# Patient Record
Sex: MALE | Race: WHITE | NOT HISPANIC OR LATINO | Employment: OTHER | URBAN - METROPOLITAN AREA
[De-identification: names, ages, dates, MRNs, and addresses within clinical notes are randomized per-mention and may not be internally consistent; named-entity substitution may affect disease eponyms.]

---

## 2017-04-21 ENCOUNTER — TELEPHONE (OUTPATIENT)
Dept: PHARMACY | Facility: OTHER | Age: 72
End: 2017-04-21

## 2017-04-21 ENCOUNTER — OFFICE VISIT (OUTPATIENT)
Dept: CARDIOLOGY | Facility: CLINIC | Age: 72
End: 2017-04-21
Payer: COMMERCIAL

## 2017-04-21 VITALS
DIASTOLIC BLOOD PRESSURE: 78 MMHG | SYSTOLIC BLOOD PRESSURE: 132 MMHG | WEIGHT: 210 LBS | HEIGHT: 71 IN | BODY MASS INDEX: 29.4 KG/M2 | HEART RATE: 60 BPM

## 2017-04-21 DIAGNOSIS — I25.10 CORONARY ARTERY DISEASE INVOLVING NATIVE CORONARY ARTERY OF NATIVE HEART WITHOUT ANGINA PECTORIS: ICD-10-CM

## 2017-04-21 DIAGNOSIS — E78.00 HYPERCHOLESTEREMIA: Primary | ICD-10-CM

## 2017-04-21 PROCEDURE — 99214 OFFICE O/P EST MOD 30 MIN: CPT | Performed by: INTERNAL MEDICINE

## 2017-04-21 NOTE — LETTER
4/21/2017    Josef Doll MD  [facility]   [address]  [facility] [address]    RE: Michael Seals       Dear Colleague,    I had the pleasure of seeing Michael Seals in the [facility].    Micahel Seals, a 71-year-old man with coronary artery disease, dyslipidemia and hypertension was seen today at your request for a followup.      Mr. Seals underwent implantation of a 3.5 x 28 mm length everolimus stent in the proximal right coronary in 2012 for management of new onset angina and a positive stress echocardiogram.  No other significant narrowings were seen in the left coronary vessels.      In 08/2015 the patient experienced atypical left scapular pain.  Diagnostic angiography showed a normal ejection fraction without regional wall motion abnormalities.  The stented right coronary was widely patent with no restenosis and no new lesion.  The left main was normal.  The LAD had a moderate 60% narrowing, improved since the patient's last angiogram.  The circumflex had a 40%-50% mid vessel narrowing.  Medical therapy was advised.      Since I saw him one year ago, the patient remains entirely free of chest, arm, neck, jaw or back discomfort.  After very aggressive trials of statin drugs including atorvastatin, pitavastatin, pravastatin, simvastatin and low-dose rosuvastatin, the patient has been intolerant of statin drugs.  Any attempts of even low-dose statin drugs cause profound muscle aches which have resulted in stopping the drug.      He has no other medical complaints at this time.       PHYSICAL EXAMINATION:   GENERAL:  Exam today demonstrates a very pleasant, cooperative and intelligent 71-year-old man.   VITAL SIGNS:  His blood pressure is 130/78, his heart rate is 60.  His height is 1.8 meters, his weight is 90 kg, BMI is 29.8.   LUNGS:  Clear to percussion and auscultation.   CARDIOVASCULAR:  Shows a normal S1 with a normal S2, no S3.  There is no  murmur, rub or click.     Outpatient Encounter Prescriptions as of 4/21/2017   Medication Sig Dispense Refill     evolocumab (REPATHA) 140 MG/ML prefilled autoinjector Inject 1 mL (140 mg) Subcutaneous every 14 days 2 mL 11     STATIN NOT PRESCRIBED, INTENTIONAL, Please choose reason not prescribed, below       atenolol (TENORMIN) 50 MG tablet Take 0.5 tablets (25 mg) by mouth daily 90 tablet 3     RABEprazole (ACIPHEX) 20 MG tablet Take 1 tablet (20 mg) by mouth daily 90 tablet 3     omeprazole-sodium bicarbonate (ZEGERID)  MG per capsule Take 1 capsule (40 mg) by mouth daily as needed 30 capsule 3     nitroglycerin (NITROSTAT) 0.4 MG SL tablet Place 1 tablet (0.4 mg) under the tongue every 5 minutes as needed for chest pain 90 tablet 3     Multiple Vitamins-Minerals (MULTIVITAMIN PO) Take by mouth daily       aspirin (SB LOW DOSE ASA EC) 81 MG EC tablet Take 81 mg by mouth daily.       [DISCONTINUED] ezetimibe (ZETIA) 10 MG tablet Take 1 tablet (10 mg) by mouth daily 90 tablet 3     [DISCONTINUED] pitavastatin (LIVALO) 4 MG TABS tablet Take 4 mg by mouth daily 90 tablet 3     [DISCONTINUED] rosuvastatin (CRESTOR) 5 MG tablet Once weekly 60 tablet 0     [DISCONTINUED] Omega-3 Fatty Acids (OMEGA-3 FISH OIL PO) Take by mouth daily       ACE NOT PRESCRIBED, INTENTIONAL, continuous prn Reported on 4/21/2017       No facility-administered encounter medications on file as of 4/21/2017.       ASSESSMENT:  Mr. Seals is on good medical therapy and is free of cardiovascular symptoms with optimal blood pressure but an elevated LDL cholesterol level.  His last LDL cholesterol was 187 off of treatment.      I believe at this point he is a candidate for a PCSK9 inhibitor.  After discussion with the patient today he agreed to consider Repatha.      RECOMMENDATIONS:   1.  Continue present medical therapy including his aspirin and beta blocker.   2.  Regular exercise program with a Mediterranean-style diet.   3.  Referral  for PCSK9 inhibitor.      We greatly appreciate the opportunity to see Mr. Michael Seals.  I will plan a followup visit in one year and our team will continue to follow his LDL cholesterol levels while on Repatha.                 We greatly appreciate your patient, Mr. Michael Seals.     Sincerely,    Del Betancourt MD     [facility]

## 2017-04-21 NOTE — MR AVS SNAPSHOT
"              After Visit Summary   2017    Michael Seals    MRN: [id number]           Patient Information     Date Of Birth          1945        Visit Information        Provider Department      2017 9:15 AM Del Betancourt MD [facility] [place]        Today's Diagnoses     Hypercholesteremia    -  1    Coronary artery disease involving native coronary artery of native heart without angina pectoris           Follow-ups after your visit        Who to contact     If you have questions or need follow up information about today's clinic visit or your schedule please contact [facility] directly at [phone].  Normal or non-critical lab and imaging results will be communicated to you by [employer], letter or phone within 4 business days after the clinic has received the results. If you do not hear from us within 7 days, please contact the clinic through [employer] or phone. If you have a critical or abnormal lab result, we will notify you by phone as soon as possible.  Submit refill requests through [employer] or call your pharmacy and they will forward the refill request to us. Please allow 3 business days for your refill to be completed.          Additional Information About Your Visit        MyChart Information     [employer] lets you send messages to your doctor, view your test results, renew your prescriptions, schedule appointments and more. To sign up, go to [email or web address] . Click on \"Log in\" on the left side of the screen, which will take you to the Welcome page. Then click on \"Sign up Now\" on the right side of the page.     You will be asked to enter the access code listed below, as well as some personal information. Please follow the directions to create your username and password.     Your access code is: [email or web address]  Expires: 2017  9:46 AM     Your access code will  in 90 days. If you need help or a new " "code, please call your [facility] clinic or [phone].        Care EveryWhere ID     This is your Care EveryWhere ID. This could be used by other organizations to access your [facility] medical records  [id number]        Your Vitals Were     Pulse Height BMI (Body Mass Index)             60 1.791 m (5' 10.5\") 29.71 kg/m2          Blood Pressure from Last 3 Encounters:   04/21/17 132/78   07/29/16 126/86   11/25/15 108/72    Weight from Last 3 Encounters:   04/21/17 95.3 kg (210 lb)   07/29/16 96.6 kg (213 lb)   11/25/15 96.2 kg (212 lb)              Today, you had the following     No orders found for display         Today's Medication Changes          These changes are accurate as of: 4/21/17  9:46 AM.  If you have any questions, ask your nurse or doctor.               Start taking these medicines.        Dose/Directions    evolocumab 140 MG/ML prefilled autoinjector   Commonly known as:  REPATHA   Used for:  Hypercholesteremia   Started by:  Del Betancourt MD        Dose:  140 mg   Inject 1 mL (140 mg) Subcutaneous every 14 days   Quantity:  2 mL   Refills:  11            Where to get your medicines      These medications were sent to [facility] - [place], [place] - [address]   [address] , [facility] [address]    Hours:  Mon-Fri 8:30am-5:00pm Toll Free [phone] Phone:  [phone]     evolocumab 140 MG/ML prefilled autoinjector                Primary Care Provider Office Phone # Fax #    Josef Doll [phone]       [facility] [address]  [facility] [address]        Thank you!     Thank you for choosing [facility] [place]  for your care. Our goal is always to provide you with excellent care. Hearing back from our patients is one way we can continue to improve our services. Please take a few minutes to complete the written survey that you may receive in the mail after your " visit with us. Thank you!             Your Updated Medication List - Protect others around you: Learn how to safely use, store and throw away your medicines at www.disposemymeds.org.          This list is accurate as of: 4/21/17  9:46 AM.  Always use your most recent med list.                   Brand Name Dispense Instructions for use    ACE NOT PRESCRIBED (INTENTIONAL)      continuous prn Reported on 4/21/2017       atenolol 50 MG tablet    TENORMIN    90 tablet    Take 0.5 tablets (25 mg) by mouth daily       evolocumab 140 MG/ML prefilled autoinjector    REPATHA    2 mL    Inject 1 mL (140 mg) Subcutaneous every 14 days       MULTIVITAMIN PO      Take by mouth daily       nitroglycerin 0.4 MG sublingual tablet    NITROSTAT    90 tablet    Place 1 tablet (0.4 mg) under the tongue every 5 minutes as needed for chest pain       omeprazole-sodium bicarbonate  MG per capsule    ZEGERID    30 capsule    Take 1 capsule (40 mg) by mouth daily as needed       RABEprazole 20 MG EC tablet    ACIPHEX    90 tablet    Take 1 tablet (20 mg) by mouth daily       SB LOW DOSE ASA EC 81 MG EC tablet   Generic drug:  aspirin      Take 81 mg by mouth daily.       STATIN NOT PRESCRIBED (INTENTIONAL)      Please choose reason not prescribed, below

## 2017-04-21 NOTE — TELEPHONE ENCOUNTER
PA Initiation    Medication: Repatha  Insurance Company: [employer] - Phone [phone] Fax [phone]  Pharmacy Filling the Rx: [facility] - [place], [place] - [facility] [address]  Filling Pharmacy Phone: [phone]  Filling Pharmacy Fax: [phone]  Start Date: 4/21/2017

## 2017-04-21 NOTE — PROGRESS NOTES
HPI and Plan:   See dictation    No orders of the defined types were placed in this encounter.      Orders Placed This Encounter   Medications     evolocumab (REPATHA) 140 MG/ML prefilled autoinjector     Sig: Inject 1 mL (140 mg) Subcutaneous every 14 days     Dispense:  2 mL     Refill:  11       Medications Discontinued During This Encounter   Medication Reason     pitavastatin (LIVALO) 4 MG TABS tablet Stopped by Patient     ezetimibe (ZETIA) 10 MG tablet Stopped by Patient     Omega-3 Fatty Acids (OMEGA-3 FISH OIL PO) Stopped by Patient     rosuvastatin (CRESTOR) 5 MG tablet Stopped by Patient         Encounter Diagnoses   Name Primary?     Hypercholesteremia Yes     Coronary artery disease involving native coronary artery of native heart without angina pectoris        CURRENT MEDICATIONS:  Current Outpatient Prescriptions   Medication Sig Dispense Refill     evolocumab (REPATHA) 140 MG/ML prefilled autoinjector Inject 1 mL (140 mg) Subcutaneous every 14 days 2 mL 11     atenolol (TENORMIN) 50 MG tablet Take 0.5 tablets (25 mg) by mouth daily 90 tablet 3     RABEprazole (ACIPHEX) 20 MG tablet Take 1 tablet (20 mg) by mouth daily 90 tablet 3     omeprazole-sodium bicarbonate (ZEGERID)  MG per capsule Take 1 capsule (40 mg) by mouth daily as needed 30 capsule 3     nitroglycerin (NITROSTAT) 0.4 MG SL tablet Place 1 tablet (0.4 mg) under the tongue every 5 minutes as needed for chest pain 90 tablet 3     Multiple Vitamins-Minerals (MULTIVITAMIN PO) Take by mouth daily       aspirin (SB LOW DOSE ASA EC) 81 MG EC tablet Take 81 mg by mouth daily.       ACE NOT PRESCRIBED, INTENTIONAL, continuous prn Reported on 4/21/2017         ALLERGIES     Allergies   Allergen Reactions     Atorvastatin      Crestor [Rosuvastatin]      Muscle aches     Livalo [Pitavastatin]      Muscle aches     Pravastatin      Simvastatin        PAST MEDICAL HISTORY:  Past Medical History:   Diagnosis Date     CAD (coronary artery  disease)     3/2012 LILLIAN RCA, 2015 Heart cath - previously placed RCA stent patent, 70% mid LAD stenosis, 50% prox CFX, EF 55-60%     CKD (chronic kidney disease) stage 2, GFR 60-89 ml/min     baseline crt 1.20 - 1.60     Esophageal dysmotility      GERD (gastroesophageal reflux disease)      H pylori ulcer      Hip fracture (H) 2013    right side - closed fracture - no surgery     HTN (hypertension)      Hyperlipidaemia LDL goal <100      Moderate major depression (H)      S/P appendectomy      S/P hernia repair     left     S/P PTCA (percutaneous transluminal coronary angioplasty) 2012    LILLIAN RCA       PAST SURGICAL HISTORY:  Past Surgical History:   Procedure Laterality Date     APPENDECTOMY       CORONARY ANGIOGRAPHY ADULT ORDER  2015    previously placed RCA stent patent, 70% mid LAD stenosis, 50% prox CFX, EF 55-60%     HEART CATH STENT COR W/WO PTCA  3/2012    LILLIAN to RCA      HERNIA REPAIR         FAMILY HISTORY:  Family History   Problem Relation Age of Onset     CEREBROVASCULAR DISEASE Mother      CEREBROVASCULAR DISEASE Father      C.A.D. Father      Myocardial Infarction Father      Parkinsonism Brother      Heart Failure Brother      Other - See Comments Brother       in [place] war       SOCIAL HISTORY:  Social History     Social History     Marital status:      Spouse name: N/A     Number of children: N/A     Years of education: N/A     Social History Main Topics     Smoking status: Former Smoker     Packs/day: 1.00     Years: 8.00     Types: Cigarettes     Smokeless tobacco: Never Used      Comment: quit 1970     Alcohol use 6.0 oz/week     10 Standard drinks or equivalent per week      Comment: 1-2 glasses of wine per evening with dinner     Drug use: No     Sexual activity: Yes     Partners: Female     Other Topics Concern     Caffeine Concern Yes     3-4 cups coffee per day     Sleep Concern Yes     off and on     Weight Concern Yes     Special Diet No     Exercise Yes      "walking 5 days week, 2.5 miles, biking occasionally     Social History Narrative       Review of Systems:  Skin:  Negative       Eyes:  Positive for glasses;glaucoma    ENT:  Positive for hearing loss wears hearing aids  Respiratory:  Negative       Cardiovascular:    Positive for;lightheadedness when getting up too quickly in AM  Gastroenterology: Positive for reflux once in a while - food and stress related - per pt  Genitourinary:  Negative      Musculoskeletal:  Positive for arthritis left hand and elbow  Neurologic:  Negative      Psychiatric:  Negative      Heme/Lymph/Imm:  Positive for allergies Statins  Endocrine:  Negative        Physical Exam:  Vitals: /78 (BP Location: Right arm, Patient Position: Chair, Cuff Size: Adult Large)  Pulse 60  Ht 1.791 m (5' 10.5\")  Wt 95.3 kg (210 lb)  BMI 29.71 kg/m2    Constitutional:  cooperative, alert and oriented, well developed, well nourished, in no acute distress        Skin:  warm and dry to the touch, no apparent skin lesions or masses noted        Head:  normocephalic, no masses or lesions        Eyes:  pupils equal and round, conjunctivae and lids unremarkable, sclera white, no xanthalasma, EOMS intact, no nystagmus        ENT:  no pallor or cyanosis, dentition good        Neck:  carotid pulses are full and equal bilaterally, JVP normal, no carotid bruit, no thyromegaly        Chest:  normal breath sounds, clear to auscultation, normal A-P diameter, normal symmetry, normal respiratory excursion, no use of accessory muscles          Cardiac: regular rhythm, normal S1/S2, no S3 or S4, apical impulse not displaced, no murmurs, gallops or rubs                  Abdomen:  abdomen soft, non-tender, BS normoactive, no mass, no HSM, no bruits        Vascular: pulses full and equal, no bruits auscultated                                        Extremities and Back:  no deformities, clubbing, cyanosis, erythema observed              Neurological:  affect " appropriate, oriented to time, person and place              CC  No referring provider defined for this encounter.

## 2017-04-22 NOTE — PROGRESS NOTES
HISTORY OF PRESENT ILLNESS:  Michael Seals, a 71-year-old man with coronary artery disease, dyslipidemia and hypertension was seen today at your request for a followup.      Mr. Seals underwent implantation of a 3.5 x 28 mm length everolimus stent in the proximal right coronary in 2012 for management of new onset angina and a positive stress echocardiogram.  No other significant narrowings were seen in the left coronary vessels.      In 08/2015 the patient experienced atypical left scapular pain.  Diagnostic angiography showed a normal ejection fraction without regional wall motion abnormalities.  The stented right coronary was widely patent with no restenosis and no new lesion.  The left main was normal.  The LAD had a moderate 60% narrowing, improved since the patient's last angiogram.  The circumflex had a 40%-50% mid vessel narrowing.  Medical therapy was advised.      Since I saw him one year ago, the patient remains entirely free of chest, arm, neck, jaw or back discomfort.  After very aggressive trials of statin drugs including atorvastatin, pitavastatin, pravastatin, simvastatin and low-dose rosuvastatin, the patient has been intolerant of statin drugs.  Any attempts of even low-dose statin drugs cause profound muscle aches which have resulted in stopping the drug.      He has no other medical complaints at this time.       PHYSICAL EXAMINATION:   GENERAL:  Exam today demonstrates a very pleasant, cooperative and intelligent 71-year-old man.   VITAL SIGNS:  His blood pressure is 130/78, his heart rate is 60.  His height is 1.8 meters, his weight is 90 kg, BMI is 29.8.   LUNGS:  Clear to percussion and auscultation.   CARDIOVASCULAR:  Shows a normal S1 with a normal S2, no S3.  There is no murmur, rub or click.      ASSESSMENT:  Mr. Seals is on good medical therapy and is free of cardiovascular symptoms with optimal blood pressure but an elevated LDL cholesterol level.  His last LDL cholesterol was 187 off of  treatment.      I believe at this point he is a candidate for a PCSK9 inhibitor.  After discussion with the patient today he agreed to consider Repatha.      RECOMMENDATIONS:   1.  Continue present medical therapy including his aspirin and beta blocker.   2.  Regular exercise program with a Mediterranean-style diet.   3.  Referral for PCSK9 inhibitor.      We greatly appreciate the opportunity to see Mr. Niurka Seals.  I will plan a followup visit in one year and our team will continue to follow his LDL cholesterol levels while on Repatha.       We greatly appreciate your patient, Mr. Niurka Seals.      cc:   Josef Doll MD    [facility]   [address]   [place]  [address]         NARINDER SEALS MD             D: 2017 09:53   T: 2017 20:50   MT: KARIN      Name:     NIURKA SEALS   MRN:      [id number]        Account:      [id number]   :      1945           Service Date: 2017      Document: [id number]

## 2017-04-26 NOTE — TELEPHONE ENCOUNTER
PRIOR AUTHORIZATION DENIED    Medication: Repatha    Denial Date: 4/26/2017    Denial Rational:     Appeal Information:  [facility] SPECIALTY APPEALS DEPARTMENT:  [address] [place] [address]  FAX# [phone]

## 2017-07-27 ENCOUNTER — TELEPHONE (OUTPATIENT)
Dept: FAMILY MEDICINE | Facility: CLINIC | Age: 72
End: 2017-07-27

## 2017-08-04 DIAGNOSIS — K21.00 GASTROESOPHAGEAL REFLUX DISEASE WITH ESOPHAGITIS: ICD-10-CM

## 2017-08-04 NOTE — TELEPHONE ENCOUNTER
TC: please call patient and schedule. Over due for physical with labs. Then send to Dr Doll to sign.  Mail order pharmacy, 90 days pended.    Pending Prescriptions:                       Disp   Refills    RABEprazole (ACIPHEX) 20 MG EC tablet     90 tab*0            Sig: Take 1 tablet (20 mg) by mouth daily - Physical           with labs needed for additional refills          Last Written Prescription Date: 7-  Last Fill Quantity: 90,  # refills: 3   Last Office Visit with [facility] or [facility] prescribing provider: 7- RT Rimma (R)

## 2017-08-07 RX ORDER — RABEPRAZOLE SODIUM 20 MG/1
20 TABLET, DELAYED RELEASE ORAL DAILY
Qty: 90 TABLET | Refills: 0 | Status: SHIPPED | OUTPATIENT
Start: 2017-08-07 | End: 2017-08-18

## 2017-08-18 ENCOUNTER — OFFICE VISIT (OUTPATIENT)
Dept: FAMILY MEDICINE | Facility: CLINIC | Age: 72
End: 2017-08-18
Payer: COMMERCIAL

## 2017-08-18 VITALS
DIASTOLIC BLOOD PRESSURE: 80 MMHG | HEIGHT: 71 IN | OXYGEN SATURATION: 97 % | WEIGHT: 206 LBS | BODY MASS INDEX: 28.84 KG/M2 | HEART RATE: 49 BPM | SYSTOLIC BLOOD PRESSURE: 134 MMHG

## 2017-08-18 DIAGNOSIS — K21.00 GASTROESOPHAGEAL REFLUX DISEASE WITH ESOPHAGITIS: ICD-10-CM

## 2017-08-18 DIAGNOSIS — E78.00 HYPERCHOLESTEREMIA: ICD-10-CM

## 2017-08-18 DIAGNOSIS — Z00.00 ROUTINE GENERAL MEDICAL EXAMINATION AT A HEALTH CARE FACILITY: Primary | ICD-10-CM

## 2017-08-18 DIAGNOSIS — R94.39 ABNORMAL STRESS TEST: ICD-10-CM

## 2017-08-18 DIAGNOSIS — Z98.61 S/P PTCA (PERCUTANEOUS TRANSLUMINAL CORONARY ANGIOPLASTY): ICD-10-CM

## 2017-08-18 DIAGNOSIS — K21.9 GASTROESOPHAGEAL REFLUX DISEASE WITHOUT ESOPHAGITIS: ICD-10-CM

## 2017-08-18 DIAGNOSIS — M65.30 TRIGGER FINGER, ACQUIRED: ICD-10-CM

## 2017-08-18 DIAGNOSIS — I25.10 ASCVD (ARTERIOSCLEROTIC CARDIOVASCULAR DISEASE): ICD-10-CM

## 2017-08-18 DIAGNOSIS — E78.5 HYPERLIPIDEMIA WITH TARGET LDL LESS THAN 70: ICD-10-CM

## 2017-08-18 DIAGNOSIS — I10 ESSENTIAL HYPERTENSION: ICD-10-CM

## 2017-08-18 DIAGNOSIS — N18.2 CKD (CHRONIC KIDNEY DISEASE) STAGE 2, GFR 60-89 ML/MIN: ICD-10-CM

## 2017-08-18 LAB
ALBUMIN SERPL-MCNC: 3.5 G/DL (ref 3.4–5)
ALP SERPL-CCNC: 84 U/L (ref 40–150)
ALT SERPL W P-5'-P-CCNC: 36 U/L (ref 0–70)
ANION GAP SERPL CALCULATED.3IONS-SCNC: 5 MMOL/L (ref 3–14)
AST SERPL W P-5'-P-CCNC: 23 U/L (ref 0–45)
BILIRUB SERPL-MCNC: 0.4 MG/DL (ref 0.2–1.3)
BUN SERPL-MCNC: 20 MG/DL (ref 7–30)
CALCIUM SERPL-MCNC: 9.1 MG/DL (ref 8.5–10.1)
CHLORIDE SERPL-SCNC: 106 MMOL/L (ref 94–109)
CHOLEST SERPL-MCNC: 341 MG/DL
CO2 SERPL-SCNC: 28 MMOL/L (ref 20–32)
CREAT SERPL-MCNC: 1.35 MG/DL (ref 0.66–1.25)
ERYTHROCYTE [DISTWIDTH] IN BLOOD BY AUTOMATED COUNT: 12.5 % (ref 10–15)
GFR SERPL CREATININE-BSD FRML MDRD: 52 ML/MIN/1.7M2
GLUCOSE SERPL-MCNC: 109 MG/DL (ref 70–99)
HBA1C MFR BLD: 5.2 % (ref 4.3–6)
HCT VFR BLD AUTO: 42.8 % (ref 40–53)
HDLC SERPL-MCNC: 53 MG/DL
HGB BLD-MCNC: 14.7 G/DL (ref 13.3–17.7)
LDLC SERPL CALC-MCNC: 232 MG/DL
MCH RBC QN AUTO: 32.9 PG (ref 26.5–33)
MCHC RBC AUTO-ENTMCNC: 34.3 G/DL (ref 31.5–36.5)
MCV RBC AUTO: 96 FL (ref 78–100)
NONHDLC SERPL-MCNC: 288 MG/DL
PLATELET # BLD AUTO: 223 10E9/L (ref 150–450)
POTASSIUM SERPL-SCNC: 4.8 MMOL/L (ref 3.4–5.3)
PROT SERPL-MCNC: 7.5 G/DL (ref 6.8–8.8)
PSA SERPL-ACNC: 1.29 UG/L (ref 0–4)
RBC # BLD AUTO: 4.47 10E12/L (ref 4.4–5.9)
SODIUM SERPL-SCNC: 139 MMOL/L (ref 133–144)
TRIGL SERPL-MCNC: 281 MG/DL
WBC # BLD AUTO: 5.8 10E9/L (ref 4–11)

## 2017-08-18 PROCEDURE — G0103 PSA SCREENING: HCPCS | Performed by: INTERNAL MEDICINE

## 2017-08-18 PROCEDURE — 36415 COLL VENOUS BLD VENIPUNCTURE: CPT | Performed by: INTERNAL MEDICINE

## 2017-08-18 PROCEDURE — 85027 COMPLETE CBC AUTOMATED: CPT | Performed by: INTERNAL MEDICINE

## 2017-08-18 PROCEDURE — 80061 LIPID PANEL: CPT | Performed by: INTERNAL MEDICINE

## 2017-08-18 PROCEDURE — 83036 HEMOGLOBIN GLYCOSYLATED A1C: CPT | Performed by: INTERNAL MEDICINE

## 2017-08-18 PROCEDURE — 80053 COMPREHEN METABOLIC PANEL: CPT | Performed by: INTERNAL MEDICINE

## 2017-08-18 PROCEDURE — G0439 PPPS, SUBSEQ VISIT: HCPCS | Performed by: INTERNAL MEDICINE

## 2017-08-18 RX ORDER — ATENOLOL 50 MG/1
25 TABLET ORAL DAILY
Qty: 90 TABLET | Refills: 3 | Status: SHIPPED | OUTPATIENT
Start: 2017-08-18 | End: 2018-02-28

## 2017-08-18 RX ORDER — RABEPRAZOLE SODIUM 20 MG/1
20 TABLET, DELAYED RELEASE ORAL DAILY
Qty: 90 TABLET | Refills: 3 | Status: SHIPPED | OUTPATIENT
Start: 2017-08-18 | End: 2018-02-07

## 2017-08-18 NOTE — MR AVS SNAPSHOT
After Visit Summary   8/18/2017    Michael Seals    MRN: [id number]           Patient Information     Date Of Birth          1945        Visit Information        Provider Department      8/18/2017 9:30 AM Josef Doll MD [facility]        Today's Diagnoses     Routine general medical examination at a health care facility    -  1    S/P PTCA (percutaneous transluminal coronary angioplasty)        CKD (chronic kidney disease) stage 2, GFR 60-89 ml/min        Hyperlipidemia with target LDL less than 70        Essential hypertension        Abnormal stress test        Gastroesophageal reflux disease without esophagitis        Hypercholesteremia        Gastroesophageal reflux disease with esophagitis        ASCVD (arteriosclerotic cardiovascular disease)          Care Instructions      Preventive Health Recommendations:       Male Ages 65 and over    Yearly exam:             See your health care provider every year in order to  o   Review health changes.   o   Discuss preventive care.    o   Review your medicines if your doctor has prescribed any.    Talk with your health care provider about whether you should have a test to screen for prostate cancer (PSA).    Every 3 years, have a diabetes test (fasting glucose). If you are at risk for diabetes, you should have this test more often.    Every 5 years, have a cholesterol test. Have this test more often if you are at risk for high cholesterol or heart disease.     Every 10 years, have a colonoscopy. Or, have a yearly FIT test (stool test). These exams will check for colon cancer.    Talk to with your health care provider about screening for Abdominal Aortic Aneurysm if you have a family history of AAA or have a history of smoking.  Shots:     Get a flu shot each year.     Get a tetanus shot every 10 years.     Talk to your doctor about your pneumonia vaccines. There are now two you should receive - Pneumovax (PPSV 23) and Prevnar  (PCV 13).    Talk to your doctor about a shingles vaccine.     Talk to your doctor about the hepatitis B vaccine.  Nutrition:     Eat at least 5 servings of fruits and vegetables each day.     Eat whole-grain bread, whole-wheat pasta and brown rice instead of white grains and rice.     Talk to your doctor about Calcium and Vitamin D.   Lifestyle    Exercise for at least 150 minutes a week (30 minutes a day, 5 days a week). This will help you control your weight and prevent disease.     Limit alcohol to one drink per day.     No smoking.     Wear sunscreen to prevent skin cancer.     See your dentist every six months for an exam and cleaning.     See your eye doctor every 1 to 2 years to screen for conditions such as glaucoma, macular degeneration and cataracts.    (Z00.00) Routine general medical examination at a health care facility  (primary encounter diagnosis)  Comment: For routine exam, we will draw labs as ordered, cholesterol, diabetes mellitus check, liver function, renal function, PSA and request FIT tset - A negative FIT test indicates no evidence of colon cancer, but it should be repeated every year to have comparable sensitivity to that of a colonoscopy.   We will also update vaccination history  Plan: Fecal colorectal cancer screen (FIT),         Hemoglobin A1c, CBC with platelets, Lipid panel        reflex to direct LDL, Comprehensive metabolic         panel, Prostate spec antigen screen            (Z98.61) S/P PTCA (percutaneous transluminal coronary angioplasty)  Comment: Recommend starting Repatha as you have not been able to tolerate any statins.  Also, continue aspirin, ad blood pressure management   Plan:     (N18.2) CKD (chronic kidney disease) stage 2, GFR 60-89 ml/min  Comment: check renal function today  Plan:     (E78.5) Hyperlipidemia with target LDL less than 70  Comment: As above - Repatha ordered  Plan:     (I10) Essential hypertension  Comment: blood pressure is well controlled   Plan:  "    (K21.9) Gastroesophageal reflux disease without esophagitis  Comment: Aciphex ordered today  Plan:     (E78.00) Hypercholesteremia  Comment: as above   Plan: evolocumab (REPATHA) 140 MG/ML prefilled         autoinjector                      Follow-ups after your visit        Future tests that were ordered for you today     Open Future Orders        Priority Expected Expires Ordered    Fecal colorectal cancer screen (FIT) Routine 2017 11/10/2017 2017            Who to contact     If you have questions or need follow up information about today's clinic visit or your schedule please contact [facility] directly at [phone].  Normal or non-critical lab and imaging results will be communicated to you by [employer], letter or phone within 4 business days after the clinic has received the results. If you do not hear from us within 7 days, please contact the clinic through [employer] or phone. If you have a critical or abnormal lab result, we will notify you by phone as soon as possible.  Submit refill requests through [employer] or call your pharmacy and they will forward the refill request to us. Please allow 3 business days for your refill to be completed.          Additional Information About Your Visit        [employer] Information     [employer] lets you send messages to your doctor, view your test results, renew your prescriptions, schedule appointments and more. To sign up, go to [email or web address] . Click on \"Log in\" on the left side of the screen, which will take you to the Welcome page. Then click on \"Sign up Now\" on the right side of the page.     You will be asked to enter the access code listed below, as well as some personal information. Please follow the directions to create your username and password.     Your access code is: [email or web address]  Expires: 2017  9:56 AM     Your access code will  in 90 days. If you need help or a new code, please call your [facility] or " "[phone].        Care EveryWhere ID     This is your Care EveryWhere ID. This could be used by other organizations to access your [place] medical records  [id number]        Your Vitals Were     Pulse Height Pulse Oximetry BMI (Body Mass Index)          53 5' 10.5\" (1.791 m) 97% 29.14 kg/m2         Blood Pressure from Last 3 Encounters:   08/18/17 141/89   04/21/17 132/78   07/29/16 126/86    Weight from Last 3 Encounters:   08/18/17 206 lb (93.4 kg)   04/21/17 210 lb (95.3 kg)   07/29/16 213 lb (96.6 kg)              We Performed the Following     CBC with platelets     Comprehensive metabolic panel     Hemoglobin A1c     Lipid panel reflex to direct LDL     Prostate spec antigen screen          Where to get your medicines      These medications were sent to [facility] - [place], [place] - [address] to [address], [facility] [address]     Phone:  [phone]     atenolol 50 MG tablet    evolocumab 140 MG/ML prefilled autoinjector    RABEprazole 20 MG EC tablet          Primary Care Provider Office Phone # Fax #    Josef Doll [phone] [address] [address]        Equal Access to Services     ABUNDIO BRANDON AH: Hadii jarrod ku hadasho Sokemal, waaxda luqadaha, qaybta kaalmada arden luque . So [facility] [phone].    ATENCIÓN: Si habla español, tiene a banda disposición servicios gratuitos de asistencia lingüística. Darlin al [phone].    We comply with applicable federal civil rights laws and [place] laws. We do not discriminate on the basis of race, color, national origin, age, disability sex, sexual orientation or gender identity.            Thank you!     Thank you for choosing [facility]  for your care. Our goal is always to provide you with excellent care. Hearing back from our patients is one way we can continue to improve our " services. Please take a few minutes to complete the written survey that you may receive in the mail after your visit with us. Thank you!             Your Updated Medication List - Protect others around you: Learn how to safely use, store and throw away your medicines at www.disposemymeds.org.          This list is accurate as of: 8/18/17  9:56 AM.  Always use your most recent med list.                   Brand Name Dispense Instructions for use Diagnosis    ACE NOT PRESCRIBED (INTENTIONAL)      continuous prn Reported on 4/21/2017        atenolol 50 MG tablet    TENORMIN    90 tablet    Take 0.5 tablets (25 mg) by mouth daily    ASCVD (arteriosclerotic cardiovascular disease)       evolocumab 140 MG/ML prefilled autoinjector    REPATHA    2 mL    Inject 1 mL (140 mg) Subcutaneous every 14 days    Hypercholesteremia       MULTIVITAMIN PO      Take by mouth daily        nitroGLYcerin 0.4 MG sublingual tablet    NITROSTAT    90 tablet    Place 1 tablet (0.4 mg) under the tongue every 5 minutes as needed for chest pain    ASCVD (arteriosclerotic cardiovascular disease)       omeprazole-sodium bicarbonate  MG per capsule    ZEGERID    30 capsule    Take 1 capsule (40 mg) by mouth daily as needed    Esophageal dysmotility       RABEprazole 20 MG EC tablet    ACIPHEX    90 tablet    Take 1 tablet (20 mg) by mouth daily - Physical with labs needed for additional refills    Gastroesophageal reflux disease with esophagitis       SB LOW DOSE ASA EC 81 MG EC tablet   Generic drug:  aspirin      Take 81 mg by mouth daily.        STATIN NOT PRESCRIBED (INTENTIONAL)      Please choose reason not prescribed, below    Hypercholesteremia, Coronary artery disease involving native coronary artery of native heart without angina pectoris

## 2017-08-18 NOTE — PATIENT INSTRUCTIONS
Preventive Health Recommendations:       Male Ages 65 and over    Yearly exam:             See your health care provider every year in order to  o   Review health changes.   o   Discuss preventive care.    o   Review your medicines if your doctor has prescribed any.    Talk with your health care provider about whether you should have a test to screen for prostate cancer (PSA).    Every 3 years, have a diabetes test (fasting glucose). If you are at risk for diabetes, you should have this test more often.    Every 5 years, have a cholesterol test. Have this test more often if you are at risk for high cholesterol or heart disease.     Every 10 years, have a colonoscopy. Or, have a yearly FIT test (stool test). These exams will check for colon cancer.    Talk to with your health care provider about screening for Abdominal Aortic Aneurysm if you have a family history of AAA or have a history of smoking.  Shots:     Get a flu shot each year.     Get a tetanus shot every 10 years.     Talk to your doctor about your pneumonia vaccines. There are now two you should receive - Pneumovax (PPSV 23) and Prevnar (PCV 13).    Talk to your doctor about a shingles vaccine.     Talk to your doctor about the hepatitis B vaccine.  Nutrition:     Eat at least 5 servings of fruits and vegetables each day.     Eat whole-grain bread, whole-wheat pasta and brown rice instead of white grains and rice.     Talk to your doctor about Calcium and Vitamin D.   Lifestyle    Exercise for at least 150 minutes a week (30 minutes a day, 5 days a week). This will help you control your weight and prevent disease.     Limit alcohol to one drink per day.     No smoking.     Wear sunscreen to prevent skin cancer.     See your dentist every six months for an exam and cleaning.     See your eye doctor every 1 to 2 years to screen for conditions such as glaucoma, macular degeneration and cataracts.    (Z00.00) Routine general medical examination at a health  care facility  (primary encounter diagnosis)  Comment: For routine exam, we will draw labs as ordered, cholesterol, diabetes mellitus check, liver function, renal function, PSA and request FIT tset - A negative FIT test indicates no evidence of colon cancer, but it should be repeated every year to have comparable sensitivity to that of a colonoscopy.   We will also update vaccination history  Plan: Fecal colorectal cancer screen (FIT),         Hemoglobin A1c, CBC with platelets, Lipid panel        reflex to direct LDL, Comprehensive metabolic         panel, Prostate spec antigen screen            (Z98.61) S/P PTCA (percutaneous transluminal coronary angioplasty)  Comment: Recommend starting Repatha as you have not been able to tolerate any statins.  Also, continue aspirin, ad blood pressure management   Plan:     (N18.2) CKD (chronic kidney disease) stage 2, GFR 60-89 ml/min  Comment: check renal function today  Plan:     (E78.5) Hyperlipidemia with target LDL less than 70  Comment: As above - Repatha ordered  Plan:     (I10) Essential hypertension  Comment: blood pressure is well controlled   Plan:     (K21.9) Gastroesophageal reflux disease without esophagitis  Comment: Aciphex ordered today  Plan:     (E78.00) Hypercholesteremia  Comment: as above   Plan: evolocumab (REPATHA) 140 MG/ML prefilled         autoinjector

## 2017-08-18 NOTE — NURSING NOTE
"Chief Complaint   Patient presents with     Wellness Visit       Initial /89  Pulse 53  Ht 5' 10.5\" (1.791 m)  Wt 206 lb (93.4 kg)  SpO2 97%  BMI 29.14 kg/m2 Estimated body mass index is 29.14 kg/(m^2) as calculated from the following:    Height as of this encounter: 5' 10.5\" (1.791 m).    Weight as of this encounter: 206 lb (93.4 kg).  Medication Reconciliation: complete     Margaret Santana      "

## 2017-08-18 NOTE — LETTER
[address]  [address]  [place]  [address]  Tel: [phone]    August 21, 2017    Michael Seals  [address]  [place] [address]        Dear Mr. Seals,    I had the opportunity to review your recent labs and a summary of your labs reads as follows:     Your complete blood counts show no sign of anemia, normal white blood cell count and platelets.   Your comprehensive metabolic panel showed stable and impaired renal function, normal liver function, and stable but slightly elevated fasting blood glucose indicating no evidence of diabetes mellitus.   Your hemoglobin A1c also shows stable average blood glucose   Your fasting lipid panel show   - normal HDL (good) cholesterol -as your goal is greater than 40   - low LDL (bad) cholesterol as your goal is less than 100 - as we discussed, we will try to obtain new medication for management of your cholesterol and prevention of heart disease and stroke - Repatha   - elevated triglyceride levels   Your PSA level is also stable indicating no evidence of prostate cancer     As always, it was great to see you again     Sincerely,    Josef Doll MD/NB      Results for orders placed or performed in visit on 08/18/17   Hemoglobin A1c   Result Value Ref Range    Hemoglobin A1C 5.2 4.3 - 6.0 %   CBC with platelets   Result Value Ref Range    WBC 5.8 4.0 - 11.0 10e9/L    RBC Count 4.47 4.4 - 5.9 10e12/L    Hemoglobin 14.7 13.3 - 17.7 g/dL    Hematocrit 42.8 40.0 - 53.0 %    MCV 96 78 - 100 fl    MCH 32.9 26.5 - 33.0 pg    MCHC 34.3 31.5 - 36.5 g/dL    RDW 12.5 10.0 - 15.0 %    Platelet Count 223 150 - 450 10e9/L   Lipid panel reflex to direct LDL   Result Value Ref Range    Cholesterol 341 (H) <200 mg/dL    Triglycerides 281 (H) <150 mg/dL    HDL Cholesterol 53 >39 mg/dL    LDL Cholesterol Calculated 232 (H) <100 mg/dL    Non HDL Cholesterol 288 (H) <130 mg/dL   Comprehensive metabolic panel   Result Value Ref Range    Sodium 139 133 - 144  mmol/L    Potassium 4.8 3.4 - 5.3 mmol/L    Chloride 106 94 - 109 mmol/L    Carbon Dioxide 28 20 - 32 mmol/L    Anion Gap 5 3 - 14 mmol/L    Glucose 109 (H) 70 - 99 mg/dL    Urea Nitrogen 20 7 - 30 mg/dL    Creatinine 1.35 (H) 0.66 - 1.25 mg/dL    GFR Estimate 52 (L) >60 mL/min/1.7m2    GFR Estimate If Black 63 >60 mL/min/1.7m2    Calcium 9.1 8.5 - 10.1 mg/dL    Bilirubin Total 0.4 0.2 - 1.3 mg/dL    Albumin 3.5 3.4 - 5.0 g/dL    Protein Total 7.5 6.8 - 8.8 g/dL    Alkaline Phosphatase 84 40 - 150 U/L    ALT 36 0 - 70 U/L    AST 23 0 - 45 U/L   Prostate spec antigen screen   Result Value Ref Range    PSA 1.29 0 - 4 ug/L

## 2017-08-18 NOTE — PROGRESS NOTES
SUBJECTIVE:   Michael Seals is a 72 year old male who presents for Preventive Visit.      Are you in the first 12 months of your Medicare Part B coverage?  No    Healthy Habits:    Do you get at least three servings of calcium containing foods daily (dairy, green leafy vegetables, etc.)? yes    Amount of exercise or daily activities, outside of work: 5 day(s) per week, 1 hour     Problems taking medications regularly No    Medication side effects: No    Have you had an eye exam in the past two years? yes    Do you see a dentist twice per year? yes    Do you have sleep apnea, excessive snoring or daytime drowsiness?no    COGNITIVE SCREEN  1) Repeat 3 items (Banana, Sunrise, Chair)    2) Clock draw: NORMAL  3) 3 item recall:  Recalls 3 objects  Results: 3 items recalled: COGNITIVE IMPAIRMENT LESS LIKELY    Mini-CogTM Copyright S Reese. Licensed by the author for use in [facility]; reprinted with permission (mily@[email or web address]). All rights reserved.                Just the left hand- it is hard to make a fit some days, there is pain right where the fingers meet the hand and the fingers lock up.     Reviewed and updated as needed this visit by clinical staffTobacco  Allergies  Soc Hx        Reviewed and updated as needed this visit by Provider        Social History   Substance Use Topics     Smoking status: Former Smoker     Packs/day: 1.00     Years: 8.00     Types: Cigarettes     Smokeless tobacco: Never Used      Comment: quit 1970     Alcohol use 6.0 oz/week     10 Standard drinks or equivalent per week      Comment: 1-2 glasses of wine per evening with dinner       The patient does not drink >3 drinks per day nor >7 drinks per week.    Today's PHQ-2 Score:   PHQ-2 ( 1999 Pfizer) 7/29/2016 3/3/2015   Q1: Little interest or pleasure in doing things 0 0   Q2: Feeling down, depressed or hopeless 0 0   PHQ-2 Score 0 0         Do you feel safe in your environment - Yes    Do you have a Health Care Directive?:  "No: Advance care planning was reviewed with patient; patient declined at this time.      Current providers sharing in care for this patient include: Patient Care Team:  Josef Doll MD as PCP - General (Internal Medicine)      Hearing impairment: No    Ability to successfully perform activities of daily living: Yes, no assistance needed     Fall risk:    Home safety:  none identified      The following health maintenance items are reviewed in Epic and correct as of today:  Health Maintenance   Topic Date Due     MICROALBUMIN Q1 YEAR  08/09/1946     ADVANCE DIRECTIVE PLANNING Q5 YRS  08/09/1998     DEPRESSION ACTION PLAN Q1 YR  01/24/2015     PHQ-9 Q6 MONTHS  01/29/2017     BMP Q1 YR  07/29/2017     LIPID MONITORING Q1 YEAR  07/29/2017     FALL RISK ASSESSMENT  07/29/2017     FIT Q1 YR  07/29/2017     INFLUENZA VACCINE (SYSTEM ASSIGNED)  09/01/2017     TETANUS IMMUNIZATION (SYSTEM ASSIGNED)  07/01/2020     PNEUMOCOCCAL  Completed     AORTIC ANEURYSM SCREENING (SYSTEM ASSIGNED)  Completed        S/P PTCA (percutaneous transluminal coronary angioplasty)   No recent chest pain, has been active going to the gym often  CKD (chronic kidney disease) stage 2, GFR 60-89 ml/min   Renal function has been stable.  Hyperlipidemia with target LDL less than 70   Continues to avoid statin due to side effects.  He was recommended to try repatha.   Essential hypertension   Blood pressure is a bit elevated today  Gastroesophageal reflux disease without esophagitis    Symptoms of gastroesophageal reflux have been very well controlled with adjusting head of the bed and not eating too late in the evening.    ROS:  Constitutional, HEENT, cardiovascular, pulmonary, GI, , musculoskeletal, neuro, skin, endocrine and psych systems are negative, except as otherwise noted.      OBJECTIVE:   /89  Pulse 53  Ht 5' 10.5\" (1.791 m)  Wt 206 lb (93.4 kg)  SpO2 97%  BMI 29.14 kg/m2 Estimated body mass index is 29.14 kg/(m^2) as " "calculated from the following:    Height as of this encounter: 5' 10.5\" (1.791 m).    Weight as of this encounter: 206 lb (93.4 kg).  EXAM:   GENERAL: healthy, alert and no distress  EYES: Eyes grossly normal to inspection, PERRL and conjunctivae and sclerae normal  HENT: ear canals and TM's normal, nose and mouth without ulcers or lesions  NECK: no adenopathy, no asymmetry, masses, or scars and thyroid normal to palpation  RESP: lungs clear to auscultation - no rales, rhonchi or wheezes  CV: regular rate and rhythm, normal S1 S2, no S3 or S4, no murmur, click or rub, no peripheral edema and peripheral pulses strong  ABDOMEN: soft, nontender, no hepatosplenomegaly, no masses and bowel sounds normal  RECTAL: normal sphincter tone, no rectal masses, prostate normal size, smooth, nontender without nodules or masses  MS: trigger finger right hand 5th digit  SKIN: no suspicious lesions or rashes  NEURO: Normal strength and tone, mentation intact and speech normal  PSYCH: mentation appears normal, affect normal/bright    ASSESSMENT / PLAN:   (Z00.00) Routine general medical examination at a health care facility  (primary encounter diagnosis)  Comment: For routine exam, we will draw labs as ordered, cholesterol, diabetes mellitus check, liver function, renal function, PSA and request FIT tset - A negative FIT test indicates no evidence of colon cancer, but it should be repeated every year to have comparable sensitivity to that of a colonoscopy.   We will also update vaccination history  Plan: Fecal colorectal cancer screen (FIT),         Hemoglobin A1c, CBC with platelets, Lipid panel        reflex to direct LDL, Comprehensive metabolic         panel, Prostate spec antigen screen            (Z98.61) S/P PTCA (percutaneous transluminal coronary angioplasty)  Comment: Recommend starting Repatha as you have not been able to tolerate any statins.  Also, continue aspirin, ad blood pressure management   Plan:     (N18.2) CKD " "(chronic kidney disease) stage 2, GFR 60-89 ml/min  Comment: check renal function today  Plan:     (E78.5) Hyperlipidemia with target LDL less than 70  Comment: As above - Repatha ordered  Plan:     (I10) Essential hypertension  Comment: blood pressure is well controlled   Plan:     (K21.9) Gastroesophageal reflux disease without esophagitis  Comment: Aciphex ordered today  Plan:     (E78.00) Hypercholesteremia  Comment: as above   Plan: evolocumab (REPATHA) 140 MG/ML prefilled         autoinjector                    End of Life Planning:  Patient currently has an advanced directive: No.  I have verified the patient's ablity to prepare an advanced directive/make health care decisions.  Literature was provided to assist patient in preparing an advanced directive.    COUNSELING:  Reviewed preventive health counseling, as reflected in patient instructions    Estimated body mass index is 29.71 kg/(m^2) as calculated from the following:    Height as of 4/21/17: 5' 10.5\" (1.791 m).    Weight as of 4/21/17: 210 lb (95.3 kg).     reports that he has quit smoking. His smoking use included Cigarettes. He has a 8.00 pack-year smoking history. He has never used smokeless tobacco.        Appropriate preventive services were discussed with this patient, including applicable screening as appropriate for cardiovascular disease, diabetes, osteopenia/osteoporosis, and glaucoma.  As appropriate for age/gender, discussed screening for colorectal cancer, prostate cancer, breast cancer, and cervical cancer. Checklist reviewing preventive services available has been given to the patient.    Reviewed patients plan of care and provided an AVS. The Basic Care Plan (routine screening as documented in Health Maintenance) for Michael meets the Care Plan requirement. This Care Plan has been established and reviewed with the Patient.    Counseling Resources:  ATP IV Guidelines  Pooled Cohorts Equation Calculator  Breast Cancer Risk Calculator  FRAX " Risk Assessment  ICSI Preventive Guidelines  Dietary Guidelines for Americans, 2010  USDA's MyPlate  ASA Prophylaxis  Lung CA Screening    Josef Doll MD, MD  [facility]

## 2017-08-23 PROCEDURE — 82274 ASSAY TEST FOR BLOOD FECAL: CPT | Performed by: INTERNAL MEDICINE

## 2017-08-25 DIAGNOSIS — Z00.00 ROUTINE GENERAL MEDICAL EXAMINATION AT A HEALTH CARE FACILITY: ICD-10-CM

## 2017-08-25 LAB — HEMOCCULT STL QL IA: NEGATIVE

## 2017-11-15 ENCOUNTER — TELEPHONE (OUTPATIENT)
Dept: FAMILY MEDICINE | Facility: CLINIC | Age: 72
End: 2017-11-15

## 2017-11-15 NOTE — TELEPHONE ENCOUNTER
Reason for call:  Patient reporting a symptom    Symptom or request: Cold and running fever. Can he take repatha?    Duration (how long have symptoms been present): Couple of days    Have you been treated for this before? No    Additional comments: Pt would like to be contacted to discuss    Phone Number patient can be reached at:  Home number on file [phone] (home)    Best Time:  Anytime    Can we leave a detailed message on this number:  YES    Call taken on 11/15/2017 at 8:46 AM by Yessica Clancy

## 2017-11-15 NOTE — TELEPHONE ENCOUNTER
To MTM:  Please see below question regarding Repatha.  Please advise.  Thank you.  Nhung Villalba RN

## 2017-11-16 NOTE — TELEPHONE ENCOUNTER
Should be fine to take Repatha with cold/fever.    Myra Plummer, PharmD, [facility]  Medication Therapy Management Provider  Pager: [phone]

## 2017-12-15 ENCOUNTER — TELEPHONE (OUTPATIENT)
Dept: FAMILY MEDICINE | Facility: CLINIC | Age: 72
End: 2017-12-15

## 2017-12-15 DIAGNOSIS — E78.5 HYPERLIPIDEMIA WITH TARGET LDL LESS THAN 70: Primary | ICD-10-CM

## 2017-12-15 NOTE — TELEPHONE ENCOUNTER
Reason for Call:  Other Medication question    Detailed comments: evolocumab (REPATHA) 140 MG/ML prefilled autoinjector  The patient wants to know when he will get tested for the effectiveness of this medication  He said he keep getting calls from the [employer] people wanting to know if jhas been tested yet     Phone Number Patient can be reached at: Home number on file [phone] (home)    Best Time: anytime    Can we leave a detailed message on this number? YES    Call taken on 12/15/2017 at 12:50 PM by Kailyn Pearson

## 2017-12-19 DIAGNOSIS — E78.5 HYPERLIPIDEMIA WITH TARGET LDL LESS THAN 70: ICD-10-CM

## 2017-12-19 LAB
CHOLEST SERPL-MCNC: 168 MG/DL
HDLC SERPL-MCNC: 70 MG/DL
LDLC SERPL CALC-MCNC: 70 MG/DL
NONHDLC SERPL-MCNC: 98 MG/DL
TRIGL SERPL-MCNC: 139 MG/DL

## 2017-12-19 PROCEDURE — 36415 COLL VENOUS BLD VENIPUNCTURE: CPT | Performed by: INTERNAL MEDICINE

## 2017-12-19 PROCEDURE — 80061 LIPID PANEL: CPT | Performed by: INTERNAL MEDICINE

## 2018-01-11 ENCOUNTER — DOCUMENTATION ONLY (OUTPATIENT)
Dept: CARDIOLOGY | Facility: CLINIC | Age: 73
End: 2018-01-11

## 2018-01-11 DIAGNOSIS — E78.2 MIXED HYPERLIPIDEMIA: ICD-10-CM

## 2018-01-11 DIAGNOSIS — I10 HTN (HYPERTENSION): ICD-10-CM

## 2018-01-11 DIAGNOSIS — I25.10 CAD (CORONARY ARTERY DISEASE): Primary | ICD-10-CM

## 2018-01-11 NOTE — PROGRESS NOTES
Patient was approved for Repatha in September. Jean Paul RN provided injection training. Lipid panel done by PMD 12/19/17. LDL 70. Will inform Dr. Betancourt and Team.

## 2018-02-07 ENCOUNTER — TELEPHONE (OUTPATIENT)
Dept: FAMILY MEDICINE | Facility: CLINIC | Age: 73
End: 2018-02-07

## 2018-02-07 DIAGNOSIS — K21.00 GASTROESOPHAGEAL REFLUX DISEASE WITH ESOPHAGITIS: ICD-10-CM

## 2018-02-07 RX ORDER — RABEPRAZOLE SODIUM 20 MG/1
20 TABLET, DELAYED RELEASE ORAL DAILY
Qty: 90 TABLET | Refills: 2 | Status: SHIPPED | OUTPATIENT
Start: 2018-02-07 | End: 2018-02-28

## 2018-02-07 NOTE — TELEPHONE ENCOUNTER
Reason for Call:  Medication or medication refill:     Do you use a [facility]?  Name of the pharmacy and phone number for the current request:   [facility] [place] - [address] AT PORTAL TO REGISTERED [facility] SITES    Name of the medication requested: RABEprazole (ACIPHEX) 20 MG EC tablet     Other request: Megan from [employer] calling, states they never received this rx on 8/18/17    Can we leave a detailed message on this number? Not Applicable    Phone number patient can be reached at: Other phone number:  [facility] #_ [phone], reference # [id number]    Best Time: any    Call taken on 2/7/2018 at 8:11 AM by Abby Hinojosa

## 2018-02-28 DIAGNOSIS — I25.10 ASCVD (ARTERIOSCLEROTIC CARDIOVASCULAR DISEASE): ICD-10-CM

## 2018-02-28 DIAGNOSIS — K21.00 GASTROESOPHAGEAL REFLUX DISEASE WITH ESOPHAGITIS: ICD-10-CM

## 2018-02-28 DIAGNOSIS — E78.00 HYPERCHOLESTEREMIA: ICD-10-CM

## 2018-02-28 RX ORDER — RABEPRAZOLE SODIUM 20 MG/1
20 TABLET, DELAYED RELEASE ORAL DAILY
Qty: 90 TABLET | Refills: 1 | Status: SHIPPED | OUTPATIENT
Start: 2018-02-28 | End: 2018-08-27

## 2018-02-28 RX ORDER — ATENOLOL 50 MG/1
25 TABLET ORAL DAILY
Qty: 45 TABLET | Refills: 1 | Status: SHIPPED | OUTPATIENT
Start: 2018-02-28 | End: 2018-08-27

## 2018-02-28 NOTE — TELEPHONE ENCOUNTER
"Requested Prescriptions   Pending Prescriptions Disp Refills     RABEprazole (ACIPHEX) 20 MG EC tablet 90 tablet 2     Sig: Take 1 tablet (20 mg) by mouth daily    PPI Protocol Passed    2/28/2018  9:49 AM       Passed - Not on Clopidogrel (unless Pantoprazole ordered)       Passed - No diagnosis of osteoporosis on record       Passed - Recent or future visit with authorizing provider's specialty    Patient had office visit in the last year or has a visit in the next 30 days with authorizing provider.  See \"Patient Info\" tab in inbasket, or \"Choose Columns\" in Meds & Orders section of the refill encounter.            Passed - Patient is age 18 or older        atenolol (TENORMIN) 50 MG tablet 90 tablet 3     Sig: Take 0.5 tablets (25 mg) by mouth daily    Beta-Blockers Protocol Passed    2/28/2018  9:49 AM       Passed - Blood pressure under 140/90 in past 12 months    BP Readings from Last 3 Encounters:   08/18/17 134/80   04/21/17 132/78   07/29/16 126/86                Passed - Patient is age 6 or older       Passed - Recent or future visit with authorizing provider's specialty    Patient had office visit in the last year or has a visit in the next 30 days with authorizing provider.  See \"Patient Info\" tab in inbasket, or \"Choose Columns\" in Meds & Orders section of the refill encounter.               "

## 2018-02-28 NOTE — TELEPHONE ENCOUNTER
Reason for Call:  Medication or medication refill:    Do you use a [facility]?  Name of the pharmacy and phone number for the current request:     [id number] - [place], [place] - [address]        Name of the medication requested: RABEprazole (ACIPHEX) 20 MG EC tablet and    atenolol (TENORMIN) 50 MG tablet    Other request:     Can we leave a detailed message on this number? YES    Phone number patient can be reached at: Home number on file [phone] (home)    Best Time: any    Call taken on 2/28/2018 at 8:58 AM by Goldie Amos

## 2018-08-02 ENCOUNTER — TELEPHONE (OUTPATIENT)
Dept: CARDIOLOGY | Facility: CLINIC | Age: 73
End: 2018-08-02

## 2018-08-02 NOTE — PROGRESS NOTES
RN called pt to discuss the re-authorization for Repatha. In reviewing the chart-Repatha refills were done by his PMD.Last OV with Dr. Betancourt on 4/21/17. RN advised pt he needs to make his annual apt w/Dr. Betancourt. Pt states he is going on Medicare at the end of the month. Pt was unable to talk due to being at work. RN provided contact information to discuss options for [facility] once on Medicare. Pt stated he will call back.

## 2018-08-21 NOTE — PATIENT INSTRUCTIONS
Preventive Health Recommendations:       Male Ages 65 and over    Yearly exam:             See your health care provider every year in order to  o   Review health changes.   o   Discuss preventive care.    o   Review your medicines if your doctor has prescribed any.    Talk with your health care provider about whether you should have a test to screen for prostate cancer (PSA).    Every 3 years, have a diabetes test (fasting glucose). If you are at risk for diabetes, you should have this test more often.    Every 5 years, have a cholesterol test. Have this test more often if you are at risk for high cholesterol or heart disease.     Every 10 years, have a colonoscopy. Or, have a yearly FIT test (stool test). These exams will check for colon cancer.    Talk to with your health care provider about screening for Abdominal Aortic Aneurysm if you have a family history of AAA or have a history of smoking.  Shots:     Get a flu shot each year.     Get a tetanus shot every 10 years.     Talk to your doctor about your pneumonia vaccines. There are now two you should receive - Pneumovax (PPSV 23) and Prevnar (PCV 13).    Talk to your pharmacist about a shingles vaccine.     Talk to your doctor about the hepatitis B vaccine.  Nutrition:     Eat at least 5 servings of fruits and vegetables each day.     Eat whole-grain bread, whole-wheat pasta and brown rice instead of white grains and rice.     Get adequate Calcium and Vitamin D.   Lifestyle    Exercise for at least 150 minutes a week (30 minutes a day, 5 days a week). This will help you control your weight and prevent disease.     Limit alcohol to one drink per day.     No smoking.     Wear sunscreen to prevent skin cancer.     See your dentist every six months for an exam and cleaning.     See your eye doctor every 1 to 2 years to screen for conditions such as glaucoma, macular degeneration and cataracts.    (Z00.00) Routine general medical examination at a [facility]  facility  (primary encounter diagnosis)  Comment: For routine exam, we will draw labs as ordered, cholesterol, diabetes mellitus check, liver function, renal function, PSA and request FIT test.  We will also update vaccination history.  Shingrix vaccine is now available.  I would call your insurance to see if a shingles vaccine is covered and get this at your pharmacy   Plan: CBC with platelets, Lipid panel reflex to         direct LDL Fasting, Comprehensive metabolic         panel, Prostate spec antigen screen            (Z12.11) Screen for colon cancer  Comment: A negative FIT test indicates no evidence of colon cancer, but it should be repeated every year to have comparable sensitivity to that of a colonoscopy.    Plan: Fecal colorectal cancer screen (FIT)            (K22.4) Esophageal dysmotility  Comment: Doing great - OK to use Zegerid as needed   Plan: omeprazole-sodium bicarbonate (ZEGERID)         MG per capsule            (K21.0) Gastroesophageal reflux disease with esophagitis  Comment: Continue daily aciphex  Plan: RABEprazole (ACIPHEX) 20 MG EC tablet            (I25.10) ASCVD (arteriosclerotic cardiovascular disease)  Comment: Continue on aspirin, repatha and beta blocker   Plan: Albumin Random Urine Quantitative with Creat         Ratio, atenolol (TENORMIN) 50 MG tablet            (E78.00) Hypercholesteremia  Comment: as above   Plan: evolocumab (REPATHA) 140 MG/ML prefilled         autoinjector

## 2018-08-21 NOTE — PROGRESS NOTES
SUBJECTIVE:   Michael Seals is a 73 year old male who presents for Preventive Visit.      Are you in the first 12 months of your Medicare Part B coverage?  No    Healthy Habits:    Do you get at least three servings of calcium containing foods daily (dairy, green leafy vegetables, etc.)? yes    Amount of exercise or daily activities, outside of work: 4 day(s) per week    Problems taking medications regularly No    Medication side effects: No    Have you had an eye exam in the past two years? yes    Do you see a dentist twice per year? yes    Do you have sleep apnea, excessive snoring or daytime drowsiness?no      Ability to successfully perform activities of daily living: Yes, no assistance needed    Home safety:  none identified     Hearing impairment: No    Fall risk:  Fallen 2 or more times in the past year?: No  Any fall with injury in the past year?: No        COGNITIVE SCREEN  1) Repeat 3 items (Leader, Season, Table)    2) Clock draw: NORMAL  3) 3 item recall: Recalls 3 objects  Results: 3 items recalled: COGNITIVE IMPAIRMENT LESS LIKELY    Mini-CogTM Copyright CONRAD Leigh. Licensed by the author for use in [facility]; reprinted with permission (soob@[facility]). All rights reserved.        Reviewed and updated as needed this visit by clinical staff         Reviewed and updated as needed this visit by Provider        Social History   Substance Use Topics     Smoking status: Former Smoker     Packs/day: 1.00     Years: 8.00     Types: Cigarettes     Smokeless tobacco: Never Used      Comment: quit 1970     Alcohol use 6.0 oz/week     10 Standard drinks or equivalent per week      Comment: 1-2 glasses of wine per evening with dinner       If you drink alcohol do you typically have >3 drinks per day or >7 drinks per week? No                        Today's PHQ-2 Score:   PHQ-2 ( 1999 Pf[employer]) 8/18/2017 7/29/2016   Q1: Little interest or pleasure in doing things 0 0   Q2: Feeling down, depressed or  "hopeless 0 0   PHQ-2 Score 0 0       Do you feel safe in your environment - Yes    Do you have a Health Care Directive?: No: Advance care planning reviewed with patient; information given to patient to review.    Current providers sharing in care for this patient include:   Patient Care Team:  Josef Doll MD as PCP - General (Internal Medicine)    The following health maintenance items are reviewed in Epic and correct as of today:  Health Maintenance   Topic Date Due     MICROALBUMIN Q1 YEAR  08/09/1946     ADVANCE DIRECTIVE PLANNING Q5 YRS  08/09/1998     DEPRESSION ACTION PLAN Q1 YR  01/24/2015     PHQ-9 Q6 MONTHS  01/29/2017     BMP Q1 YR  08/18/2018     FALL RISK ASSESSMENT  08/18/2018     FIT Q1 YR  08/23/2018     INFLUENZA VACCINE (1) 09/01/2018     LIPID MONITORING Q1 YEAR  12/19/2018     TETANUS IMMUNIZATION (SYSTEM ASSIGNED)  07/01/2020     PNEUMOCOCCAL  Completed     AORTIC ANEURYSM SCREENING (SYSTEM ASSIGNED)  Completed     Labs reviewed in EPIC    ROS:  Constitutional, HEENT, cardiovascular - notes heart rate is governed when taking atenolol as opposed to when he is not taking, pulmonary, GI, , musculoskeletal (knee pain), neuro, skin, endocrine and psych systems are negative, except as otherwise noted.    OBJECTIVE:   /81 (BP Location: Left arm, Patient Position: Chair, Cuff Size: Adult Large)  Pulse 51  Temp 97.4  F (36.3  C) (Tympanic)  Ht 5' 10.5\" (1.791 m)  Wt 203 lb 14.4 oz (92.5 kg)  SpO2 96%  BMI 28.84 kg/m2 Estimated body mass index is 28.84 kg/(m^2) as calculated from the following:    Height as of this encounter: 5' 10.5\" (1.791 m).    Weight as of this encounter: 203 lb 14.4 oz (92.5 kg).  EXAM:   GENERAL: healthy, alert and no distress  EYES: Eyes grossly normal to inspection, PERRL and conjunctivae and sclerae normal  HENT: ear canals and TM's normal, nose and mouth without ulcers or lesions  NECK: no adenopathy, no asymmetry, masses, or scars and thyroid normal " to palpation  RESP: lungs clear to auscultation - no rales, rhonchi or wheezes  CV: regular rate and rhythm, normal S1 S2, no S3 or S4, no murmur, click or rub, no peripheral edema and peripheral pulses strong  ABDOMEN: soft, nontender, no hepatosplenomegaly, no masses and bowel sounds normal  MS: no gross musculoskeletal defects noted, no edema  SKIN: no suspicious lesions or rashes  NEURO: Normal strength and tone, mentation intact and speech normal  PSYCH: mentation appears normal, affect normal/bright    Diagnostic Test Results:  none     ASSESSMENT / PLAN:   (Z00.00) Routine general medical examination at a health care facility  (primary encounter diagnosis)  Comment: For routine exam, we will draw labs as ordered, cholesterol, diabetes mellitus check, liver function, renal function, PSA and request FIT test.  We will also update vaccination history.  Shingrix vaccine is now available.  I would call your insurance to see if a shingles vaccine is covered and get this at your pharmacy   Plan: CBC with platelets, Lipid panel reflex to         direct LDL Fasting, Comprehensive metabolic         panel, Prostate spec antigen screen            (Z12.11) Screen for colon cancer  Comment: A negative FIT test indicates no evidence of colon cancer, but it should be repeated every year to have comparable sensitivity to that of a colonoscopy.    Plan: Fecal colorectal cancer screen (FIT)            (K22.4) Esophageal dysmotility  Comment: Doing great - OK to use Zegerid as needed   Plan: omeprazole-sodium bicarbonate (ZEGERID)         MG per capsule            (K21.0) Gastroesophageal reflux disease with esophagitis  Comment: Continue daily aciphex  Plan: RABEprazole (ACIPHEX) 20 MG EC tablet            (I25.10) ASCVD (arteriosclerotic cardiovascular disease)  Comment: Continue on aspirin, repatha and beta blocker   Plan: Albumin Random Urine Quantitative with Creat         Ratio, atenolol (TENORMIN) 50 MG tablet       "      (E78.00) Hypercholesteremia  Comment: as above   Plan: evolocumab (REPATHA) 140 MG/ML prefilled         autoinjector        End of Life Planning:  Patient currently has an advanced directive: Yes.  Practitioner is supportive of decision.    COUNSELING:  Reviewed preventive health counseling, as reflected in patient instructions    BP Readings from Last 1 Encounters:   08/18/17 134/80     Estimated body mass index is 29.14 kg/(m^2) as calculated from the following:    Height as of 8/18/17: 5' 10.5\" (1.791 m).    Weight as of 8/18/17: 206 lb (93.4 kg).           reports that he has quit smoking. His smoking use included Cigarettes. He has a 8.00 pack-year smoking history. He has never used smokeless tobacco.      Appropriate preventive services were discussed with this patient, including applicable screening as appropriate for cardiovascular disease, diabetes, osteopenia/osteoporosis, and glaucoma.  As appropriate for age/gender, discussed screening for colorectal cancer, prostate cancer, breast cancer, and cervical cancer. Checklist reviewing preventive services available has been given to the patient.    Reviewed patients plan of care and provided an AVS. The  for Michael meets the Care Plan requirement. This Care Plan has been established and reviewed with the Patient.    Counseling Resources:  ATP IV Guidelines  Pooled Cohorts Equation Calculator  Breast Cancer Risk Calculator  FRAX Risk Assessment  ICSI Preventive Guidelines  Dietary Guidelines for Americans, 2010  USDA's MyPlate  ASA Prophylaxis  Lung CA Screening    Josfe Doll MD, MD  [facility]  "

## 2018-08-27 ENCOUNTER — OFFICE VISIT (OUTPATIENT)
Dept: FAMILY MEDICINE | Facility: CLINIC | Age: 73
End: 2018-08-27
Payer: COMMERCIAL

## 2018-08-27 VITALS
HEART RATE: 51 BPM | DIASTOLIC BLOOD PRESSURE: 81 MMHG | BODY MASS INDEX: 28.55 KG/M2 | OXYGEN SATURATION: 96 % | WEIGHT: 203.9 LBS | TEMPERATURE: 97.4 F | HEIGHT: 71 IN | SYSTOLIC BLOOD PRESSURE: 138 MMHG

## 2018-08-27 DIAGNOSIS — K21.00 GASTROESOPHAGEAL REFLUX DISEASE WITH ESOPHAGITIS: ICD-10-CM

## 2018-08-27 DIAGNOSIS — Z12.11 SCREEN FOR COLON CANCER: ICD-10-CM

## 2018-08-27 DIAGNOSIS — Z00.00 ROUTINE GENERAL MEDICAL EXAMINATION AT A HEALTH CARE FACILITY: Primary | ICD-10-CM

## 2018-08-27 DIAGNOSIS — E78.00 HYPERCHOLESTEREMIA: ICD-10-CM

## 2018-08-27 DIAGNOSIS — I25.10 ASCVD (ARTERIOSCLEROTIC CARDIOVASCULAR DISEASE): ICD-10-CM

## 2018-08-27 DIAGNOSIS — K22.4 ESOPHAGEAL DYSMOTILITY: ICD-10-CM

## 2018-08-27 LAB
ALBUMIN SERPL-MCNC: 3.7 G/DL (ref 3.4–5)
ALP SERPL-CCNC: 96 U/L (ref 40–150)
ALT SERPL W P-5'-P-CCNC: 33 U/L (ref 0–70)
ANION GAP SERPL CALCULATED.3IONS-SCNC: 6 MMOL/L (ref 3–14)
AST SERPL W P-5'-P-CCNC: 26 U/L (ref 0–45)
BILIRUB SERPL-MCNC: 0.4 MG/DL (ref 0.2–1.3)
BUN SERPL-MCNC: 19 MG/DL (ref 7–30)
CALCIUM SERPL-MCNC: 9 MG/DL (ref 8.5–10.1)
CHLORIDE SERPL-SCNC: 107 MMOL/L (ref 94–109)
CHOLEST SERPL-MCNC: 184 MG/DL
CO2 SERPL-SCNC: 26 MMOL/L (ref 20–32)
CREAT SERPL-MCNC: 1.16 MG/DL (ref 0.66–1.25)
CREAT UR-MCNC: 275 MG/DL
ERYTHROCYTE [DISTWIDTH] IN BLOOD BY AUTOMATED COUNT: 12.5 % (ref 10–15)
GFR SERPL CREATININE-BSD FRML MDRD: 61 ML/MIN/1.7M2
GLUCOSE SERPL-MCNC: 114 MG/DL (ref 70–99)
HCT VFR BLD AUTO: 42.7 % (ref 40–53)
HDLC SERPL-MCNC: 57 MG/DL
HGB BLD-MCNC: 14.6 G/DL (ref 13.3–17.7)
LDLC SERPL CALC-MCNC: 87 MG/DL
MCH RBC QN AUTO: 33 PG (ref 26.5–33)
MCHC RBC AUTO-ENTMCNC: 34.2 G/DL (ref 31.5–36.5)
MCV RBC AUTO: 97 FL (ref 78–100)
MICROALBUMIN UR-MCNC: 1540 MG/L
MICROALBUMIN/CREAT UR: 560 MG/G CR (ref 0–17)
NONHDLC SERPL-MCNC: 127 MG/DL
PLATELET # BLD AUTO: 216 10E9/L (ref 150–450)
POTASSIUM SERPL-SCNC: 4.7 MMOL/L (ref 3.4–5.3)
PROT SERPL-MCNC: 7.6 G/DL (ref 6.8–8.8)
PSA SERPL-ACNC: 1.5 UG/L (ref 0–4)
RBC # BLD AUTO: 4.42 10E12/L (ref 4.4–5.9)
SODIUM SERPL-SCNC: 139 MMOL/L (ref 133–144)
TRIGL SERPL-MCNC: 198 MG/DL
WBC # BLD AUTO: 5.3 10E9/L (ref 4–11)

## 2018-08-27 PROCEDURE — 99397 PER PM REEVAL EST PAT 65+ YR: CPT | Performed by: INTERNAL MEDICINE

## 2018-08-27 PROCEDURE — 82043 UR ALBUMIN QUANTITATIVE: CPT | Performed by: INTERNAL MEDICINE

## 2018-08-27 PROCEDURE — G0103 PSA SCREENING: HCPCS | Performed by: INTERNAL MEDICINE

## 2018-08-27 PROCEDURE — 80053 COMPREHEN METABOLIC PANEL: CPT | Performed by: INTERNAL MEDICINE

## 2018-08-27 PROCEDURE — 85027 COMPLETE CBC AUTOMATED: CPT | Performed by: INTERNAL MEDICINE

## 2018-08-27 PROCEDURE — 80061 LIPID PANEL: CPT | Performed by: INTERNAL MEDICINE

## 2018-08-27 PROCEDURE — 36415 COLL VENOUS BLD VENIPUNCTURE: CPT | Performed by: INTERNAL MEDICINE

## 2018-08-27 RX ORDER — ATENOLOL 50 MG/1
25 TABLET ORAL DAILY
Qty: 45 TABLET | Refills: 3 | Status: SHIPPED | OUTPATIENT
Start: 2018-08-27 | End: 2019-08-15

## 2018-08-27 RX ORDER — OMEPRAZOLE AND SODIUM BICARBONATE 40; 1100 MG/1; MG/1
1 CAPSULE ORAL DAILY PRN
Qty: 30 CAPSULE | Refills: 3 | Status: SHIPPED | OUTPATIENT
Start: 2018-08-27 | End: 2019-09-11

## 2018-08-27 RX ORDER — RABEPRAZOLE SODIUM 20 MG/1
20 TABLET, DELAYED RELEASE ORAL DAILY
Qty: 90 TABLET | Refills: 3 | Status: SHIPPED | OUTPATIENT
Start: 2018-08-27 | End: 2019-09-11

## 2018-08-27 NOTE — MR AVS SNAPSHOT
After Visit Summary   8/27/2018    Michael Seals    MRN: [id number]           Patient Information     Date Of Birth          1945        Visit Information        Provider Department      8/27/2018 8:30 AM Josef Doll MD [facility]        Today's Diagnoses     Routine general medical examination at a health care facility    -  1    Screen for colon cancer        Esophageal dysmotility        Gastroesophageal reflux disease with esophagitis        ASCVD (arteriosclerotic cardiovascular disease)        Hypercholesteremia          Care Instructions      Preventive Health Recommendations:       Male Ages 65 and over    Yearly exam:             See your health care provider every year in order to  o   Review health changes.   o   Discuss preventive care.    o   Review your medicines if your doctor has prescribed any.    Talk with your health care provider about whether you should have a test to screen for prostate cancer (PSA).    Every 3 years, have a diabetes test (fasting glucose). If you are at risk for diabetes, you should have this test more often.    Every 5 years, have a cholesterol test. Have this test more often if you are at risk for high cholesterol or heart disease.     Every 10 years, have a colonoscopy. Or, have a yearly FIT test (stool test). These exams will check for colon cancer.    Talk to with your health care provider about screening for Abdominal Aortic Aneurysm if you have a family history of AAA or have a history of smoking.  Shots:     Get a flu shot each year.     Get a tetanus shot every 10 years.     Talk to your doctor about your pneumonia vaccines. There are now two you should receive - Pneumovax (PPSV 23) and Prevnar (PCV 13).    Talk to your pharmacist about a shingles vaccine.     Talk to your doctor about the hepatitis B vaccine.  Nutrition:     Eat at least 5 servings of fruits and vegetables each day.     Eat whole-grain bread, whole-wheat  pasta and brown rice instead of white grains and rice.     Get adequate Calcium and Vitamin D.   Lifestyle    Exercise for at least 150 minutes a week (30 minutes a day, 5 days a week). This will help you control your weight and prevent disease.     Limit alcohol to one drink per day.     No smoking.     Wear sunscreen to prevent skin cancer.     See your dentist every six months for an exam and cleaning.     See your eye doctor every 1 to 2 years to screen for conditions such as glaucoma, macular degeneration and cataracts.    (Z00.00) Routine general medical examination at a health care facility  (primary encounter diagnosis)  Comment: For routine exam, we will draw labs as ordered, cholesterol, diabetes mellitus check, liver function, renal function, PSA and request FIT test.  We will also update vaccination history.  Shingrix vaccine is now available.  I would call your insurance to see if a shingles vaccine is covered and get this at your pharmacy   Plan: CBC with platelets, Lipid panel reflex to         direct LDL Fasting, Comprehensive metabolic         panel, Prostate spec antigen screen            (Z12.11) Screen for colon cancer  Comment: A negative FIT test indicates no evidence of colon cancer, but it should be repeated every year to have comparable sensitivity to that of a colonoscopy.    Plan: Fecal colorectal cancer screen (FIT)            (K22.4) Esophageal dysmotility  Comment: Doing great - OK to use Zegerid as needed   Plan: omeprazole-sodium bicarbonate (ZEGERID)         MG per capsule            (K21.0) Gastroesophageal reflux disease with esophagitis  Comment: Continue daily aciphex  Plan: RABEprazole (ACIPHEX) 20 MG EC tablet            (I25.10) ASCVD (arteriosclerotic cardiovascular disease)  Comment: Continue on aspirin, repatha and beta blocker   Plan: Albumin Random Urine Quantitative with Creat         Ratio, atenolol (TENORMIN) 50 MG tablet            (E78.00)  "Hypercholesteremia  Comment: as above   Plan: evolocumab (REPATHA) 140 MG/ML prefilled         autoinjector          Follow-ups after your visit        Future tests that were ordered for you today     Open Future Orders        Priority Expected Expires Ordered    Fecal colorectal cancer screen (FIT) Routine 9/17/2018 11/19/2018 8/27/2018            Who to contact     If you have questions or need follow up information about today's clinic visit or your schedule please contact [facility] directly at [phone].  Normal or non-critical lab and imaging results will be communicated to you by MyChart, letter or phone within 4 business days after the clinic has received the results. If you do not hear from us within 7 days, please contact the clinic through MyChart or phone. If you have a critical or abnormal lab result, we will notify you by phone as soon as possible.  Submit refill requests through [employer] or call your pharmacy and they will forward the refill request to us. Please allow 3 business days for your refill to be completed.          Additional Information About Your Visit        Care EveryWhere ID     This is your Care EveryWhere ID. This could be used by other organizations to access your [facility] medical records  [id number]        Your Vitals Were     Pulse Temperature Height Pulse Oximetry BMI (Body Mass Index)       51 97.4  F (36.3  C) (Tympanic) 5' 10.5\" (1.791 m) 96% 28.84 kg/m2        Blood Pressure from Last 3 Encounters:   08/27/18 138/81   08/18/17 134/80   04/21/17 132/78    Weight from Last 3 Encounters:   08/27/18 203 lb 14.4 oz (92.5 kg)   08/18/17 206 lb (93.4 kg)   04/21/17 210 lb (95.3 kg)              We Performed the Following     Albumin Random Urine Quantitative with Creat Ratio     CBC with platelets     Comprehensive metabolic panel     Lipid panel reflex to direct LDL Fasting     Prostate spec antigen screen          Where to get your medicines      These medications " were sent to [facility] - [place], [place] - [address] [facility]  [address], [facility] [address]     Phone:  [phone]     evolocumab 140 MG/ML prefilled autoinjector         These medications were sent to [id number] - [place], [place] - [address]  [address] [place] [address]     Phone:  [phone]     atenolol 50 MG tablet    omeprazole-sodium bicarbonate  MG per capsule    RABEprazole 20 MG EC tablet          Primary Care Provider Office Phone # Fax #    Josef Doll [phone] [address] [address]        Equal Access to Services     [facility]: Hadii jarrod camarena hadasho Sokemal, waaxda luqadaha, qaybta kaalmada adeegyada, arden vides . So [facility] [phone].    ATENCIÓN: Si habla español, tiene a banda disposición servicios gratuitos de asistencia lingüística. [facility] [phone].    We comply with applicable federal civil rights laws and [place] laws. We do not discriminate on the basis of race, color, national origin, age, disability, sex, sexual orientation, or gender identity.            Thank you!     Thank you for choosing [facility]  for your care. Our goal is always to provide you with excellent care. Hearing back from our patients is one way we can continue to improve our services. Please take a few minutes to complete the written survey that you may receive in the mail after your visit with us. Thank you!             Your Updated Medication List - Protect others around you: Learn how to safely use, store and throw away your medicines at www.disposemymeds.org.          This list is accurate as of 8/27/18  8:54 AM.  Always use your most recent med list.                   Brand Name Dispense Instructions for use Diagnosis    ACE NOT PRESCRIBED (INTENTIONAL)      continuous prn Reported on 4/21/2017         atenolol 50 MG tablet    TENORMIN    45 tablet    Take 0.5 tablets (25 mg) by mouth daily    ASCVD (arteriosclerotic cardiovascular disease)       evolocumab 140 MG/ML prefilled autoinjector    REPATHA    6 mL    Inject 1 mL (140 mg) Subcutaneous every 14 days    Hypercholesteremia       MULTIVITAMIN PO      Take by mouth daily        nitroGLYcerin 0.4 MG sublingual tablet    NITROSTAT    90 tablet    Place 1 tablet (0.4 mg) under the tongue every 5 minutes as needed for chest pain    ASCVD (arteriosclerotic cardiovascular disease)       omeprazole-sodium bicarbonate  MG per capsule    ZEGERID    30 capsule    Take 1 capsule (40 mg) by mouth daily as needed    Esophageal dysmotility       RABEprazole 20 MG EC tablet    ACIPHEX    90 tablet    Take 1 tablet (20 mg) by mouth daily    Gastroesophageal reflux disease with esophagitis       SB LOW DOSE ASA EC 81 MG EC tablet   Generic drug:  aspirin      Take 81 mg by mouth daily.        STATIN NOT PRESCRIBED (INTENTIONAL)      Please choose reason not prescribed, below    Hypercholesteremia, Coronary artery disease involving native coronary artery of native heart without angina pectoris

## 2018-08-27 NOTE — LETTER
[address]  [address]  [place]  [address]  Tel: [phone]    August 28, 2018    Michael Seals  [address]  [place] [address]        Dear Mr. Seals,    I had the opportunity to review your recent labs and a summary of your labs reads as follows:    Your complete blood counts show no sign of anemia, normal white blood cell count and platelets.  Your comprehensive metabolic panel showed normal renal function, normal liver function, and stable elevated fasting blood glucose indicating no evidence of diabetes mellitus.  Your fasting lipid panel show  - normal HDL (good) cholesterol -as your goal is greater than 40  - stable LDL (bad) cholesterol as your goal is less than 70  - stable triglyceride levels  Your PSA level is also stable indicating no evidence of prostate cancer    Your urine protein was quite elevated and it would make sense to consider adding a medication such as lisinopril to help with renal protection    If you have any further questions or problems, please contact our office.      Sincerely,    Josef Doll MD/ Stacie Land CMA  Results for orders placed or performed in visit on 08/27/18   Albumin Random Urine Quantitative with Creat Ratio   Result Value Ref Range    Creatinine Urine 275 mg/dL    Albumin Urine mg/L 1540 mg/L    Albumin Urine mg/g Cr 560.00 (H) 0 - 17 mg/g Cr   CBC with platelets   Result Value Ref Range    WBC 5.3 4.0 - 11.0 10e9/L    RBC Count 4.42 4.4 - 5.9 10e12/L    Hemoglobin 14.6 13.3 - 17.7 g/dL    Hematocrit 42.7 40.0 - 53.0 %    MCV 97 78 - 100 fl    MCH 33.0 26.5 - 33.0 pg    MCHC 34.2 31.5 - 36.5 g/dL    RDW 12.5 10.0 - 15.0 %    Platelet Count 216 150 - 450 10e9/L   Lipid panel reflex to direct LDL Fasting   Result Value Ref Range    Cholesterol 184 <200 mg/dL    Triglycerides 198 (H) <150 mg/dL    HDL Cholesterol 57 >39 mg/dL    LDL Cholesterol Calculated 87 <100 mg/dL    Non HDL Cholesterol 127 <130 mg/dL   Comprehensive  metabolic panel   Result Value Ref Range    Sodium 139 133 - 144 mmol/L    Potassium 4.7 3.4 - 5.3 mmol/L    Chloride 107 94 - 109 mmol/L    Carbon Dioxide 26 20 - 32 mmol/L    Anion Gap 6 3 - 14 mmol/L    Glucose 114 (H) 70 - 99 mg/dL    Urea Nitrogen 19 7 - 30 mg/dL    Creatinine 1.16 0.66 - 1.25 mg/dL    GFR Estimate 61 >60 mL/min/1.7m2    GFR Estimate If Black 74 >60 mL/min/1.7m2    Calcium 9.0 8.5 - 10.1 mg/dL    Bilirubin Total 0.4 0.2 - 1.3 mg/dL    Albumin 3.7 3.4 - 5.0 g/dL    Protein Total 7.6 6.8 - 8.8 g/dL    Alkaline Phosphatase 96 40 - 150 U/L    ALT 33 0 - 70 U/L    AST 26 0 - 45 U/L   Prostate spec antigen screen   Result Value Ref Range    PSA 1.50 0 - 4 ug/L               Enclosure: Lab Results

## 2018-08-28 ENCOUNTER — HOSPITAL ENCOUNTER (OUTPATIENT)
Facility: CLINIC | Age: 73
Setting detail: SPECIMEN
Discharge: HOME | End: 2018-08-28
Admitting: INTERNAL MEDICINE
Payer: COMMERCIAL

## 2018-08-28 ENCOUNTER — TELEPHONE (OUTPATIENT)
Dept: FAMILY MEDICINE | Facility: CLINIC | Age: 73
End: 2018-08-28

## 2018-08-28 DIAGNOSIS — I10 ESSENTIAL HYPERTENSION: Primary | ICD-10-CM

## 2018-08-28 PROCEDURE — 82274 ASSAY TEST FOR BLOOD FECAL: CPT | Performed by: INTERNAL MEDICINE

## 2018-08-28 RX ORDER — LISINOPRIL 2.5 MG/1
2.5 TABLET ORAL DAILY
Qty: 30 TABLET | Refills: 3 | Status: SHIPPED | OUTPATIENT
Start: 2018-08-28 | End: 2019-05-21

## 2018-08-28 NOTE — TELEPHONE ENCOUNTER
Can we call Michael Seals and let him know that     I had the opportunity to review your recent labs and a summary of your labs reads as follows:    Your complete blood counts show no sign of anemia, normal white blood cell count and platelets.  Your comprehensive metabolic panel showed normal renal function, normal liver function, and stable elevated fasting blood glucose indicating no evidence of diabetes mellitus.  Your fasting lipid panel show  - normal HDL (good) cholesterol -as your goal is greater than 40  - stable LDL (bad) cholesterol as your goal is less than 70  - stable triglyceride levels  Your PSA level is also stable indicating no evidence of prostate cancer    Your urine protein was quite elevated and it would make sense to consider adding a medication such as lisinopril to help with renal protection

## 2018-08-28 NOTE — TELEPHONE ENCOUNTER
He is already on a low dose of atenolol.  I would recommend that we continue current dose of atenolol and we can add in a very low dose of lisinopril 2.5 mg daily.    Josef Doll MD

## 2018-08-28 NOTE — TELEPHONE ENCOUNTER
Spoke with patient:     Relayed message below. He was driving and would like to call back to discuss recommendation to start a:   medication such as lisinopril to help with renal protection          Will await return call

## 2018-08-28 NOTE — TELEPHONE ENCOUNTER
Spoke with patient:   BP has been running in mid-120s/70s   He is ok with starting a med such as Lisinopril to help with kidney function but is he worried about BP going too low, and asking if he should cut back on/stop Atenolol?  [facility] in [place] is patient's preferred pharmacy - pended     Please advise     Kendra ABDULLAHI RN

## 2018-08-31 DIAGNOSIS — Z12.11 SCREEN FOR COLON CANCER: ICD-10-CM

## 2018-08-31 LAB — HEMOCCULT STL QL IA: NEGATIVE

## 2018-12-06 ENCOUNTER — OFFICE VISIT (OUTPATIENT)
Dept: CARDIOLOGY | Facility: CLINIC | Age: 73
End: 2018-12-06
Attending: INTERNAL MEDICINE
Payer: COMMERCIAL

## 2018-12-06 VITALS
HEART RATE: 48 BPM | SYSTOLIC BLOOD PRESSURE: 136 MMHG | WEIGHT: 208 LBS | HEIGHT: 71 IN | BODY MASS INDEX: 29.12 KG/M2 | DIASTOLIC BLOOD PRESSURE: 80 MMHG

## 2018-12-06 DIAGNOSIS — I10 ESSENTIAL HYPERTENSION: ICD-10-CM

## 2018-12-06 DIAGNOSIS — E78.2 MIXED HYPERLIPIDEMIA: ICD-10-CM

## 2018-12-06 DIAGNOSIS — I25.10 CORONARY ARTERY DISEASE INVOLVING NATIVE CORONARY ARTERY OF NATIVE HEART WITHOUT ANGINA PECTORIS: ICD-10-CM

## 2018-12-06 DIAGNOSIS — E78.5 HYPERLIPIDEMIA WITH TARGET LDL LESS THAN 70: Primary | ICD-10-CM

## 2018-12-06 PROCEDURE — 99214 OFFICE O/P EST MOD 30 MIN: CPT | Performed by: INTERNAL MEDICINE

## 2018-12-06 NOTE — LETTER
12/6/2018    Josef Doll MD, MD  [address] [facility] [address]  [facility] [address]    RE: Michael Seals       Dear Colleague,    I had the pleasure of seeing Michael Seals in the [facility].    HISTORY OF PRESENT ILLNESS:  No angina. Exercises regularly at club without problems.     Orders this Visit:  No orders of the defined types were placed in this encounter.    No orders of the defined types were placed in this encounter.    There are no discontinued medications.    Encounter Diagnoses   Name Primary?     Coronary artery disease involving native coronary artery of native heart without angina pectoris      Mixed hyperlipidemia      Hyperlipidemia with target LDL less than 70 Yes     Essential hypertension        CURRENT MEDICATIONS:  Current Outpatient Prescriptions   Medication Sig Dispense Refill     aspirin (SB LOW DOSE ASA EC) 81 MG EC tablet Take 81 mg by mouth daily.       atenolol (TENORMIN) 50 MG tablet Take 0.5 tablets (25 mg) by mouth daily 45 tablet 3     evolocumab (REPATHA) 140 MG/ML prefilled autoinjector Inject 1 mL (140 mg) Subcutaneous every 14 days 6 mL 3     lisinopril (PRINIVIL/ZESTRIL) 2.5 MG tablet Take 1 tablet (2.5 mg) by mouth daily 30 tablet 3     Multiple Vitamins-Minerals (MULTIVITAMIN PO) Take by mouth daily       nitroglycerin (NITROSTAT) 0.4 MG SL tablet Place 1 tablet (0.4 mg) under the tongue every 5 minutes as needed for chest pain 90 tablet 3     omeprazole-sodium bicarbonate (ZEGERID)  MG per capsule Take 1 capsule (40 mg) by mouth daily as needed 30 capsule 3     RABEprazole (ACIPHEX) 20 MG EC tablet Take 1 tablet (20 mg) by mouth daily 90 tablet 3     ACE NOT PRESCRIBED, INTENTIONAL, continuous prn Reported on 4/21/2017       STATIN NOT PRESCRIBED, INTENTIONAL, Please choose reason not prescribed, below         ALLERGIES     Allergies   Allergen Reactions     Atorvastatin      Crestor [Rosuvastatin]      Muscle aches     Livalo  "[Pitavastatin]      Muscle aches     Pravastatin      Simvastatin        PAST MEDICAL, SURGICAL, FAMILY, SOCIAL HISTORY:  History was reviewed and updated as needed, see medical record.    Review of Systems:  A 12-point review of systems was completed, see medical record for detailed review of systems information.    Physical Exam:  Vitals: /80  Pulse (!) 48  Ht 1.791 m (5' 10.5\")  Wt 94.3 kg (208 lb)  BMI 29.42 kg/m2    Constitutional:  cooperative, alert and oriented, well developed, well nourished, in no acute distress        Skin:  warm and dry to the touch, no apparent skin lesions or masses noted        Head:  normocephalic, no masses or lesions        Eyes:  pupils equal and round, conjunctivae and lids unremarkable, sclera white, no xanthalasma, EOMS intact, no nystagmus        ENT:  no pallor or cyanosis, dentition good        Neck:  carotid pulses are full and equal bilaterally, JVP normal, no carotid bruit        Chest:  normal breath sounds, clear to auscultation, normal A-P diameter, normal symmetry, normal respiratory excursion, no use of accessory muscles        Cardiac: regular rhythm, normal S1/S2, no S3 or S4, apical impulse not displaced, no murmurs, gallops or rubs                  Abdomen:  abdomen soft, non-tender, BS normoactive, no mass, no HSM, no bruits        Vascular:                                        Extremities and Back:  no deformities, clubbing, cyanosis, erythema observed        Neurological:  no gross motor deficits        ASSESSMENT:  Stable.        RECOMMENDATIONS: continue present meds  Follow up blood pressure      Recent Lab Results:  LIPID RESULTS:  Lab Results   Component Value Date    CHOL 184 08/27/2018    HDL 57 08/27/2018    LDL 87 08/27/2018    TRIG 198 (H) 08/27/2018    CHOLHDLRATIO 6.9 (H) 03/03/2015       LIVER ENZYME RESULTS:  Lab Results   Component Value Date    AST 26 08/27/2018    ALT 33 08/27/2018       CBC RESULTS:  Lab Results   Component Value " Date    WBC 5.3 08/27/2018    RBC 4.42 08/27/2018    HGB 14.6 08/27/2018    HCT 42.7 08/27/2018    MCV 97 08/27/2018    MCH 33.0 08/27/2018    MCHC 34.2 08/27/2018    RDW 12.5 08/27/2018     08/27/2018       BMP RESULTS:  Lab Results   Component Value Date     08/27/2018    POTASSIUM 4.7 08/27/2018    CHLORIDE 107 08/27/2018    CO2 26 08/27/2018    ANIONGAP 6 08/27/2018     (H) 08/27/2018    BUN 19 08/27/2018    CR 1.16 08/27/2018    GFRESTIMATED 61 08/27/2018    GFRESTBLACK 74 08/27/2018    JIMMY 9.0 08/27/2018        A1C RESULTS:  Lab Results   Component Value Date    A1C 5.2 08/18/2017       INR RESULTS:  Lab Results   Component Value Date    INR 1.00 03/06/2012       We greatly appreciate the opportunity to be involved in the care of your patient, Michael Seals.    Sincerely,  Del Betancourt MD      CC  Del Betancourt MD  [address]  [place] [address]                                                                       Thank you for allowing me to participate in the care of your patient.      Sincerely,     Del Betancourt MD     [facility]    cc:   Del Betancourt MD  [address]  [place] [address]

## 2018-12-06 NOTE — PROGRESS NOTES
HISTORY OF PRESENT ILLNESS:  No angina. Exercises regularly at club without problems.     Orders this Visit:  No orders of the defined types were placed in this encounter.    No orders of the defined types were placed in this encounter.    There are no discontinued medications.    Encounter Diagnoses   Name Primary?     Coronary artery disease involving native coronary artery of native heart without angina pectoris      Mixed hyperlipidemia      Hyperlipidemia with target LDL less than 70 Yes     Essential hypertension        CURRENT MEDICATIONS:  Current Outpatient Prescriptions   Medication Sig Dispense Refill     aspirin (SB LOW DOSE ASA EC) 81 MG EC tablet Take 81 mg by mouth daily.       atenolol (TENORMIN) 50 MG tablet Take 0.5 tablets (25 mg) by mouth daily 45 tablet 3     evolocumab (REPATHA) 140 MG/ML prefilled autoinjector Inject 1 mL (140 mg) Subcutaneous every 14 days 6 mL 3     lisinopril (PRINIVIL/ZESTRIL) 2.5 MG tablet Take 1 tablet (2.5 mg) by mouth daily 30 tablet 3     Multiple Vitamins-Minerals (MULTIVITAMIN PO) Take by mouth daily       nitroglycerin (NITROSTAT) 0.4 MG SL tablet Place 1 tablet (0.4 mg) under the tongue every 5 minutes as needed for chest pain 90 tablet 3     omeprazole-sodium bicarbonate (ZEGERID)  MG per capsule Take 1 capsule (40 mg) by mouth daily as needed 30 capsule 3     RABEprazole (ACIPHEX) 20 MG EC tablet Take 1 tablet (20 mg) by mouth daily 90 tablet 3     ACE NOT PRESCRIBED, INTENTIONAL, continuous prn Reported on 4/21/2017       STATIN NOT PRESCRIBED, INTENTIONAL, Please choose reason not prescribed, below         ALLERGIES     Allergies   Allergen Reactions     Atorvastatin      Crestor [Rosuvastatin]      Muscle aches     Livalo [Pitavastatin]      Muscle aches     Pravastatin      Simvastatin        PAST MEDICAL, SURGICAL, FAMILY, SOCIAL HISTORY:  History was reviewed and updated as needed, see medical record.    Review of Systems:  A 12-point review of  "systems was completed, see medical record for detailed review of systems information.    Physical Exam:  Vitals: /80  Pulse (!) 48  Ht 1.791 m (5' 10.5\")  Wt 94.3 kg (208 lb)  BMI 29.42 kg/m2    Constitutional:  cooperative, alert and oriented, well developed, well nourished, in no acute distress        Skin:  warm and dry to the touch, no apparent skin lesions or masses noted        Head:  normocephalic, no masses or lesions        Eyes:  pupils equal and round, conjunctivae and lids unremarkable, sclera white, no xanthalasma, EOMS intact, no nystagmus        ENT:  no pallor or cyanosis, dentition good        Neck:  carotid pulses are full and equal bilaterally, JVP normal, no carotid bruit        Chest:  normal breath sounds, clear to auscultation, normal A-P diameter, normal symmetry, normal respiratory excursion, no use of accessory muscles        Cardiac: regular rhythm, normal S1/S2, no S3 or S4, apical impulse not displaced, no murmurs, gallops or rubs                  Abdomen:  abdomen soft, non-tender, BS normoactive, no mass, no HSM, no bruits        Vascular:                                        Extremities and Back:  no deformities, clubbing, cyanosis, erythema observed        Neurological:  no gross motor deficits        ASSESSMENT:  Stable.        RECOMMENDATIONS: continue present meds  Follow up blood pressure      Recent Lab Results:  LIPID RESULTS:  Lab Results   Component Value Date    CHOL 184 08/27/2018    HDL 57 08/27/2018    LDL 87 08/27/2018    TRIG 198 (H) 08/27/2018    CHOLHDLRATIO 6.9 (H) 03/03/2015       LIVER ENZYME RESULTS:  Lab Results   Component Value Date    AST 26 08/27/2018    ALT 33 08/27/2018       CBC RESULTS:  Lab Results   Component Value Date    WBC 5.3 08/27/2018    RBC 4.42 08/27/2018    HGB 14.6 08/27/2018    HCT 42.7 08/27/2018    MCV 97 08/27/2018    MCH 33.0 08/27/2018    MCHC 34.2 08/27/2018    RDW 12.5 08/27/2018     08/27/2018       BMP " RESULTS:  Lab Results   Component Value Date     08/27/2018    POTASSIUM 4.7 08/27/2018    CHLORIDE 107 08/27/2018    CO2 26 08/27/2018    ANIONGAP 6 08/27/2018     (H) 08/27/2018    BUN 19 08/27/2018    CR 1.16 08/27/2018    GFRESTIMATED 61 08/27/2018    GFRESTBLACK 74 08/27/2018    JIMMY 9.0 08/27/2018        A1C RESULTS:  Lab Results   Component Value Date    A1C 5.2 08/18/2017       INR RESULTS:  Lab Results   Component Value Date    INR 1.00 03/06/2012       We greatly appreciate the opportunity to be involved in the care of your patient, Michael Seals.    Sincerely,  Del Betancourt MD        Del Betancourt MD  [address]  [place] [address]

## 2018-12-06 NOTE — PROGRESS NOTES
Service Date: 12/06/2018      HISTORY OF PRESENT ILLNESS:  Michael Seals, a 73-year-old man with coronary artery disease, hypertension and hypercholesterolemia, was seen today at your request for followup.      Since I last saw him, the patient has remained free of chest, arm, neck, jaw or back discomfort with exertion.  He is tolerating PCSK9 inhibitors  with excellent lowering of  LDL cholesterol levels.  He exercises at [employer] on a regular basis without limit  and tries to follow a Mediterranean-style diet.     PAST MEDICAL HISTORY:   1.  Hypercholesterolemia - intolerant of all statin drugs.  Currently on PCSK9 inhibitor with excellent response.  Most recent LDL cholesterol of 87.   2.  Hypertension.   3.  Coronary artery disease:   a.  Status post stent implantation in proximal right coronary 2012 for new onset angina.   b.  Repeat angiography 2015 showing continued patency at stent site with moderate narrowing in the LAD and circumflex.      PHYSICAL EXAMINATION:   GENERAL:  Exam today demonstrates a very pleasant and cooperative 73-year-old man.   VITAL SIGNS:  His blood pressure is 136/80, his heart rate was 48 and regular.  His height is 1.8 meters, his weight is 94.3 kg.  His BMI is 29.5.   LUNGS:  Clear to percussion and auscultation.   CARDIOVASCULAR:  Shows a normal S1 with a normal S2.  There is no S3.  There is no murmur, rub or click.      LABORATORY STUDIES:  The patient's most recent LDL cholesterol in 008/2018 was 87.  His most recent HDL is 57.      ASSESSMENT:  Mr. Seals is asymptomatic on guideline-directed medical therapy with optimal LDL cholesterol levels and acceptable systolic blood pressure.  I have asked the patient to contact us with any recurrent angina.      RECOMMENDATIONS:   1.  Mediterranean style weight loss diet.   2.  Regular exercise.   3.  Continue present medical therapy.   4.  Followup visit with me in about 1 year.  The patient will continue to get his LDL  cholesterol levels checked with his primary care doctor on a regular basis.      We greatly appreciate the opportunity to see your patient, Mr. Niurka Seals.      cc:   [place] [address]         NARINDER SEALS MD             D: 2018   T: 2018   MT: BECK      Name:     NIURKA SEALS   MRN:      [id number]        Account:      [id number]   :      1945           Service Date: 2018      Document: [id number]

## 2018-12-06 NOTE — MR AVS SNAPSHOT
"              After Visit Summary   12/6/2018    Michael Seals    MRN: [id number]           Patient Information     Date Of Birth          1945        Visit Information        Provider Department      12/6/2018 12:45 PM Del Betancourt MD [facility]        Today's Diagnoses     Hyperlipidemia with target LDL less than 70    -  1    Coronary artery disease involving native coronary artery of native heart without angina pectoris        Mixed hyperlipidemia        Essential hypertension           Follow-ups after your visit        Additional Services     Follow-Up with Cardiologist                 Future tests that were ordered for you today     Open Future Orders        Priority Expected Expires Ordered    Follow-Up with Cardiologist Routine 12/6/2019 12/7/2019 12/6/2018            Who to contact     If you have questions or need follow up information about today's clinic visit or your schedule please contact [facility] directly at [phone].  Normal or non-critical lab and imaging results will be communicated to you by MyChart, letter or phone within 4 business days after the clinic has received the results. If you do not hear from us within 7 days, please contact the clinic through MyChart or phone. If you have a critical or abnormal lab result, we will notify you by phone as soon as possible.  Submit refill requests through [employer] or call your pharmacy and they will forward the refill request to us. Please allow 3 business days for your refill to be completed.          Additional Information About Your Visit        Care EveryWhere ID     This is your Care EveryWhere ID. This could be used by other organizations to access your [place] medical records  [id number]        Your Vitals Were     Pulse Height BMI (Body Mass Index)             48 1.791 m (5' 10.5\") 29.42 kg/m2          Blood Pressure from Last 3 Encounters: "   12/06/18 136/80   08/27/18 138/81   08/18/17 134/80    Weight from Last 3 Encounters:   12/06/18 94.3 kg (208 lb)   08/27/18 92.5 kg (203 lb 14.4 oz)   08/18/17 93.4 kg (206 lb)              We Performed the Following     Follow-Up with Cardiologist        Primary Care Provider Office Phone # Fax #    Josef Doll [phone] [address] [facility] [address]  [facility] [address]        Equal Access to Services     [facility]: Hadii aad ku hadasho Soomaali, waaxda luqadaha, qaybta kaalmada adeegyada, arden vides . So [facility] [phone].    ATENCIÓN: Si habla español, tiene a banda disposición servicios gratuitos de asistencia lingüística. [facility] [phone].    We comply with applicable federal civil rights laws and [place] laws. We do not discriminate on the basis of race, color, national origin, age, disability, sex, sexual orientation, or gender identity.            Thank you!     Thank you for choosing [facility]  for your care. Our goal is always to provide you with excellent care. Hearing back from our patients is one way we can continue to improve our services. Please take a few minutes to complete the written survey that you may receive in the mail after your visit with us. Thank you!             Your Updated Medication List - Protect others around you: Learn how to safely use, store and throw away your medicines at www.disposemymeds.org.          This list is accurate as of 12/6/18  1:03 PM.  Always use your most recent med list.                   Brand Name Dispense Instructions for use Diagnosis    ACE NOT PRESCRIBED (INTENTIONAL)      continuous prn Reported on 4/21/2017        atenolol 50 MG tablet    TENORMIN    45 tablet    Take 0.5 tablets (25 mg) by mouth daily    ASCVD (arteriosclerotic cardiovascular disease)       evolocumab 140 MG/ML prefilled autoinjector    REPATHA    6 mL    Inject 1 mL (140 mg)  Subcutaneous every 14 days    Hypercholesteremia       lisinopril 2.5 MG tablet    PRINIVIL/Zestril    30 tablet    Take 1 tablet (2.5 mg) by mouth daily    Essential hypertension       MULTIVITAMIN PO      Take by mouth daily        nitroGLYcerin 0.4 MG sublingual tablet    NITROSTAT    90 tablet    Place 1 tablet (0.4 mg) under the tongue every 5 minutes as needed for chest pain    ASCVD (arteriosclerotic cardiovascular disease)       omeprazole-sodium bicarbonate  MG capsule    ZEGERID    30 capsule    Take 1 capsule (40 mg) by mouth daily as needed    Esophageal dysmotility       RABEprazole 20 MG EC tablet    ACIPHEX    90 tablet    Take 1 tablet (20 mg) by mouth daily    Gastroesophageal reflux disease with esophagitis       SB LOW DOSE ASA EC 81 MG EC tablet   Generic drug:  aspirin      Take 81 mg by mouth daily.        STATIN NOT PRESCRIBED    INTENTIONAL     Please choose reason not prescribed, below    Hypercholesteremia, Coronary artery disease involving native coronary artery of native heart without angina pectoris

## 2018-12-06 NOTE — LETTER
12/6/2018      Josef Doll MD, MD  [address] [facility] [address]  [facility] [address]      RE: Michael Seals       Dear Colleague,    I had the pleasure of seeing Michael Seals in the [facility].    Service Date: 12/06/2018      HISTORY OF PRESENT ILLNESS:  Michael Seals, a 73-year-old man with coronary artery disease, hypertension and hypercholesterolemia, was seen today at your request for followup.      Since I last saw him, the patient has remained free of chest, arm, neck, jaw or back discomfort with exertion.  He is tolerating PCSK9 inhibitors  with excellent lowering of  LDL cholesterol levels.  He exercises at [employer] on a regular basis without limit  and tries to follow a Mediterranean-style diet.     PAST MEDICAL HISTORY:   1.  Hypercholesterolemia - intolerant of all statin drugs.  Currently on PCSK9 inhibitor with excellent response.  Most recent LDL cholesterol of 87.   2.  Hypertension.   3.  Coronary artery disease:   a.  Status post stent implantation in proximal right coronary 2012 for new onset angina.   b.  Repeat angiography 2015 showing continued patency at stent site with moderate narrowing in the LAD and circumflex.      PHYSICAL EXAMINATION:   GENERAL:  Exam today demonstrates a very pleasant and cooperative 73-year-old man.   VITAL SIGNS:  His blood pressure is 136/80, his heart rate was 48 and regular.  His height is 1.8 meters, his weight is 94.3 kg.  His BMI is 29.5.   LUNGS:  Clear to percussion and auscultation.   CARDIOVASCULAR:  Shows a normal S1 with a normal S2.  There is no S3.  There is no murmur, rub or click.      LABORATORY STUDIES:  The patient's most recent LDL cholesterol in 008/2018 was 87.  His most recent HDL is 57.      ASSESSMENT:  Mr. Seals is asymptomatic on guideline-directed medical therapy with optimal LDL cholesterol levels and acceptable systolic blood pressure.  I have asked the patient to contact us with any  recurrent angina.      RECOMMENDATIONS:   1.  Mediterranean style weight loss diet.   2.  Regular exercise.   3.  Continue present medical therapy.   4.  Followup visit with me in about 1 year.  The patient will continue to get his LDL cholesterol levels checked with his primary care doctor on a regular basis.      We greatly appreciate the opportunity to see your patient, Mr. Niurka Seals.      cc:   [place] [address]         NARINDER SEALS MD             D: 2018   T: 2018   MT: BECK      Name:     NIURKA SEALS   MRN:      [id number]        Account:      [id number]   :      1945           Service Date: 2018      Document: [id number]           Outpatient Encounter Medications as of 2018   Medication Sig Dispense Refill     aspirin (SB LOW DOSE ASA EC) 81 MG EC tablet Take 81 mg by mouth daily.       atenolol (TENORMIN) 50 MG tablet Take 0.5 tablets (25 mg) by mouth daily 45 tablet 3     evolocumab (REPATHA) 140 MG/ML prefilled autoinjector Inject 1 mL (140 mg) Subcutaneous every 14 days 6 mL 3     lisinopril (PRINIVIL/ZESTRIL) 2.5 MG tablet Take 1 tablet (2.5 mg) by mouth daily 30 tablet 3     Multiple Vitamins-Minerals (MULTIVITAMIN PO) Take by mouth daily       nitroglycerin (NITROSTAT) 0.4 MG SL tablet Place 1 tablet (0.4 mg) under the tongue every 5 minutes as needed for chest pain 90 tablet 3     omeprazole-sodium bicarbonate (ZEGERID)  MG per capsule Take 1 capsule (40 mg) by mouth daily as needed 30 capsule 3     RABEprazole (ACIPHEX) 20 MG EC tablet Take 1 tablet (20 mg) by mouth daily 90 tablet 3     ACE NOT PRESCRIBED, INTENTIONAL, continuous prn Reported on 2017       STATIN NOT PRESCRIBED, INTENTIONAL, Please choose reason not prescribed, below       No facility-administered encounter medications on file as of 2018.        Again, thank you for allowing me to  participate in the care of your patient.      Sincerely,    Del Betancourt MD     [facility]

## 2019-02-14 ENCOUNTER — TELEPHONE (OUTPATIENT)
Dept: FAMILY MEDICINE | Facility: CLINIC | Age: 74
End: 2019-02-14

## 2019-02-14 DIAGNOSIS — E78.00 HYPERCHOLESTEREMIA: ICD-10-CM

## 2019-02-14 NOTE — TELEPHONE ENCOUNTER
TO PCP  Spoke with patient   States now he is completely on Medicare  Can no longer get a discount on Evolocumab cholesterol injection med as they only do discounts for commercial insurances     Patient cannot afford the $500 for this med     Also cannot take statins due to problems with muscle aches     Ph[facility]acy: [facility]    Pt is asking if PCP has any recommendations for an alternative lipid-lowering med he can take?     Please advise   Kendra ABDULLAHI RN

## 2019-02-14 NOTE — TELEPHONE ENCOUNTER
Reason for Call:  Other     Detailed comments: pt is no longer able to get the evolocumab (REPATHA) injections.  He would like to know if Dr Mcrae has an alternative       Phone Number Patient can be reached at: Home number on file [phone] (home)    Best Time: any    Can we leave a detailed message on this number? YES    Call taken on 2/14/2019 at 10:28 AM by Rahel Siegel

## 2019-02-15 ENCOUNTER — TELEPHONE (OUTPATIENT)
Dept: OTHER | Facility: CLINIC | Age: 74
End: 2019-02-15

## 2019-02-15 NOTE — TELEPHONE ENCOUNTER
Detailed VM (okay per below) left for patient notifying him of message/referral placed and also left the number for vascular medicine for patient on his VM.     Asked that he call back if any questions    Kendra ABDULLAHI RN

## 2019-02-15 NOTE — TELEPHONE ENCOUNTER
Pt referred to [facility] by Josef Doll MD for Hypercholesteremia.    Pt needs to be scheduled for consult with vascular medicine.  Will route to scheduling to coordinate an appointment at next available.     MO ParsonsN, RN

## 2019-02-15 NOTE — TELEPHONE ENCOUNTER
I placed order for vascular medicine  - they should be following up with him to schedule    Josef Doll MD

## 2019-02-26 ENCOUNTER — OFFICE VISIT (OUTPATIENT)
Dept: OTHER | Facility: CLINIC | Age: 74
End: 2019-02-26
Attending: INTERNAL MEDICINE
Payer: COMMERCIAL

## 2019-02-26 VITALS
HEIGHT: 70 IN | SYSTOLIC BLOOD PRESSURE: 132 MMHG | WEIGHT: 210 LBS | OXYGEN SATURATION: 98 % | HEART RATE: 57 BPM | RESPIRATION RATE: 16 BRPM | DIASTOLIC BLOOD PRESSURE: 78 MMHG | BODY MASS INDEX: 30.06 KG/M2

## 2019-02-26 DIAGNOSIS — E55.9 VITAMIN D DEFICIENCY: ICD-10-CM

## 2019-02-26 DIAGNOSIS — I25.10 CORONARY ARTERY DISEASE INVOLVING NATIVE CORONARY ARTERY OF NATIVE HEART WITHOUT ANGINA PECTORIS: ICD-10-CM

## 2019-02-26 DIAGNOSIS — Z78.9 STATIN INTOLERANCE: ICD-10-CM

## 2019-02-26 DIAGNOSIS — E78.49 FAMILIAL HYPERLIPIDEMIA: Primary | ICD-10-CM

## 2019-02-26 DIAGNOSIS — I10 BENIGN ESSENTIAL HYPERTENSION: ICD-10-CM

## 2019-02-26 DIAGNOSIS — Z82.49 FAMILY HISTORY OF PREMATURE CAD: ICD-10-CM

## 2019-02-26 PROCEDURE — 99205 OFFICE O/P NEW HI 60 MIN: CPT | Mod: ZP | Performed by: INTERNAL MEDICINE

## 2019-02-26 PROCEDURE — G0463 HOSPITAL OUTPT CLINIC VISIT: HCPCS

## 2019-02-26 ASSESSMENT — MIFFLIN-ST. JEOR: SCORE: 1693.8

## 2019-02-26 NOTE — PROGRESS NOTES
[facility], [place] VASCULAR [facility] INITIAL  HYPERLIPIDEMIA CONSULT ( NEW PATIENT VISIT)      PRIMARY HEALTH CARE PROVIDER:  Josef Doll MD      REFERRING HEALTH CARE PROVIDER; Josef Doll MD      REASON FOR CONSULT:   Management of  Familial heterozygous Hyperlipidemia with statin intolerence ( tried 5 statins and zetia in the past), family Hx of father early CAD age 52 and both  Parents Hx of stroke.      HPI: Michael Seals is a 73 year old very pleasant male with past medical history of coronary artery disease underwent drug-eluting stent to RCA in 2012, he also has a 70% mid LAD stenosis and circumflex 50% and recent EF 55-60%, hypertension, strong family history of early coronary disease father age 52 MI and also both parents with history of stroke, GERD, heterozygous familial hyperlipidemia with off treatment LDL was greater than 225 on many occasions and total cholesterol was greater than 300 on few occasions, intolerant to multiple statins he tried atorvastatin, simvastatin, pravastatin, rosuvastatin, Livalo and also Zetia.  All of them caused myalgias and he describes pain mostly in the right calf and left shoulder blade area.  He was prescribed and took Repatha for a while which helped significantly reducing LDL but unfortunately his insurance through his wife was  and now he is getting medications through Medicare and costing approximately $500-$600 per month.  He stopped Repatha few months ago.  Currently not on any lipid-lowering medications.  He is very active does 6 days a week regular exercise 30-40 minutes in the health club.  No chest pain, shortness of breath or palpitations    Reviewed cardiac studies, laboratory data in the epic      PAST MEDICAL HISTORY  Past Medical History:   Diagnosis Date     CAD (coronary artery disease)     3/2012 LILLIAN RCA, 2015 Heart cath - previously placed RCA stent patent, 70% mid LAD stenosis, 50% prox CFX, EF 55-60%      CKD (chronic kidney disease) stage 2, GFR 60-89 ml/min     baseline crt 1.20 - 1.60     Esophageal dysmotility      GERD (gastroesophageal reflux disease)      H pylori ulcer      Hip fracture (H) 01/2013    right side - closed fracture - no surgery     HTN (hypertension)      Hyperlipidaemia LDL goal <100      Moderate major depression (H)      S/P appendectomy      S/P hernia repair     left     S/P PTCA (percutaneous transluminal coronary angioplasty) 03/2012    LILLIAN RCA       CURRENT MEDICATIONS    Current Outpatient Medications on File Prior to Visit:  ACE NOT PRESCRIBED, INTENTIONAL, continuous prn Reported on 4/21/2017   aspirin (SB LOW DOSE ASA EC) 81 MG EC tablet Take 81 mg by mouth daily.   atenolol (TENORMIN) 50 MG tablet Take 0.5 tablets (25 mg) by mouth daily   lisinopril (PRINIVIL/ZESTRIL) 2.5 MG tablet Take 1 tablet (2.5 mg) by mouth daily   Multiple Vitamins-Minerals (MULTIVITAMIN PO) Take by mouth daily   nitroglycerin (NITROSTAT) 0.4 MG SL tablet Place 1 tablet (0.4 mg) under the tongue every 5 minutes as needed for chest pain   omeprazole-sodium bicarbonate (ZEGERID)  MG per capsule Take 1 capsule (40 mg) by mouth daily as needed   RABEprazole (ACIPHEX) 20 MG EC tablet Take 1 tablet (20 mg) by mouth daily   STATIN NOT PRESCRIBED, INTENTIONAL, Please choose reason not prescribed, below   evolocumab (REPATHA) 140 MG/ML prefilled autoinjector Inject 1 mL (140 mg) Subcutaneous every 14 days (Patient not taking: Reported on 2/26/2019)     No current facility-administered medications on file prior to visit.     PREVIOUSLY TRIED LIPID LOWERING  MEDICATIONS  Statin - Yes:   Simvastatin  Atorvastatin  Rosuvastatin  Pitavastatin  Pravastatin    INTOLERANCE TO LIPID MEDICATIONS  5 statins listed above  Yes: Zetia - Yes    PAST SURGICAL HISTORY:  Past Surgical History:   Procedure Laterality Date     APPENDECTOMY       CORONARY ANGIOGRAPHY ADULT ORDER  8/2015    previously placed RCA stent patent, 70%  mid LAD stenosis, 50% prox CFX, EF 55-60%     HEART CATH STENT COR W/WO PTCA  3/2012    LILLIAN to RCA      HERNIA REPAIR         ALLERGIES     Allergies   Allergen Reactions     Atorvastatin      Crestor [Rosuvastatin]      Muscle aches     Livalo [Pitavastatin]      Muscle aches     Pravastatin      Simvastatin      Zetia [Ezetimibe]      Myalgias         FAMILY HISTORY  Family History   Problem Relation Age of Onset     Cerebrovascular Disease Mother      Cerebrovascular Disease Father      C.A.D. Father      Myocardial Infarction Father      Parkinsonism Brother      Heart Failure Brother      Other - See Comments Brother          in [place]       CARDIOVASCULAR RISK FACTORS  1. Diabetes: No  2. Smoking: quit smoking some time ago.  3. HTN: fair control  4.Family history of Familial Hyperlipidemia Yes or Familial Hypertriglyceridemia Yes  5.Metabolic syndrome Yes  6. [place] Risk Calculator: NA secondary prevention  7. 2013 GUIDELINES AHA RISK: >7.5 ( NA known CAD)  8. Age: 73 year old  9. Postmenopausal N/A  10.Prediabetes No  11. BMI: Body mass index is 30.13 kg/m .  12.SEX: male  13. CKD: No    SOCIAL HISTORY  Social History     Socioeconomic History     Marital status:      Spouse name: Not on file     Number of children: Not on file     Years of education: Not on file     Highest education level: Not on file   Occupational History     Not on file   Social Needs     Financial resource strain: Not on file     Food insecurity:     Worry: Not on file     Inability: Not on file     Transportation needs:     Medical: Not on file     Non-medical: Not on file   Tobacco Use     Smoking status: Former Smoker     Packs/day: 1.00     Years: 8.00     Pack years: 8.00     Types: Cigarettes     Last attempt to quit: 1970     Years since quittin.1     Smokeless tobacco: Never Used   Substance and Sexual Activity     Alcohol use: Yes     Alcohol/week: 6.0 oz     Types: 10 Standard drinks or equivalent per  week     Comment: 1-2 glasses of wine per evening with dinner     Drug use: No     Sexual activity: Yes     Partners: Female   Lifestyle     Physical activity:     Days per week: Not on file     Minutes per session: Not on file     Stress: Not on file   Relationships     Social connections:     Talks on phone: Not on file     Gets together: Not on file     Attends Protestant service: Not on file     Active member of club or organization: Not on file     Attends meetings of clubs or organizations: Not on file     Relationship status: Not on file     Intimate partner violence:     Fear of current or ex partner: Not on file     Emotionally abused: Not on file     Physically abused: Not on file     Forced sexual activity: Not on file   Other Topics Concern      Service Not Asked     Blood Transfusions Not Asked     Caffeine Concern Yes     Comment: 3-4 cups coffee per day     Occupational Exposure Not Asked     Hobby Hazards Not Asked     Sleep Concern Yes     Comment: off and on     Stress Concern Not Asked     Weight Concern Yes     Special Diet No     Back Care Not Asked     Exercise Yes     Comment: walking 5 days week, 2.5 miles, biking occasionally     Bike Helmet Not Asked     Seat Belt Not Asked     Self-Exams Not Asked     Parent/sibling w/ CABG, MI or angioplasty before 65F 55M? Not Asked   Social History Narrative     Not on file       ROS:   General: No change in weight, sleep or appetite.  Normal energy.  No fever or chills  Eyes: Negative for vision changes or eye problems  ENT: No problems with ears, nose or throat.  No difficulty swallowing.  Resp: No coughing, wheezing or shortness of breath  CV: No chest pains or palpitations  GI: No nausea, vomiting,  heartburn, abdominal pain, diarrhea, constipation or change in bowel habits  : No urinary frequency or dysuria, bladder or kidney problems  Musculoskeletal: No significant muscle or joint pains  Neurologic: No headaches, numbness, tingling,  "weakness, problems with balance or coordination  Psychiatric: No problems with anxiety, depression or mental health  Heme/immune/allergy: No history of bleeding or clotting problems or anemia.  No allergies or immune system problems  Endocrine: No history of thyroid disease, diabetes or other endocrine disorders  Skin: No rashes,worrisome lesions or skin problems  Vascular:  No claudication, lifestyle limiting or otherwise; no ischemic rest pain; no non-healing ulcers. No weakness, No loss of sensation      BASELINE MYALGIAS    None now off of all lipid meds, (previous statin related myalgias are severe right calf pain and left shoulder blade pain)    EXAM:  /78 (BP Location: Right arm, Patient Position: Chair, Cuff Size: Adult Regular)   Pulse 57   Resp 16   Ht 5' 10\" (1.778 m)   Wt 210 lb (95.3 kg)   SpO2 98%   BMI 30.13 kg/m    In general, the patient is a pleasant male in no apparent distress.    HEENT: NC/AT.  PERRLA.  EOMI.  Sclerae white, not injected.  Nares clear.  Pharynx without erythema or exudate.  Dentition intact.    Neck: No adenopathy.  No thyromegaly. Carotids +2/2 bilaterally without bruits.  No jugular venous distension.   Heart: RRR. Normal S1, S2 splits physiologically. No murmur, rub, click, or gallop. The PMI is in the 5th ICS in the midclavicular line. There is no heave.    Lungs: CTA.  No ronchi, wheezes, rales.  No dullness to percussion.   Abdomen: Soft, nontender, nondistended. No organomegaly. No AAA.  No bruits.   Extremities: No clubbing, cyanosis, or edema.  No wounds.     No Xanthelasma, but  arcus senilis noted. No Tendon Xanthomas, No Eruptive Xanthomas, No palmar crease abnormalities        Labs:  LIPID RESULTS:  Lab Results   Component Value Date    CHOL 184 08/27/2018    CHOL 168 12/19/2017    CHOL 341 (H) 08/18/2017    HDL 57 08/27/2018    HDL 70 12/19/2017    HDL 53 08/18/2017    LDL 87 08/27/2018    LDL 70 12/19/2017     (H) 08/18/2017    TRIG 198 (H) " 08/27/2018    TRIG 139 12/19/2017    TRIG 281 (H) 08/18/2017    CHOLHDLRATIO 6.9 (H) 03/03/2015    CHOLHDLRATIO 4.6 07/01/2013       ADVANCED LIPID TESTS: No    LIVER ENZYME RESULTS:  Lab Results   Component Value Date    AST 26 08/27/2018    ALT 33 08/27/2018       CBC RESULTS:  Lab Results   Component Value Date    WBC 5.3 08/27/2018    RBC 4.42 08/27/2018    HGB 14.6 08/27/2018    HCT 42.7 08/27/2018    MCV 97 08/27/2018    MCH 33.0 08/27/2018    MCHC 34.2 08/27/2018    RDW 12.5 08/27/2018     08/27/2018       BMP RESULTS:  Lab Results   Component Value Date     08/27/2018    POTASSIUM 4.7 08/27/2018    CHLORIDE 107 08/27/2018    CO2 26 08/27/2018    ANIONGAP 6 08/27/2018     (H) 08/27/2018    BUN 19 08/27/2018    CR 1.16 08/27/2018    GFRESTIMATED 61 08/27/2018    GFRESTBLACK 74 08/27/2018    JIMMY 9.0 08/27/2018        A1C RESULTS:  Lab Results   Component Value Date    A1C 5.2 08/18/2017           CARDIAC STUDIES: Yes    ECHO nuc study and cath etc    Reviewed all recent coronary studies, echo etc.      Assessment and Plan:     1. Familial hyperlipidemia ( Heterozygous FH with baseline untreated LDL > 225 on many occasions , TC was 300 range)    This is a very pleasant male with strong family history of early CAD, intolerance to multiple statins and Zetia, known history of coronary artery disease 2-3 vessel disease status post stent placement in RCA, hypertension recently tried PC SK 9 inhibitor Repatha with successful and significant improvement in LDL.  He tolerated medication without any problems.  Unfortunately insurance did not cover and he stopped the medication.  Last lipid profile was in August 2018 as delineated above    I will repeat fasting lipids, lipoprotein A, APB, vitamin D, CPK levels, thyroid function tests etc. this week  Initiate PC SK 9 inhibitor and possibly fluvastatin along with vitamin D and also coenzyme Q 10 supplementation  He will not be at goal with low potent  statin (baseline total cholesterol more than 300 and LDL more than 225 in the past)   He will  Need PCSK 9 inhibitor to acheive goal.  Educated on diet, written instructions and sheet given    Return to clinic in 2 weeks    - Lipid panel reflex to direct LDL Fasting; Future  - Lipoprotein A; Future    2. Statin intolerance    - CK total; Future    3. Coronary artery disease involving native coronary artery of native heart without angina pectoris, 2 v disease hx of RCA stent and modertae LAD lesion)      Asymptomatic managed by cardiologist Dr. Betancourt continue the same.  He is on appropriate medications      - Lipoprotein A; Future  - CRP cardiac risk; Future  - Comprehensive metabolic panel; Future    4. Benign essential hypertension  Well-controlled with lisinopril, beta-blocker continue the same    - TSH with free T4 reflex; Future  - Comprehensive metabolic panel; Future    5. Family history of premature CAD, father age 52 MI   As delineated above, aggressively control risk factors.  Check APO  B level and lipoprotein a level  - Lipoprotein A; Future    6. Vitamin D deficiency  He will benefit with supplementation decide dosing after lab test results  - Vitamin D Deficiency; Future    This note was dictated by utilizing dragon software    Thank you for the consultation  Keep you posted    Copy of this dictation to primary care physician Dr. Banda, primary cardiologist Dr. Betancourt    Total patient care time spent today more than 60 minutes face-to-face, more than 50% of the time spent counseling of the above-mentioned multiple issues. He is new to this clinic reviewed imaging studies, laboratory data in the epic and updated chart.    Micaela Greenwood MD, BARBARA, [facility]  Vascular Medicine   Clinical Lipidologist

## 2019-02-26 NOTE — PATIENT INSTRUCTIONS
Go for fasting labs this week    See me in 10-14 days will discuss options of treatment.    Eat nuts ( walnuts, almonds ) , soy products, pomegranate, benecal or take control take 1-2 tsp a day  ( grocery butter section) , oat meal,

## 2019-02-26 NOTE — PROGRESS NOTES
"Michael Seals is a 73 year old male who presents for:  Chief Complaint   Patient presents with     RECHECK     New patient-reffered to [facility] for hypercholesteremina        Vitals:    Vitals:    02/26/19 1343 02/26/19 1345   BP: 123/77 132/78   BP Location: Left arm Right arm   Patient Position: Chair Chair   Cuff Size: Adult Regular Adult Regular   Pulse: 57    Resp: 16    SpO2: 98%    Weight: 210 lb (95.3 kg)    Height: 5' 10\" (1.778 m)        BMI:  Estimated body mass index is 30.13 kg/m  as calculated from the following:    Height as of this encounter: 5' 10\" (1.778 m).    Weight as of this encounter: 210 lb (95.3 kg).    Pain Score:  Data Unavailable        Tianna Kendall    "

## 2019-03-01 DIAGNOSIS — Z78.9 STATIN INTOLERANCE: ICD-10-CM

## 2019-03-01 DIAGNOSIS — Z82.49 FAMILY HISTORY OF PREMATURE CAD: ICD-10-CM

## 2019-03-01 DIAGNOSIS — E55.9 VITAMIN D DEFICIENCY: ICD-10-CM

## 2019-03-01 DIAGNOSIS — E78.49 FAMILIAL HYPERLIPIDEMIA: ICD-10-CM

## 2019-03-01 DIAGNOSIS — I10 BENIGN ESSENTIAL HYPERTENSION: ICD-10-CM

## 2019-03-01 DIAGNOSIS — I25.10 CORONARY ARTERY DISEASE INVOLVING NATIVE CORONARY ARTERY OF NATIVE HEART WITHOUT ANGINA PECTORIS: ICD-10-CM

## 2019-03-01 LAB
ALBUMIN SERPL-MCNC: 3.7 G/DL (ref 3.4–5)
ALP SERPL-CCNC: 90 U/L (ref 40–150)
ALT SERPL W P-5'-P-CCNC: 36 U/L (ref 0–70)
ANION GAP SERPL CALCULATED.3IONS-SCNC: 8 MMOL/L (ref 3–14)
AST SERPL W P-5'-P-CCNC: 24 U/L (ref 0–45)
BILIRUB SERPL-MCNC: 0.5 MG/DL (ref 0.2–1.3)
BUN SERPL-MCNC: 23 MG/DL (ref 7–30)
CALCIUM SERPL-MCNC: 9.3 MG/DL (ref 8.5–10.1)
CHLORIDE SERPL-SCNC: 106 MMOL/L (ref 94–109)
CHOLEST SERPL-MCNC: 358 MG/DL
CK SERPL-CCNC: 79 U/L (ref 30–300)
CO2 SERPL-SCNC: 24 MMOL/L (ref 20–32)
CREAT SERPL-MCNC: 1.37 MG/DL (ref 0.66–1.25)
DEPRECATED CALCIDIOL+CALCIFEROL SERPL-MC: 26 UG/L (ref 20–75)
GFR SERPL CREATININE-BSD FRML MDRD: 50 ML/MIN/{1.73_M2}
GLUCOSE SERPL-MCNC: 114 MG/DL (ref 70–99)
HDLC SERPL-MCNC: 52 MG/DL
LDLC SERPL CALC-MCNC: 255 MG/DL
NONHDLC SERPL-MCNC: 306 MG/DL
POTASSIUM SERPL-SCNC: 4.6 MMOL/L (ref 3.4–5.3)
PROT SERPL-MCNC: 7.7 G/DL (ref 6.8–8.8)
SODIUM SERPL-SCNC: 138 MMOL/L (ref 133–144)
T4 FREE SERPL-MCNC: 0.8 NG/DL (ref 0.76–1.46)
TRIGL SERPL-MCNC: 257 MG/DL
TSH SERPL DL<=0.005 MIU/L-ACNC: 9.11 MU/L (ref 0.4–4)

## 2019-03-01 PROCEDURE — 82550 ASSAY OF CK (CPK): CPT | Performed by: INTERNAL MEDICINE

## 2019-03-01 PROCEDURE — 36415 COLL VENOUS BLD VENIPUNCTURE: CPT | Performed by: INTERNAL MEDICINE

## 2019-03-01 PROCEDURE — 86141 C-REACTIVE PROTEIN HS: CPT | Performed by: INTERNAL MEDICINE

## 2019-03-01 PROCEDURE — 99000 SPECIMEN HANDLING OFFICE-LAB: CPT | Performed by: INTERNAL MEDICINE

## 2019-03-01 PROCEDURE — 80061 LIPID PANEL: CPT | Performed by: INTERNAL MEDICINE

## 2019-03-01 PROCEDURE — 80053 COMPREHEN METABOLIC PANEL: CPT | Performed by: INTERNAL MEDICINE

## 2019-03-01 PROCEDURE — 83695 ASSAY OF LIPOPROTEIN(A): CPT | Performed by: INTERNAL MEDICINE

## 2019-03-01 PROCEDURE — 84443 ASSAY THYROID STIM HORMONE: CPT | Performed by: INTERNAL MEDICINE

## 2019-03-01 PROCEDURE — 82172 ASSAY OF APOLIPOPROTEIN: CPT | Mod: 90 | Performed by: INTERNAL MEDICINE

## 2019-03-01 PROCEDURE — 84439 ASSAY OF FREE THYROXINE: CPT | Performed by: INTERNAL MEDICINE

## 2019-03-01 PROCEDURE — 82306 VITAMIN D 25 HYDROXY: CPT | Performed by: INTERNAL MEDICINE

## 2019-03-03 LAB — APO B100 SERPL-MCNC: 194 MG/DL (ref 55–140)

## 2019-03-04 ENCOUNTER — TELEPHONE (OUTPATIENT)
Dept: OTHER | Facility: CLINIC | Age: 74
End: 2019-03-04

## 2019-03-04 DIAGNOSIS — R79.89 ABNORMAL SERUM THYROID STIMULATING HORMONE (TSH) LEVEL: Primary | ICD-10-CM

## 2019-03-04 LAB — LPA SERPL-MCNC: 26 MG/DL (ref 0–30)

## 2019-03-04 RX ORDER — LEVOTHYROXINE SODIUM 50 UG/1
50 TABLET ORAL DAILY
Qty: 30 TABLET | Refills: 3 | Status: SHIPPED | OUTPATIENT
Start: 2019-03-04 | End: 2019-07-15

## 2019-03-04 NOTE — TELEPHONE ENCOUNTER
From Dr Greenwood results note:  Will discuss all ther test results at his upcoming visit but start Levothyroxine 50 mcg daily in am on early stomach and do not mix with other meds. # 30 with 3 refills.     Patient called with the above and states understanding.  Pharm verified with patient, ordered as above  Neto Rees RN, BSN

## 2019-03-05 LAB — CRP SERPL HS-MCNC: 0.8 MG/L

## 2019-03-14 ENCOUNTER — OFFICE VISIT (OUTPATIENT)
Dept: OTHER | Facility: CLINIC | Age: 74
End: 2019-03-14
Attending: INTERNAL MEDICINE
Payer: COMMERCIAL

## 2019-03-14 VITALS
SYSTOLIC BLOOD PRESSURE: 147 MMHG | DIASTOLIC BLOOD PRESSURE: 84 MMHG | RESPIRATION RATE: 18 BRPM | OXYGEN SATURATION: 97 % | HEIGHT: 71 IN | BODY MASS INDEX: 29.4 KG/M2 | HEART RATE: 53 BPM | WEIGHT: 210 LBS

## 2019-03-14 DIAGNOSIS — E03.9 HYPOTHYROIDISM, UNSPECIFIED TYPE: ICD-10-CM

## 2019-03-14 DIAGNOSIS — I25.10 CORONARY ARTERY DISEASE INVOLVING NATIVE CORONARY ARTERY OF NATIVE HEART WITHOUT ANGINA PECTORIS: ICD-10-CM

## 2019-03-14 DIAGNOSIS — Z78.9 STATIN INTOLERANCE: ICD-10-CM

## 2019-03-14 DIAGNOSIS — Z82.49 FAMILY HISTORY OF PREMATURE CAD: ICD-10-CM

## 2019-03-14 DIAGNOSIS — E78.49 FAMILIAL HYPERLIPIDEMIA: Primary | ICD-10-CM

## 2019-03-14 DIAGNOSIS — I10 BENIGN ESSENTIAL HYPERTENSION: ICD-10-CM

## 2019-03-14 DIAGNOSIS — E55.9 VITAMIN D DEFICIENCY: ICD-10-CM

## 2019-03-14 PROCEDURE — G0463 HOSPITAL OUTPT CLINIC VISIT: HCPCS

## 2019-03-14 PROCEDURE — 99215 OFFICE O/P EST HI 40 MIN: CPT | Mod: ZP | Performed by: INTERNAL MEDICINE

## 2019-03-14 RX ORDER — PITAVASTATIN CALCIUM 2.09 MG/1
2 TABLET, FILM COATED ORAL DAILY
Qty: 30 TABLET | Refills: 3 | Status: SHIPPED | OUTPATIENT
Start: 2019-03-14 | End: 2019-05-21

## 2019-03-14 ASSESSMENT — MIFFLIN-ST. JEOR: SCORE: 1709.68

## 2019-03-14 NOTE — PATIENT INSTRUCTIONS
Michael to follow up with Primary Care provider regarding elevated blood pressure.    1. Increase lisinopril 5 mg daily     2. Take OTC coenzyme Q 10 , 200 mg daily with food    3. Vitamin D 3 , 2000 units a day OTC    4.start Livalo 2 mg daily starting next week    5. Take Repatha every 2 weeks ( same as before)     See me in 6 weeks, Fasting lipids before visit.

## 2019-03-14 NOTE — PROGRESS NOTES
[facility] FOLLOW UP VISIT    PRIMARY HEALTH CARE PROVIDER:  Josef Doll MD        REASON FOR VISIT : He was recently seen in the clinic for management of  Familial heterozygous Hyperlipidemia with statin intolerence ( tried 5 statins and zetia in the past), family Hx of father early CAD age 52 and both  Parents Hx of stroke.  He responded well with pcsk 9 inhibitors but his insurance is not covered and currently unable to afford.  He underwent extensive lab work review of the test results and initiation of lipid-lowering medications.      HPI: Michael Seals is a 73 year old very pleasant male with past medical history of coronary artery disease underwent drug-eluting stent to RCA in 2012, he also has a 70% mid LAD stenosis and circumflex 50% and recent EF 55-60%, hypertension, strong family history of early coronary disease father age 52 MI and also both parents with history of stroke, GERD, heterozygous familial hyperlipidemia with off treatment LDL was greater than 225 on many occasions and total cholesterol was greater than 300 on few occasions, intolerant to multiple statins he tried atorvastatin, simvastatin, pravastatin, rosuvastatin, Livalo and also Zetia.  All of them caused myalgias and he describes pain mostly in the right calf and left shoulder blade area.  He was prescribed and took Repatha for a while which helped significantly reducing LDL but unfortunately his insurance through his wife was  and now he is getting medications through Medicare and costing approximately $500-$600 per month.  He stopped Repatha few months ago.  Currently not on any lipid-lowering medications.  He is very active does 6 days a week regular exercise 30-40 minutes in the health club.  No chest pain, shortness of breath or palpitations    Reviewed cardiac studies, laboratory data in the epic    On 2019  TSH was elevated at 9.1  Total cholesterol was 358,  triglycerides 257, , non-, HDL 52 (these labs were done off of lipid-lowering medications)  Lipoprotein a was normal  Vitamin D low normal  CPK was normal  CRP 0.8 normal  Apolipoprotein B 194 which is very high    Last week I have initiated levothyroxine 50 mcg daily tolerating without any problems, and given therapeutic lifestyle modifications and he has been following.          PAST MEDICAL HISTORY  Past Medical History:   Diagnosis Date     CAD (coronary artery disease)     3/2012 LILLIAN RCA, 8/2015 Heart cath - previously placed RCA stent patent, 70% mid LAD stenosis, 50% prox CFX, EF 55-60%     CKD (chronic kidney disease) stage 2, GFR 60-89 ml/min     baseline crt 1.20 - 1.60     Esophageal dysmotility      GERD (gastroesophageal reflux disease)      H pylori ulcer      Hip fracture (H) 01/2013    right side - closed fracture - no surgery     HTN (hypertension)      Hyperlipidaemia LDL goal <100      Moderate major depression (H)      S/P appendectomy      S/P hernia repair     left     S/P PTCA (percutaneous transluminal coronary angioplasty) 03/2012    LILLIAN RCA       CURRENT MEDICATIONS    Current Outpatient Medications on File Prior to Visit:  ACE NOT PRESCRIBED, INTENTIONAL, continuous prn Reported on 4/21/2017   aspirin (SB LOW DOSE ASA EC) 81 MG EC tablet Take 81 mg by mouth daily.   atenolol (TENORMIN) 50 MG tablet Take 0.5 tablets (25 mg) by mouth daily   levothyroxine (SYNTHROID/LEVOTHROID) 50 MCG tablet Take 1 tablet (50 mcg) by mouth daily   lisinopril (PRINIVIL/ZESTRIL) 2.5 MG tablet Take 1 tablet (2.5 mg) by mouth daily   Multiple Vitamins-Minerals (MULTIVITAMIN PO) Take by mouth daily   nitroglycerin (NITROSTAT) 0.4 MG SL tablet Place 1 tablet (0.4 mg) under the tongue every 5 minutes as needed for chest pain   omeprazole-sodium bicarbonate (ZEGERID)  MG per capsule Take 1 capsule (40 mg) by mouth daily as needed   RABEprazole (ACIPHEX) 20 MG EC tablet Take 1 tablet (20 mg) by  mouth daily   STATIN NOT PRESCRIBED, INTENTIONAL, Please choose reason not prescribed, below     No current facility-administered medications on file prior to visit.     PREVIOUSLY TRIED LIPID LOWERING  MEDICATIONS  Statin - Yes:   Simvastatin  Atorvastatin  Rosuvastatin  Pitavastatin  Pravastatin    INTOLERANCE TO LIPID MEDICATIONS  5 statins listed above  Yes: Zetia - Yes    PAST SURGICAL HISTORY:  Past Surgical History:   Procedure Laterality Date     APPENDECTOMY       CORONARY ANGIOGRAPHY ADULT ORDER  2015    previously placed RCA stent patent, 70% mid LAD stenosis, 50% prox CFX, EF 55-60%     HEART CATH STENT COR W/WO PTCA  3/2012    LILLIAN to RCA      HERNIA REPAIR         ALLERGIES     Allergies   Allergen Reactions     Atorvastatin      Crestor [Rosuvastatin]      Muscle aches     Livalo [Pitavastatin]      Muscle aches     Pravastatin      Simvastatin      Zetia [Ezetimibe]      Myalgias         FAMILY HISTORY  Family History   Problem Relation Age of Onset     Cerebrovascular Disease Mother      Cerebrovascular Disease Father      C.A.D. Father      Myocardial Infarction Father      Parkinsonism Brother      Heart Failure Brother      Other - See Comments Brother          in [place]       CARDIOVASCULAR RISK FACTORS  1. Diabetes: No  2. Smoking: quit smoking some time ago.  3. HTN: fair control  4.Family history of Familial Hyperlipidemia Yes or Familial Hypertriglyceridemia Yes  5.Metabolic syndrome Yes  6. Iselin Risk Calculator: NA secondary prevention  7. 2013 GUIDELINES AHA RISK: >7.5 ( NA known CAD)  8. Age: 73 year old  9. Postmenopausal N/A  10.Prediabetes No  11. BMI: Body mass index is 29.29 kg/m .  12.SEX: male  13. CKD: No    SOCIAL HISTORY  Social History     Socioeconomic History     Marital status:      Spouse name: Not on file     Number of children: Not on file     Years of education: Not on file     Highest education level: Not on file   Occupational History     Not on  file   Social Needs     Financial resource strain: Not on file     Food insecurity:     Worry: Not on file     Inability: Not on file     Transportation needs:     Medical: Not on file     Non-medical: Not on file   Tobacco Use     Smoking status: Former Smoker     Packs/day: 1.00     Years: 8.00     Pack years: 8.00     Types: Cigarettes     Last attempt to quit: 1970     Years since quittin.2     Smokeless tobacco: Never Used   Substance and Sexual Activity     Alcohol use: Yes     Alcohol/week: 6.0 oz     Types: 10 Standard drinks or equivalent per week     Comment: 1-2 glasses of wine per evening with dinner     Drug use: No     Sexual activity: Yes     Partners: Female   Lifestyle     Physical activity:     Days per week: Not on file     Minutes per session: Not on file     Stress: Not on file   Relationships     Social connections:     Talks on phone: Not on file     Gets together: Not on file     Attends Taoist service: Not on file     Active member of club or organization: Not on file     Attends meetings of clubs or organizations: Not on file     Relationship status: Not on file     Intimate partner violence:     Fear of current or ex partner: Not on file     Emotionally abused: Not on file     Physically abused: Not on file     Forced sexual activity: Not on file   Other Topics Concern      Service Not Asked     Blood Transfusions Not Asked     Caffeine Concern Yes     Comment: 3-4 cups coffee per day     Occupational Exposure Not Asked     Hobby Hazards Not Asked     Sleep Concern Yes     Comment: off and on     Stress Concern Not Asked     Weight Concern Yes     Special Diet No     Back Care Not Asked     Exercise Yes     Comment: walking 5 days week, 2.5 miles, biking occasionally     Bike Helmet Not Asked     Seat Belt Not Asked     Self-Exams Not Asked     Parent/sibling w/ CABG, MI or angioplasty before 65F 55M? Not Asked   Social History Narrative     Not on file       ROS:  "  General: No change in weight, sleep or appetite.  Normal energy.  No fever or chills  Eyes: Negative for vision changes or eye problems  ENT: No problems with ears, nose or throat.  No difficulty swallowing.  Resp: No coughing, wheezing or shortness of breath  CV: No chest pains or palpitations  GI: No nausea, vomiting,  heartburn, abdominal pain, diarrhea, constipation or change in bowel habits  : No urinary frequency or dysuria, bladder or kidney problems  Musculoskeletal: No significant muscle or joint pains  Neurologic: No headaches, numbness, tingling, weakness, problems with balance or coordination  Psychiatric: No problems with anxiety, depression or mental health  Heme/immune/allergy: No history of bleeding or clotting problems or anemia.  No allergies or immune system problems  Endocrine: No history of thyroid disease, diabetes or other endocrine disorders  Skin: No rashes,worrisome lesions or skin problems  Vascular:  No claudication, lifestyle limiting or otherwise; no ischemic rest pain; no non-healing ulcers. No weakness, No loss of sensation      BASELINE MYALGIAS    None now off of all lipid meds, (previous statin related myalgias are severe right calf pain and left shoulder blade pain)    EXAM:  /84 (BP Location: Right arm)   Pulse 53   Resp 18   Ht 1.803 m (5' 11\")   Wt 95.3 kg (210 lb)   SpO2 97%   BMI 29.29 kg/m    In general, the patient is a pleasant male in no apparent distress.    HEENT: NC/AT.  PERRLA.  EOMI.  Sclerae white, not injected.  Nares clear.  Pharynx without erythema or exudate.  Dentition intact.    Neck: No adenopathy.  No thyromegaly. Carotids +2/2 bilaterally without bruits.  No jugular venous distension.   Heart: RRR. Normal S1, S2 splits physiologically. No murmur, rub, click, or gallop. The PMI is in the 5th ICS in the midclavicular line. There is no heave.    Lungs: CTA.  No ronchi, wheezes, rales.  No dullness to percussion.   Abdomen: Soft, nontender, " nondistended. No organomegaly. No AAA.  No bruits.   Extremities: No clubbing, cyanosis, or edema.  No wounds.     No Xanthelasma, but  arcus senilis noted. No Tendon Xanthomas, No Eruptive Xanthomas, No palmar crease abnormalities        Labs:  LIPID RESULTS:  Lab Results   Component Value Date    CHOL 358 (H) 03/01/2019    CHOL 184 08/27/2018    CHOL 168 12/19/2017    HDL 52 03/01/2019    HDL 57 08/27/2018    HDL 70 12/19/2017     (H) 03/01/2019    LDL 87 08/27/2018    LDL 70 12/19/2017    TRIG 257 (H) 03/01/2019    TRIG 198 (H) 08/27/2018    TRIG 139 12/19/2017    CHOLHDLRATIO 6.9 (H) 03/03/2015    CHOLHDLRATIO 4.6 07/01/2013       ADVANCED LIPID TESTS: No    LIVER ENZYME RESULTS:  Lab Results   Component Value Date    AST 24 03/01/2019    ALT 36 03/01/2019       CBC RESULTS:  Lab Results   Component Value Date    WBC 5.3 08/27/2018    RBC 4.42 08/27/2018    HGB 14.6 08/27/2018    HCT 42.7 08/27/2018    MCV 97 08/27/2018    MCH 33.0 08/27/2018    MCHC 34.2 08/27/2018    RDW 12.5 08/27/2018     08/27/2018       BMP RESULTS:  Lab Results   Component Value Date     03/01/2019    POTASSIUM 4.6 03/01/2019    CHLORIDE 106 03/01/2019    CO2 24 03/01/2019    ANIONGAP 8 03/01/2019     (H) 03/01/2019    BUN 23 03/01/2019    CR 1.37 (H) 03/01/2019    GFRESTIMATED 50 (L) 03/01/2019    GFRESTBLACK 58 (L) 03/01/2019    JIMMY 9.3 03/01/2019        A1C RESULTS:  Lab Results   Component Value Date    A1C 5.2 08/18/2017           CARDIAC STUDIES: Yes    ECHO nuc study and cath etc    Reviewed all recent coronary studies, echo etc.      Assessment and Plan:     1. Familial hyperlipidemia ( Heterozygous FH with baseline untreated LDL > 250 on many occasions , TC was 358 , apolipoprotein B very high 194)    This is a very pleasant male with strong family history of early CAD, intolerance to multiple statins and Zetia, known history of coronary artery disease 2-3 vessel disease status post stent placement in  RCA, hypertension recently tried PC SK 9 inhibitor Repatha which was successful and significant improvement in LDL.  He tolerated medication without any problems.  Unfortunately insurance did not cover and he stopped the medication.  Currently not taking any lipid-lowering medications  Last lipid profile was in August 2018 as delineated above , I repeated labs on March 1, 2019 results as delineated above    Recent vitamin D low normal, CPK normal, lipoprotein a normal, CRP 0.8.    He needs aggressive management of lipids given known history of a coronary disease and family history of early CAD and he has a heterozygous FH, goal of LDL should be less than 70.    Only medication he tolerated for a while was livalo ( pitavastatin)  Given hypothyroidism recently initiated levothyroxine 50 mcg daily    At present my recommendations,    1.  Continue levothyroxine 50 mcg daily  2.  Restart pitava statin 2 mg daily for next 6 weeks and uptitrate if tolerates.  This alone will not achieve the goal.  3.  Continue therapeutic lifestyle modifications  4.  Initiate Repatha (will get samples from pharmaceutical company)  5.  Fasting lipid profile in 6 weeks.  6.  Coenzyme Q 10 200 mg daily  7.  Vitamin D 3-2000 units daily          2. Statin intolerance  Only statin he tolerated briefly was livalo, will start 2 mg daily and treat hypothyroidism, give coenzyme Q 10 and also vitamin D.      3. Coronary artery disease involving native coronary artery of native heart without angina pectoris, 2 v disease hx of RCA stent and modertae LAD lesion)      Asymptomatic managed by cardiologist Dr. Betancourt continue the same.  He is on appropriate medications        4. Benign essential hypertension  Well-controlled with lisinopril, beta-blocker continue the same      5. Family history of premature CAD, father age 52 MI   As delineated above, aggressively control risk factors.    6. Vitamin D deficiency  Low normal vitamin D at 24  He will  benefit with supplementation , start 2000 units daily      This note was dictated by utilizing dragon software    Thank you for the consultation   I will Keep you posted    Copy of this dictation to primary care physician Dr. Banda, primary cardiologist Dr. Betancourt    Total patient care time spent today more than 40 minutes face-to-face, more than 50% of the time spent counseling of the above-mentioned multiple issues.  Reviewed recent laboratory data with the patient    Micaela Greenwood MD, BARBARA, FSROLAN,LA  Vascular Medicine   Clinical Lipidologist

## 2019-03-14 NOTE — NURSING NOTE
"Michael Seals is a 73 year old male who presents for:  Chief Complaint   Patient presents with     RECHECK        Vitals:    Vitals:    03/14/19 1421   BP: 159/87   BP Location: Right arm   Pulse: 53   Resp: 18   SpO2: 97%   Weight: 210 lb (95.3 kg)   Height: 5' 11\" (1.803 m)       BMI:  Estimated body mass index is 29.29 kg/m  as calculated from the following:    Height as of this encounter: 5' 11\" (1.803 m).    Weight as of this encounter: 210 lb (95.3 kg).    Pain Score:  Data Unavailable        Winsome Rees      "

## 2019-03-26 ENCOUNTER — TELEPHONE (OUTPATIENT)
Dept: CARDIOLOGY | Facility: CLINIC | Age: 74
End: 2019-03-26

## 2019-04-01 ENCOUNTER — TELEPHONE (OUTPATIENT)
Dept: OTHER | Facility: CLINIC | Age: 74
End: 2019-04-01

## 2019-04-01 NOTE — TELEPHONE ENCOUNTER
Patient LM that samples have arrived.  Awaiting return call for  date and time.  Neto Rees, RN, BSN

## 2019-04-01 NOTE — TELEPHONE ENCOUNTER
Patient returned call and will  samples today.  4 boxes Repatha 140 mg Exp 3/2020, lot# [id number]  Neto Rees RN, BSN

## 2019-04-02 NOTE — TELEPHONE ENCOUNTER
RN spoke with patient. He received 2 month supply of Repatha samples through Dr. Greenwood and wanted to know if he should stop the Livalo. It was ordered by Dr. Greenwood. Pt to call Dr. Greenwood to discuss whether pt should stop Livalo.     Pt states he received the [employer] forms to complete. Once completed he will send them back to be faxed to the [employer].     Will send to Dr. Betancourt for any further recommendations.

## 2019-05-02 ENCOUNTER — TELEPHONE (OUTPATIENT)
Dept: CARDIOLOGY | Facility: CLINIC | Age: 74
End: 2019-05-02

## 2019-05-02 NOTE — TELEPHONE ENCOUNTER
RN called pt to discuss the [employer] [facility] request for more information about the [facility] coverage. Pt to call [employer] to provide the information.

## 2019-05-06 ENCOUNTER — TELEPHONE (OUTPATIENT)
Dept: OTHER | Facility: CLINIC | Age: 74
End: 2019-05-06

## 2019-05-06 NOTE — TELEPHONE ENCOUNTER
Left message for patient to schedule fasting lab visit for appointment 5/21/2019.   Kristi Chong MA

## 2019-05-09 NOTE — TELEPHONE ENCOUNTER
RN left a voicemail to inform pt he was approved by the [employer] to receive drug at no cost. RN provided the phone number to call to order Repatha. Pt to call with any questions or concerns.

## 2019-05-16 DIAGNOSIS — E78.49 FAMILIAL HYPERLIPIDEMIA: ICD-10-CM

## 2019-05-16 DIAGNOSIS — E03.9 HYPOTHYROIDISM, UNSPECIFIED TYPE: ICD-10-CM

## 2019-05-16 DIAGNOSIS — I25.10 CORONARY ARTERY DISEASE INVOLVING NATIVE CORONARY ARTERY OF NATIVE HEART WITHOUT ANGINA PECTORIS: ICD-10-CM

## 2019-05-16 LAB
CHOLEST SERPL-MCNC: 113 MG/DL
HDLC SERPL-MCNC: 55 MG/DL
LDLC SERPL CALC-MCNC: 32 MG/DL
NONHDLC SERPL-MCNC: 58 MG/DL
TRIGL SERPL-MCNC: 132 MG/DL
TSH SERPL DL<=0.005 MIU/L-ACNC: 3.01 MU/L (ref 0.4–4)

## 2019-05-16 PROCEDURE — 36415 COLL VENOUS BLD VENIPUNCTURE: CPT | Performed by: INTERNAL MEDICINE

## 2019-05-16 PROCEDURE — 80061 LIPID PANEL: CPT | Performed by: INTERNAL MEDICINE

## 2019-05-16 PROCEDURE — 84443 ASSAY THYROID STIM HORMONE: CPT | Performed by: INTERNAL MEDICINE

## 2019-05-21 ENCOUNTER — OFFICE VISIT (OUTPATIENT)
Dept: OTHER | Facility: CLINIC | Age: 74
End: 2019-05-21
Attending: INTERNAL MEDICINE
Payer: COMMERCIAL

## 2019-05-21 ENCOUNTER — TELEPHONE (OUTPATIENT)
Dept: OTHER | Facility: CLINIC | Age: 74
End: 2019-05-21

## 2019-05-21 VITALS
RESPIRATION RATE: 16 BRPM | HEART RATE: 62 BPM | DIASTOLIC BLOOD PRESSURE: 72 MMHG | OXYGEN SATURATION: 97 % | SYSTOLIC BLOOD PRESSURE: 122 MMHG | HEIGHT: 71 IN | WEIGHT: 210 LBS | BODY MASS INDEX: 29.4 KG/M2

## 2019-05-21 DIAGNOSIS — E55.9 VITAMIN D DEFICIENCY: ICD-10-CM

## 2019-05-21 DIAGNOSIS — E78.49 FAMILIAL HYPERLIPIDEMIA: Primary | ICD-10-CM

## 2019-05-21 DIAGNOSIS — E03.9 HYPOTHYROIDISM, UNSPECIFIED TYPE: ICD-10-CM

## 2019-05-21 DIAGNOSIS — Z82.49 FAMILY HISTORY OF PREMATURE CAD: ICD-10-CM

## 2019-05-21 DIAGNOSIS — I25.10 CORONARY ARTERY DISEASE INVOLVING NATIVE CORONARY ARTERY OF NATIVE HEART WITHOUT ANGINA PECTORIS: ICD-10-CM

## 2019-05-21 DIAGNOSIS — I10 ESSENTIAL HYPERTENSION: ICD-10-CM

## 2019-05-21 PROCEDURE — 99214 OFFICE O/P EST MOD 30 MIN: CPT | Mod: ZP | Performed by: INTERNAL MEDICINE

## 2019-05-21 PROCEDURE — G0463 HOSPITAL OUTPT CLINIC VISIT: HCPCS

## 2019-05-21 RX ORDER — PITAVASTATIN CALCIUM 2.09 MG/1
2 TABLET, FILM COATED ORAL DAILY
Qty: 90 TABLET | Refills: 3 | Status: SHIPPED | OUTPATIENT
Start: 2019-05-21 | End: 2021-03-22

## 2019-05-21 ASSESSMENT — MIFFLIN-ST. JEOR: SCORE: 1709.68

## 2019-05-21 NOTE — TELEPHONE ENCOUNTER
Pharmacist Note: Immunization Update    Patient: Emilie Nielsen (34 y.o., 1980)     Patient's immunization history was updated to reflect information contained in the [employer] and/or outside immunization/pharmacy records were reconciled within [address] [facility]. Health Maintenance schedule updated when appropriate.     Current immunizations now reflect:       Immunization History   Administered Date(s) Administered    Hep B Vaccine 02/25/2010, 03/29/2010, 04/05/2011    MMR 03/12/2010    Meningococcal (MCV4P) Vaccine 02/25/2010    Td 02/25/2010       Stuart Serrano, PharmD, BCACP Resent  Prescription approved per [facility] Refill Protocol.  Jeri Loja RN

## 2019-05-21 NOTE — PATIENT INSTRUCTIONS
Continue pitavastatin 2 mg daily and continue Repatha    Excellent lipids     Continue rest of the medications same,    See me in 6 months. Fasting labs before visit.

## 2019-05-21 NOTE — PROGRESS NOTES
"Michael Seals is a 73 year old male who presents for:  Chief Complaint   Patient presents with     RECHECK     6 week f/u- AK  fasting labs before visit-AK*pt r/s from 04/25/19        Vitals:    Vitals:    05/21/19 0952   BP: 122/72   BP Location: Right arm   Patient Position: Chair   Cuff Size: Adult Regular   Pulse: 62   Resp: 16   SpO2: 97%   Weight: 210 lb (95.3 kg)   Height: 5' 11\" (1.803 m)       BMI:  Estimated body mass index is 29.29 kg/m  as calculated from the following:    Height as of this encounter: 5' 11\" (1.803 m).    Weight as of this encounter: 210 lb (95.3 kg).    Pain Score:  Data Unavailable        Tianna Kendall    "

## 2019-05-21 NOTE — TELEPHONE ENCOUNTER
Prior Authorization Retail Medication Request    Medication/Dose: Livalo   ICD code (if different than what is on RX):  E78.49  Previously Tried and Failed:  Repatha, Zetia, Crestor, Pravachol,   Rationale:  Familial hyperlipidemia ( Heterozygous FH with baseline untreated LDL > 225 on many occasions , TC was 300 range    Insurance Name:  [employer] OUT OF STATE MEDICARE REPLACEMENT   Insurance ID:  [id number]       Pharmacy Information (if different than what is on RX)  Name:  [facility]  Phone:  [phone]    Leyla HASSANN, RN

## 2019-05-22 NOTE — TELEPHONE ENCOUNTER
"lisinopril (PRINIVIL/ZESTRIL) 2.5 MG tablet  Last Written Prescription Date:  8/28/18  Last Fill Quantity: 30,  # refills: 3   Last office visit: 8/27/2018 with prescribing provider:  Sharmila    Future Office Visit:      Requested Prescriptions   Pending Prescriptions Disp Refills     lisinopril (PRINIVIL/ZESTRIL) 2.5 MG tablet [Pharmacy Med Name: LISINOPRIL 2.5MG    TAB] 30 tablet 3     Sig: TAKE 1 TABLET BY MOUTH ONCE DAILY       ACE Inhibitors (Including Combos) Protocol Failed - 5/21/2019  9:01 PM        Failed - Normal serum creatinine on file in past 12 months     Recent Labs   Lab Test 03/01/19  1003   CR 1.37*             Passed - Blood pressure under 140/90 in past 12 months     BP Readings from Last 3 Encounters:   05/21/19 122/72   03/14/19 147/84   02/26/19 132/78                 Passed - Recent (12 mo) or future (30 days) visit within the authorizing provider's specialty     Patient had office visit in the last 12 months or has a visit in the next 30 days with authorizing provider or within the authorizing provider's specialty.  See \"Patient Info\" tab in inbasket, or \"Choose Columns\" in Meds & Orders section of the refill encounter.              Passed - Medication is active on med list        Passed - Patient is age 18 or older        Passed - Normal serum potassium on file in past 12 months     Recent Labs   Lab Test 03/01/19  1003   POTASSIUM 4.6               "

## 2019-05-23 RX ORDER — LISINOPRIL 2.5 MG/1
TABLET ORAL
Qty: 30 TABLET | Refills: 3 | Status: SHIPPED | OUTPATIENT
Start: 2019-05-23 | End: 2019-09-20

## 2019-05-23 NOTE — TELEPHONE ENCOUNTER
Central Prior Authorization Team   Phone: [phone]      PA Initiation    Medication: Livalo   Insurance Company: [employer] - Phone [phone] Fax [phone]  Pharmacy Filling the Rx: [facility] [place] - [address]  Filling Pharmacy Phone: [phone]  Filling Pharmacy Fax:    Start Date: 5/23/2019

## 2019-05-23 NOTE — TELEPHONE ENCOUNTER
Routing refill request to provider for review/approval because:  Labs out of range:  Creatinine  Catina SIMPSON RN

## 2019-05-23 NOTE — TELEPHONE ENCOUNTER
Central Prior Authorization Team   Phone: [phone]      Prior Authorization Approval    Authorization Effective Date: 2/22/2019  Authorization Expiration Date: 5/23/2020  Medication: Livalo   Approved Dose/Quantity:    Reference #: [id number]   Insurance Company: [employer] [place] - Phone [phone] Fax [phone]  Expected CoPay:       CoPay Card Available:      Foundation Assistance Needed:    Which Pharmacy is filling the prescription (Not needed for infusion/clinic administered): [facility] [address] [place], [place] [id number] [facility]  Pharmacy Notified: Yes  Patient Notified: Yes **Instructed pharmacy to notify patient when script is ready to /ship.**

## 2019-07-15 DIAGNOSIS — R79.89 ABNORMAL SERUM THYROID STIMULATING HORMONE (TSH) LEVEL: ICD-10-CM

## 2019-07-15 RX ORDER — LEVOTHYROXINE SODIUM 50 UG/1
TABLET ORAL
Qty: 30 TABLET | Refills: 3 | Status: SHIPPED | OUTPATIENT
Start: 2019-07-15 | End: 2019-09-11

## 2019-07-15 NOTE — TELEPHONE ENCOUNTER
"Last Written Prescription Date:  3/4/19  Last Fill Quantity: 30,  # refills: 3   Last office visit: No previous visit found with prescribing provider:  5/21/19   Future Office Visit:    Requested Prescriptions   Pending Prescriptions Disp Refills     levothyroxine (SYNTHROID/LEVOTHROID) 50 MCG tablet [Pharmacy Med Name: LEVOTHYROXIN 50MCG  TAB] 30 tablet 3     Sig: TAKE 1 TABLET BY MOUTH ONCE DAILY       Thyroid Protocol Passed - 7/15/2019 10:38 AM        Passed - Patient is 12 years or older        Passed - Recent (12 mo) or future (30 days) visit within the authorizing provider's specialty     Patient had office visit in the last 12 months or has a visit in the next 30 days with authorizing provider or within the authorizing provider's specialty.  See \"Patient Info\" tab in inbasket, or \"Choose Columns\" in Meds & Orders section of the refill encounter.              Passed - Medication is active on med list        Passed - Normal TSH on file in past 12 months     Recent Labs   Lab Test 05/16/19  0931   TSH 3.01              Prescription approved per [facility] Refill Protocol.    Leyla HASSANN, RN    "

## 2019-08-14 ENCOUNTER — TELEPHONE (OUTPATIENT)
Dept: FAMILY MEDICINE | Facility: CLINIC | Age: 74
End: 2019-08-14

## 2019-08-14 DIAGNOSIS — I25.10 ASCVD (ARTERIOSCLEROTIC CARDIOVASCULAR DISEASE): ICD-10-CM

## 2019-08-14 NOTE — TELEPHONE ENCOUNTER
"atenolol (TENORMIN) 50 MG tablet 45 tablet 3 8/27/2018     Last Written Prescription Date:  8/27/18  Last Fill Quantity: 45,  # refills: 3   Last office visit: 8/27/2018 with prescribing provider:  Jenna   Future Office Visit:  None    Requested Prescriptions   Pending Prescriptions Disp Refills     atenolol (TENORMIN) 50 MG tablet 45 tablet 3     Sig: Take 0.5 tablets (25 mg) by mouth daily       Beta-Blockers Protocol Passed - 8/14/2019  1:39 PM        Passed - Blood pressure under 140/90 in past 12 months     BP Readings from Last 3 Encounters:   05/21/19 122/72   03/14/19 147/84   02/26/19 132/78                 Passed - Patient is age 6 or older        Passed - Recent (12 mo) or future (30 days) visit within the authorizing provider's specialty     Patient had office visit in the last 12 months or has a visit in the next 30 days with authorizing provider or within the authorizing provider's specialty.  See \"Patient Info\" tab in inbasket, or \"Choose Columns\" in Meds & Orders section of the refill encounter.              Passed - Medication is active on med list        [facility] Follow-up to PHQ 7/29/2016   PHQ-9 9. Suicide Ideation past 2 weeks Not at all         "

## 2019-08-15 RX ORDER — ATENOLOL 50 MG/1
25 TABLET ORAL DAILY
Qty: 15 TABLET | Refills: 0 | Status: SHIPPED | OUTPATIENT
Start: 2019-08-15 | End: 2019-09-11

## 2019-08-15 NOTE — TELEPHONE ENCOUNTER
Medication filled 1 time as pt is due for a follow-up in clinic. , Please reach out to patient to schedule appointment. thank you    Kendra ABDULLAHI RN

## 2019-09-11 ENCOUNTER — OFFICE VISIT (OUTPATIENT)
Dept: FAMILY MEDICINE | Facility: CLINIC | Age: 74
End: 2019-09-11
Payer: COMMERCIAL

## 2019-09-11 VITALS
WEIGHT: 210.2 LBS | TEMPERATURE: 97.8 F | HEIGHT: 71 IN | BODY MASS INDEX: 29.43 KG/M2 | SYSTOLIC BLOOD PRESSURE: 122 MMHG | HEART RATE: 50 BPM | OXYGEN SATURATION: 97 % | DIASTOLIC BLOOD PRESSURE: 76 MMHG

## 2019-09-11 DIAGNOSIS — I25.10 ASCVD (ARTERIOSCLEROTIC CARDIOVASCULAR DISEASE): Primary | ICD-10-CM

## 2019-09-11 DIAGNOSIS — I10 ESSENTIAL HYPERTENSION: ICD-10-CM

## 2019-09-11 DIAGNOSIS — E03.9 HYPOTHYROIDISM, UNSPECIFIED TYPE: ICD-10-CM

## 2019-09-11 DIAGNOSIS — M25.571 PAIN IN JOINT INVOLVING ANKLE AND FOOT, RIGHT: ICD-10-CM

## 2019-09-11 DIAGNOSIS — K21.9 GASTROESOPHAGEAL REFLUX DISEASE WITHOUT ESOPHAGITIS: ICD-10-CM

## 2019-09-11 DIAGNOSIS — E78.5 HYPERLIPIDEMIA WITH TARGET LDL LESS THAN 70: ICD-10-CM

## 2019-09-11 DIAGNOSIS — Z12.11 SPECIAL SCREENING FOR MALIGNANT NEOPLASMS, COLON: ICD-10-CM

## 2019-09-11 DIAGNOSIS — K22.4 ESOPHAGEAL DYSMOTILITY: ICD-10-CM

## 2019-09-11 DIAGNOSIS — K21.00 GASTROESOPHAGEAL REFLUX DISEASE WITH ESOPHAGITIS: ICD-10-CM

## 2019-09-11 PROCEDURE — 99214 OFFICE O/P EST MOD 30 MIN: CPT | Performed by: INTERNAL MEDICINE

## 2019-09-11 PROCEDURE — 36415 COLL VENOUS BLD VENIPUNCTURE: CPT | Performed by: INTERNAL MEDICINE

## 2019-09-11 PROCEDURE — 84443 ASSAY THYROID STIM HORMONE: CPT | Performed by: INTERNAL MEDICINE

## 2019-09-11 PROCEDURE — 84439 ASSAY OF FREE THYROXINE: CPT | Performed by: INTERNAL MEDICINE

## 2019-09-11 PROCEDURE — 80053 COMPREHEN METABOLIC PANEL: CPT | Performed by: INTERNAL MEDICINE

## 2019-09-11 RX ORDER — RABEPRAZOLE SODIUM 20 MG/1
20 TABLET, DELAYED RELEASE ORAL DAILY
Qty: 90 TABLET | Refills: 3 | Status: SHIPPED | OUTPATIENT
Start: 2019-09-11 | End: 2020-09-10

## 2019-09-11 RX ORDER — OMEPRAZOLE AND SODIUM BICARBONATE 40; 1100 MG/1; MG/1
1 CAPSULE ORAL DAILY PRN
Qty: 30 CAPSULE | Refills: 3 | Status: SHIPPED | OUTPATIENT
Start: 2019-09-11 | End: 2020-06-12

## 2019-09-11 RX ORDER — ATENOLOL 50 MG/1
25 TABLET ORAL DAILY
Qty: 45 TABLET | Refills: 3 | Status: SHIPPED | OUTPATIENT
Start: 2019-09-11 | End: 2020-09-10

## 2019-09-11 RX ORDER — LEVOTHYROXINE SODIUM 50 UG/1
50 TABLET ORAL DAILY
Qty: 90 TABLET | Refills: 3 | Status: SHIPPED | OUTPATIENT
Start: 2019-09-11 | End: 2019-09-15

## 2019-09-11 ASSESSMENT — MIFFLIN-ST. JEOR: SCORE: 1705.59

## 2019-09-11 NOTE — PATIENT INSTRUCTIONS
(I25.10) ASCVD (arteriosclerotic cardiovascular disease)  (primary encounter diagnosis)  Comment: Dicussed periodic orthostatic hypotension, Plan to maintain good hydration and keep on current blood pressure medications. Also continue livalo  Plan: atenolol (TENORMIN) 50 MG tablet, Comprehensive        metabolic panel            (K21.9) Gastroesophageal reflux disease without esophagitis  Comment: comprehensive metabolic panel will be checked today and continue Aciphex daily with as needed use of zegrid.  Consider follow up upper endoscopy as it has been 10 years since your last  Plan: Comprehensive metabolic panel            (E78.5) Hyperlipidemia with target LDL less than 70  Comment: doing fantastic  Plan:       (I10) Essential hypertension  Comment: blood pressure is excellent  Plan:     (Z12.11) Special screening for malignant neoplasms, colon  Comment: Referral for colonoscopy given  Plan: GASTROENTEROLOGY ADULT REF PROCEDURE ONLY         Jaelyn Astrid [phone]            (K22.4) Esophageal dysmotility  Comment:   Plan: omeprazole-sodium bicarbonate (ZEGERID)         MG capsule            (K21.0) Gastroesophageal reflux disease with esophagitis  Comment:   Plan: RABEprazole (ACIPHEX) 20 MG EC tablet            (E03.9) Hypothyroidism, unspecified type  Comment: check TSH and continue thyorid replacement  Plan: TSH with free T4 reflex, levothyroxine         (SYNTHROID/LEVOTHROID) 50 MCG tablet             Referral to podiatry given     Shingrix vaccine is now available.  I would call your insurance to see if a shingles vaccine is covered and get this at your pharmacy

## 2019-09-11 NOTE — PROGRESS NOTES
"[facility]  CLINIC PROGRESS NOTE    Subjective:     ASCVD (arteriosclerotic cardiovascular disease)  Gastroesophageal reflux disease without esophagitis  Hyperlipidemia with target LDL less than 70  Essential hypertension    Michael Seals returns to the clinic today for routine follow up.  He has been doing well.   He is working out 4-5 times per week.  He has joined a healthclub and lifts weights most days and walking.  No chest pain.  He is eating well.  He is tolerating livalo and taking coenzyme Q10 which seems to help.  He is also taking Repatha.      Past medical history, medications, allergies, social history, family history reviewed and updated in [facility] as of 9/11/2019 .    ROS  CONSTITUTIONAL: no fatigue, no unexpected change in weight  SKIN: no worrisome rashes, no worrisome moles, no worrisome lesions  EYES: no acute vision problems or changes  ENT: no ear problems, no mouth problems, no throat problems  RESP: no significant cough, no shortness of breath  CV: no chest pain, no palpitations, no new or worsening peripheral edema  GI: no nausea, no vomiting, no constipation, no diarrhea  : no frequency, no dysuria, no hematuria  MS: no claudication, no myalgias, no joint aches  PSYCHIATRIC: no changes in mood or affect      Objective:  Vitals  /76 (BP Location: Left arm, Patient Position: Sitting, Cuff Size: Adult Regular)   Pulse 50   Temp 97.8  F (36.6  C) (Oral)   Ht 1.803 m (5' 11\")   Wt 95.3 kg (210 lb 3.2 oz)   SpO2 97%   BMI 29.32 kg/m    GEN: Alert Oriented x3 NAD  HEENT: Atraumatic, normocephalic, neck supple, no thyromegaly, negative cervical adenopathy  TM: TM bilaterally pearly and grey with normal light reflex  CV: RRR no murmurs or rubs  PULM: CTA no wheezes or crackles  ABD: Soft, nontender nondistended, no hepatosplenomegally  SKIN: No visible skin lesion or ulcerations  EXT: 2+ dorsal pedis pulses, no edema bilateral lower extremities  NEURO: Gait and station normal , " No focal neurologic deficits  PSYCH: Mood good, affect mood congruent    No images are attached to the encounter.    No results found for this or any previous visit (from the past 24 hour(s)).    Assessment/Plan:  Patient Instructions   (I25.10) ASCVD (arteriosclerotic cardiovascular disease)  (primary encounter diagnosis)  Comment: Dicussed periodic orthostatic hypotension, Plan to maintain good hydration and keep on current blood pressure medications. Also continue livalo  Plan: atenolol (TENORMIN) 50 MG tablet, Comprehensive        metabolic panel            (K21.9) Gastroesophageal reflux disease without esophagitis  Comment: comprehensive metabolic panel will be checked today and continue Aciphex daily with as needed use of zegrid.  Consider follow up upper endoscopy as it has been 10 years since your last  Plan: Comprehensive metabolic panel            (E78.5) Hyperlipidemia with target LDL less than 70  Comment: doing fantastic  Plan:       (I10) Essential hypertension  Comment: blood pressure is excellent  Plan:     (Z12.11) Special screening for malignant neoplasms, colon  Comment: Referral for colonoscopy given  Plan: GASTROENTEROLOGY ADULT REF PROCEDURE ONLY         Jaelyn Resendiz [phone]            (K22.4) Esophageal dysmotility  Comment:   Plan: omeprazole-sodium bicarbonate (ZEGERID)         MG capsule            (K21.0) Gastroesophageal reflux disease with esophagitis  Comment:   Plan: RABEprazole (ACIPHEX) 20 MG EC tablet            (E03.9) Hypothyroidism, unspecified type  Comment: check TSH and continue thyorid replacement  Plan: TSH with free T4 reflex, levothyroxine         (SYNTHROID/LEVOTHROID) 50 MCG tablet             Referral to podiatry given     Follow up in 1 year    Disclaimer: This note consists of symbols derived from keyboarding, dictation and/or voice recognition software. As a result, there may be errors in the script that have gone undetected. Please consider this  when interpreting information found in this chart.    Josef Doll MD  [phone]

## 2019-09-12 LAB
ALBUMIN SERPL-MCNC: 3.7 G/DL (ref 3.4–5)
ALP SERPL-CCNC: 90 U/L (ref 40–150)
ALT SERPL W P-5'-P-CCNC: 38 U/L (ref 0–70)
ANION GAP SERPL CALCULATED.3IONS-SCNC: 7 MMOL/L (ref 3–14)
AST SERPL W P-5'-P-CCNC: 22 U/L (ref 0–45)
BILIRUB SERPL-MCNC: 0.4 MG/DL (ref 0.2–1.3)
BUN SERPL-MCNC: 23 MG/DL (ref 7–30)
CALCIUM SERPL-MCNC: 9.2 MG/DL (ref 8.5–10.1)
CHLORIDE SERPL-SCNC: 107 MMOL/L (ref 94–109)
CO2 SERPL-SCNC: 24 MMOL/L (ref 20–32)
CREAT SERPL-MCNC: 1.35 MG/DL (ref 0.66–1.25)
GFR SERPL CREATININE-BSD FRML MDRD: 51 ML/MIN/{1.73_M2}
GLUCOSE SERPL-MCNC: 98 MG/DL (ref 70–99)
POTASSIUM SERPL-SCNC: 5.1 MMOL/L (ref 3.4–5.3)
PROT SERPL-MCNC: 7.7 G/DL (ref 6.8–8.8)
SODIUM SERPL-SCNC: 138 MMOL/L (ref 133–144)
T4 FREE SERPL-MCNC: 0.84 NG/DL (ref 0.76–1.46)
TSH SERPL DL<=0.005 MIU/L-ACNC: 6.3 MU/L (ref 0.4–4)

## 2019-09-15 ENCOUNTER — TELEPHONE (OUTPATIENT)
Dept: FAMILY MEDICINE | Facility: CLINIC | Age: 74
End: 2019-09-15

## 2019-09-15 DIAGNOSIS — E03.9 HYPOTHYROIDISM, UNSPECIFIED TYPE: ICD-10-CM

## 2019-09-15 RX ORDER — LEVOTHYROXINE SODIUM 75 UG/1
75 TABLET ORAL DAILY
Qty: 90 TABLET | Refills: 3 | Status: SHIPPED | OUTPATIENT
Start: 2019-09-15 | End: 2020-09-10

## 2019-09-15 NOTE — RESULT ENCOUNTER NOTE
Kunal Rodriguez,    I have had the opportunity to review your recent results and an interpretation is as follows:  Your follow up thyroid labs show an elevated TSH and we should increase your dose of levothyroxine from 50 mcg to 75 mcg and recheck in 6 weeks   Your comprehensive metabolic panel shows stable renal function and stable liver function tests     Sincerely,  Josef Doll MD

## 2019-09-15 NOTE — TELEPHONE ENCOUNTER
Can we call Michael Seals and let him know that     Kunal Rodriguez,    I have had the opportunity to review your recent results and an interpretation is as follows:  Your follow up thyroid labs show an elevated TSH and we should increase your dose of levothyroxine from 50 mcg to 75 mcg and recheck in 6 weeks   Your comprehensive metabolic panel shows stable renal function and stable liver function tests     Sincerely,  Josef Doll MD

## 2019-09-16 ENCOUNTER — TELEPHONE (OUTPATIENT)
Dept: FAMILY MEDICINE | Facility: CLINIC | Age: 74
End: 2019-09-16

## 2019-09-16 NOTE — TELEPHONE ENCOUNTER
Result Notes for TSH with free T4 reflex     Notes recorded by Josef Doll MD on 9/15/2019 at 11:31 AM CDT  Hi Michael,    I have had the opportunity to review your recent results and an interpretation is as follows:  Your follow up thyroid labs show an elevated TSH and we should increase your dose of levothyroxine from 50 mcg to 75 mcg and recheck in 6 weeks   Your comprehensive metabolic panel shows stable renal function and stable liver function tests     Sincerely,  Josef Doll MD

## 2019-09-19 NOTE — TELEPHONE ENCOUNTER
Spoke with pt- relayed message  Pt already has higher dose 9/15/19  Pt will call back to schedule lab appt in 6 weeks    Brian BUNCH RN

## 2019-09-23 ENCOUNTER — ANCILLARY PROCEDURE (OUTPATIENT)
Dept: GENERAL RADIOLOGY | Facility: CLINIC | Age: 74
End: 2019-09-23
Attending: PODIATRIST
Payer: COMMERCIAL

## 2019-09-23 ENCOUNTER — OFFICE VISIT (OUTPATIENT)
Dept: PODIATRY | Facility: CLINIC | Age: 74
End: 2019-09-23
Payer: COMMERCIAL

## 2019-09-23 VITALS
HEIGHT: 71 IN | WEIGHT: 210 LBS | BODY MASS INDEX: 29.4 KG/M2 | DIASTOLIC BLOOD PRESSURE: 80 MMHG | SYSTOLIC BLOOD PRESSURE: 132 MMHG

## 2019-09-23 DIAGNOSIS — M25.571 SINUS TARSITIS OF RIGHT FOOT: ICD-10-CM

## 2019-09-23 DIAGNOSIS — G89.29 CHRONIC PAIN OF RIGHT ANKLE: ICD-10-CM

## 2019-09-23 DIAGNOSIS — M21.42 PES PLANUS OF BOTH FEET: Primary | ICD-10-CM

## 2019-09-23 DIAGNOSIS — M25.571 RIGHT ANKLE PAIN: ICD-10-CM

## 2019-09-23 DIAGNOSIS — M25.571 CHRONIC PAIN OF RIGHT ANKLE: ICD-10-CM

## 2019-09-23 DIAGNOSIS — M21.41 PES PLANUS OF BOTH FEET: Primary | ICD-10-CM

## 2019-09-23 DIAGNOSIS — M76.821 POSTERIOR TIBIAL TENDONITIS, RIGHT: ICD-10-CM

## 2019-09-23 PROCEDURE — 99204 OFFICE O/P NEW MOD 45 MIN: CPT | Performed by: PODIATRIST

## 2019-09-23 PROCEDURE — 73610 X-RAY EXAM OF ANKLE: CPT | Mod: RT

## 2019-09-23 ASSESSMENT — MIFFLIN-ST. JEOR: SCORE: 1704.68

## 2019-09-23 NOTE — PROGRESS NOTES
PATIENT HISTORY:  Dr. Doll requested I see this patient for their foot issue.  Michael Seals is a 74 year old male who presents to clinic for right foot pain. Notes it has been going on for years. Deep ache with walking. Notes it is very sore in the morning or when getting up from sitting. Wears a brace which helps some. Wondering what is causing the pain and what can be done for it. Notes 6 1/2 years ago he had a double hip fracture and that is when he started to notice some weakness and pain to right foot.     Review of Systems:  Patient denies fever, chills, rash, wound, stiffness, numbness, weakness, heart burn, blood in stool, chest pain with activity, calf pain when walking, shortness of breath with activity, chronic cough, easy bleeding/bruising, swelling of ankles, excessive thirst, fatigue, depression, anxiety.  Patient admits to liming at times.     PAST MEDICAL HISTORY:   Past Medical History:   Diagnosis Date     CAD (coronary artery disease)     3/2012 LILLIAN RCA, 8/2015 Heart cath - previously placed RCA stent patent, 70% mid LAD stenosis, 50% prox CFX, EF 55-60%     CKD (chronic kidney disease) stage 2, GFR 60-89 ml/min     baseline crt 1.20 - 1.60     Esophageal dysmotility      GERD (gastroesophageal reflux disease)      H pylori ulcer      Hip fracture (H) 01/2013    right side - closed fracture - no surgery     HTN (hypertension)      Hyperlipidaemia LDL goal <100      Moderate major depression (H)      S/P appendectomy      S/P hernia repair     left     S/P PTCA (percutaneous transluminal coronary angioplasty) 03/2012    LILLIAN RCA        PAST SURGICAL HISTORY:   Past Surgical History:   Procedure Laterality Date     APPENDECTOMY       CORONARY ANGIOGRAPHY ADULT ORDER  8/2015    previously placed RCA stent patent, 70% mid LAD stenosis, 50% prox CFX, EF 55-60%     HEART CATH STENT COR W/WO PTCA  3/2012    LILLIAN to RCA      HERNIA REPAIR          MEDICATIONS:   Current Outpatient Medications:      ACE  NOT PRESCRIBED, INTENTIONAL,, continuous prn Reported on 4/21/2017, Disp: , Rfl:      aspirin (SB LOW DOSE ASA EC) 81 MG EC tablet, Take 81 mg by mouth daily., Disp: , Rfl:      atenolol (TENORMIN) 50 MG tablet, Take 0.5 tablets (25 mg) by mouth daily, Disp: 45 tablet, Rfl: 3     evolocumab (REPATHA) 140 MG/ML prefilled autoinjector, Inject 140 mg Subcutaneous every 14 days, Disp: , Rfl:      levothyroxine (SYNTHROID/LEVOTHROID) 75 MCG tablet, Take 1 tablet (75 mcg) by mouth daily, Disp: 90 tablet, Rfl: 3     lisinopril (PRINIVIL/ZESTRIL) 2.5 MG tablet, TAKE 1 TABLET BY MOUTH ONCE DAILY, Disp: 90 tablet, Rfl: 3     Multiple Vitamins-Minerals (MULTIVITAMIN PO), Take by mouth daily, Disp: , Rfl:      nitroglycerin (NITROSTAT) 0.4 MG SL tablet, Place 1 tablet (0.4 mg) under the tongue every 5 minutes as needed for chest pain, Disp: 90 tablet, Rfl: 3     omeprazole-sodium bicarbonate (ZEGERID)  MG capsule, Take 1 capsule (40 mg) by mouth daily as needed, Disp: 30 capsule, Rfl: 3     pitavastatin (LIVALO) 2 MG TABS tablet, Take 1 tablet (2 mg) by mouth daily, Disp: 90 tablet, Rfl: 3     RABEprazole (ACIPHEX) 20 MG EC tablet, Take 1 tablet (20 mg) by mouth daily, Disp: 90 tablet, Rfl: 3     ALLERGIES:    Allergies   Allergen Reactions     Atorvastatin      Crestor [Rosuvastatin]      Muscle aches     Pravastatin      Simvastatin      Zetia [Ezetimibe]      Myalgias          SOCIAL HISTORY:   Social History     Socioeconomic History     Marital status:      Spouse name: Not on file     Number of children: Not on file     Years of education: Not on file     Highest education level: Not on file   Occupational History     Not on file   Social Needs     Financial resource strain: Not on file     Food insecurity:     Worry: Not on file     Inability: Not on file     Transportation needs:     Medical: Not on file     Non-medical: Not on file   Tobacco Use     Smoking status: Former Smoker     Packs/day: 1.00      Years: 8.00     Pack years: 8.00     Types: Cigarettes     Last attempt to quit: 1970     Years since quittin.7     Smokeless tobacco: Never Used   Substance and Sexual Activity     Alcohol use: Yes     Alcohol/week: 10.0 standard drinks     Types: 10 Standard drinks or equivalent per week     Comment: 1-2 glasses of wine per evening with dinner     Drug use: No     Sexual activity: Yes     Partners: Female   Lifestyle     Physical activity:     Days per week: Not on file     Minutes per session: Not on file     Stress: Not on file   Relationships     Social connections:     Talks on phone: Not on file     Gets together: Not on file     Attends Tenriism service: Not on file     Active member of club or organization: Not on file     Attends meetings of clubs or organizations: Not on file     Relationship status: Not on file     Intimate partner violence:     Fear of current or ex partner: Not on file     Emotionally abused: Not on file     Physically abused: Not on file     Forced sexual activity: Not on file   Other Topics Concern      Service Not Asked     Blood Transfusions Not Asked     Caffeine Concern Yes     Comment: 3-4 cups coffee per day     Occupational Exposure Not Asked     Hobby Hazards Not Asked     Sleep Concern Yes     Comment: off and on     Stress Concern Not Asked     Weight Concern Yes     Special Diet No     Back Care Not Asked     Exercise Yes     Comment: walking 5 days week, 2.5 miles, biking occasionally     Bike Helmet Not Asked     Seat Belt Not Asked     Self-Exams Not Asked     Parent/sibling w/ CABG, MI or angioplasty before 65F 55M? Not Asked   Social History Narrative     Not on file        FAMILY HISTORY:   Family History   Problem Relation Age of Onset     Cerebrovascular Disease Mother      Cerebrovascular Disease Father      C.A.D. Father      Myocardial Infarction Father      Parkinsonism Brother      Heart Failure Brother      Other - See Comments Brother          " in [place]        EXAM:Vitals: /80   Ht 1.803 m (5' 11\")   Wt 95.3 kg (210 lb)   BMI 29.29 kg/m    BMI= Body mass index is 29.29 kg/m .    General appearance: Patient is alert and fully cooperative with history & exam.  No sign of distress is noted during the visit.     Psychiatric: Affect is pleasant & appropriate.  Patient appears motivated to improve health.     Respiratory: Breathing is regular & unlabored while sitting.     HEENT: Hearing is intact to spoken word.  Speech is clear.  No gross evidence of visual impairment that would impact ambulation.     Dermatologic: Skin is intact to both lower extremities without significant lesions, rash or abrasion.  No paronychia or evidence of soft tissue infection is noted.     Vascular: DP & PT pulses are intact & regular bilaterally.  no edema but varicosities noted.  CFT and skin temperature is normal to both lower extremities.     Neurologic: Lower extremity sensation is intact to light touch.  No evidence of weakness or contracture in the lower extremities.  No evidence of neuropathy.     Musculoskeletal: Patient is ambulatory without assistive device or brace. Decrease arch height. Pain along palpation of the right posterior tibial tendon. Pain with inversion and with palpation to sinus tarsi.     Radiographs:  Right foot xrays - decrease calcaneal inclination angle. No fractures noted. Minimal calcaneal heel spur.      ASSESSMENT:    Pes planus of both feet  Posterior tibial tendonitis, right  Sinus tarsitis of right foot  Chronic pain of right ankle     PLAN:  Reviewed patient's chart in [facility]. Reviewed xrays. Reviewed and discussed causes of tendonitis.  We discussed treatments such as immobiliation, icing, stretching, heel lifts, orthotics, physical therapy, MRI.     Talked about arthritis and treatments including orthotics, injections, icing, NSAIDS, bracing, physical therapy, compounding pain cream, or surgical intervention.    Given the " chronicity, recommend MRI. If arthritis could try injection. If tendon injury, may need surgery vs permanent bracing. If negative, may be more related to nerve from previous double hip fracture injury and that is when he first noticed the weakness. Will call with results.      Daisy Luque DPM, Podiatry/Foot and Ankle Surgery    Weight management plan: Patient was referred to their PCP to discuss a diet and exercise plan.    Recommended to Michael Seals to follow up with Primary Care provider regarding elevated blood pressure.

## 2019-09-23 NOTE — PATIENT INSTRUCTIONS
Thank you for choosing [facility]!    DR. MENDOZA'S CLINIC SCHEDULE  MONDAY AM - [place] TUESDAY - [place]   [address] [address] [place] [address] [place] [address]   [phone] / [phone] / [phone]       WEDNESDAY - [place] FRIDAY AM - [facility]   [address]   [place] [address] [place] [address]   [phone] / [phone]       FRIDAY PM - [place] SCHEDULE SURGERY: [phone]   [address] [address] BILLING QUESTIONS: [phone]   [place] [address] AFTER HOURS: [phone] / [phone] APPOINTMENTS: [phone]     Consumer Price Line (CPL) [phone]       Please call to schedule your MRI/CT/Ultrasound/Arthrogram appointment.  The number is [phone].      TENDONITIS   Tendons are the strong fibrous portions ofmuscles that attach to bones and allow the muscle to move a joint when it contracts. Tendons are very strong because they have a lot of force exerted on them. Sometimes tendons can become painful because they have suffered an acute injury, in which too much force was exerted at one time, or an overuse injury, in which a normal force was exerted too frequently or over a prolonged period of time. As a result, there is damage to the tendon and its surrounding soft tissue structures and they become inflammed. Because tendons do not have a great blood supply, they do not heal rapidly and the inflammation can become chronic.   Conservative treatment for tendinitis involves rest and anti-inflammatory measures. Ice is applied 15 minutes 2-3 times daily. Anti-inflammatory medications called NSAIDs (ibuprofen, example) can be taken provided they are used with caution, as they can lead to internal bleeding and increase the risk ofstroke and heart attack. Sometimes topical nitroglycerin is prescribed to help with pain. Often your doctor will  use a special shoe or removable walking cast to immobilize the tendon, allowing it to heal without further damage from use. These devices are very useful in helping tendons heal, but they may slow you down or make you feel like your hip, knee, or back are out ofalignment. This is temporary and should go away once you are out ofthe immobilization. You should not use a walking cast when showering or driving. Another option is Platelet Rich Plasma injections. (Normally done with a Sports and Orthorapedic doctor.   If conservative measures fail, your physician may need to surgically repair the tendon by removing any chronic inflammatory tissue and sewing it back together. Sometimes it is sewn to an adjacent tendon with similar function for support and sometimes it is lengthened. . Sometimes the bones around the tendon need to be realigned or reshaped to better support the tendon or prevent further damage. Your foot and ankle surgeon will discuss the specifics of your surgery with you, should you need it.    Towel stretch: Sit on a hard surface with your injured leg stretched out in front of you. Loop a towel around your toes and the ball of your foot and pull the towel toward your body keeping your leg straight. Hold this position for 15 to 30 seconds and then relax. Repeat 3 times. Then push the towel away with the ball of your foot. Repeat 3 times.  When you don't feel much of a stretch using the towel, you can start the standing calf stretch and the following exercises.  Standing calf stretch: Stand facing a wall with your hands on the wall at about eye level. Keep your injured leg back with your heel on the floor. Keep the other leg forward with the knee bent. Turn your back foot slightly inward (as if you were pigeon-toed). Slowly lean into the wall until you feel a stretch in the back of your calf. Hold the stretch for 15 to 30 seconds. Return to the starting position. Repeat 3 times. Do this exercise several  times each day.   Standing soleus stretch: Stand facing a wall with your hands on the wall at about chest height. Keep your injured leg back with your heel on the floor. Keep the other leg forward with the knee bent. Turn your back foot slightly inward (as if you were pigeon-toed). Bend your back knee slightly and gently lean into the wall until you feel a stretch in the lower calf of your injured leg. Hold the stretch for 15 to 30 seconds. Return to the starting position. Repeat 3 times.   Achilles stretch: Stand with the ball of one foot on a stair. Reach for the step below with your heel until you feel a stretch in the arch of your foot. Hold this position for 15 to 30 seconds and then relax. Repeat 3 times.   Heel raise: Balance yourself while standing behind a chair or counter. Using the chair or counter as a support to help you, raise your body up onto your toes and hold for 5 seconds. Then slowly lower yourself down without holding onto the support. (It's OK to keep holding onto the support if you need to.) When this exercise becomes less painful, try lowering yourself down on the injured leg only. Repeat 15 times. Do 2 sets of 15. Rest 30 seconds between sets.   Step-up: Stand with the foot of your injured leg on a support 3 to 5 inches high (like a small step or block of wood). Keep your other foot flat on the floor. Shift your weight onto the injured leg on the support. Straighten your injured leg as the other leg comes off the floor. Return to the starting position by bending your injured leg and slowly lowering your uninjured leg back to the floor. Do 2 sets of 15.   Resisted ankle eversion: Sit with both legs stretched out in front of you, with your feet about a shoulder's width apart. Tie a loop in one end of elastic tubing. Put the foot of your injured leg through the loop so that the tubing goes around the arch of that foot and wraps around the outside of the other foot. Hold onto the other end of  the tubing with your hand to provide tension. Turn the foot of your injured leg up and out. Make sure you keep your other foot still so that it will allow the tubing to stretch as you move the foot of your injured leg. Return to the starting position. Do 2 sets of 15.   Balance and reach exercises: Stand next to a chair with your injured leg farther from the chair. The chair will provide support if you need it. Stand on the foot of your injured leg and bend your knee slightly. Try to raise the arch of this foot while keeping your big toe on the floor. Keep your foot in this position. With the hand that is farther away from the chair, reach forward in front of you by bending at the waist. Avoid bending your knee any more as you do this. Repeat this 10 times. To make the exercise more challenging, reach farther in front of you. Do 2 sets of 10.  the same position as above. While keeping your arch height, reach the hand that is farther away from the chair across your body toward the chair. The farther you reach, the more challenging the exercise. Do 2 sets of 10.     Resisted ankle eversion: Sit with both legs stretched out in front of you, with your feet about a shoulder's width apart. Tie a loop in one end of elastic tubing. Put the foot of your injured leg through the loop so that the tubing goes around the arch of that foot and wraps around the outside of the other foot. Hold onto the other end of the tubing with your hand to provide tension. Turn the foot of your injured leg up and out. Make sure you keep your other foot still so that it will allow the tubing to stretch as you move the foot of your injured leg. Return to the starting position. Do 2 sets of 15.   If you have access to a wobble board, do the following exercises:  Wobble board exercises:   Stand on a wobble board with your feet shoulder width apart. Rock the board forwards and backwards 30 times, then side to side 30 times. Hold on to a chair  if you need support.   Rotate the wobble board around so that the edge of the board is in contact with the floor at all times. Do this 30 times in a clockwise and then a counterclockwise direction.   Balance on the wobble board for as long as you can without letting the edges touch the floor. Try to do this for 2 minutes without touching the floor.   Rotate the wobble board in clockwise and counterclockwise circles, but do not let the edge of the board touch the floor.   When you have mastered exercises A through D, try repeating them while standing on just your injured leg.   After you are able to do these exercises on one leg, try to do them with your eyes closed. Make sure you have something nearby to support you in case you lose your balance.    OSTEOARTHRITIS OF THE FOOT & ANKLE  Osteoarthritis is a condition characterized by the breakdown and eventual loss of cartilage in one or more joints. Cartilage (the connective tissue found at the end of the bones in the joints) protects and cushions the bones during movement. When cartilage deteriorates or is lost, symptoms develop that can restrict one s ability to easily perform daily activities.  Osteoarthritis is also known as degenerative arthritis, reflecting its nature to develop as part of the aging process. As the most common form of arthritis, osteoarthritis affects millions of Americans. Some people refer to osteoarthritis simply as arthritis, even though there are many different types of arthritis.  Osteoarthritis appears at various joints throughout the body, including the hands, feet, spine, hips, and knees. In the foot, the disease most frequently occurs in the big toe, although it is also often found in the midfoot and ankle.  CAUSES  Osteoarthritis is considered a  wear and tear  disease because the cartilage in the joint wears down with repeated stress and use over time. As the cartilage deteriorates and gets thinner, the bones lose their protective  covering and eventually may rub together, causing pain and inflammation of the joint.  An injury may also lead to osteoarthritis, although it may take months or years after the injury for the condition to develop. For example, osteoarthritis in the big toe is often caused by kicking or jamming the toe, or by dropping something on the toe. Osteoarthritis in the midfoot is often caused by dropping something on it, or by a sprain or fracture. In the ankle, osteoarthritis is usually caused by a fracture and occasionally by a severe sprain.  Sometimes osteoarthritis develops as a result of abnormal foot mechanics such as flat feet or high arches. A flat foot causes less stability in the ligaments (bands of tissue that connect bones), resulting in excessive strain on the joints, which can cause arthritis. A high arch is rigid and lacks mobility, causing a jamming of joints that creates an increased risk of arthritis.  SYMPTOMS  People with osteoarthritis in the foot or ankle experience, in varying degrees, one or more of the following: Pain and stiffness in the joint, swelling in or near the joint, or difficulty walking or bending the joint.   Some patients with osteoarthritis also develop a bone spur (a bony protrusion) at the affected joint. Shoe pressure may cause pain at the site of a bone spur, and in some cases blisters or calluses may form over its surface. Bone spurs can also limit the movement of the joint.    DIAGNOSIS  In diagnosing osteoarthritis, the foot and ankle surgeon will examine the foot thoroughly, looking for swelling in the joint, limited mobility, and pain with movement. In some cases, deformity and/or enlargement (spur) of the joint may be noted. X-rays may be ordered to evaluate the extent of the disease.  NON-SURGICAL TREATMENT  To help relieve symptoms, the surgeon may begin treating osteoarthritis with one or more of the following non-surgical approaches:  Oral medications. Nonsteroidal  anti-inflammatory drugs (NSAIDs), such as ibuprofen, are often helpful in reducing the inflammation and pain. Occasionally a prescription for a steroid medication is needed to adequately reduce symptoms.   Orthotic devices. Custom orthotic devices (shoe inserts) are often prescribed to provide support to improve the foot s mechanics or cushioning to help minimize pain.   Bracing. Bracing, which restricts motion and supports the joint, can reduce pain during walking and help prevent further deformity.   Immobilization. Protecting the foot from movement by wearing a cast or removable cast-boot may be necessary to allow the inflammation to resolve.   Steroid injections. In some cases, steroid injections are applied to the affected joint to deliver anti-inflammatory medication.   Physical therapy. Exercises to strengthen the muscles, especially when the osteoarthritis occurs in the ankle, may give the patient greater stability and help avoid injury that might worsen the condition.   DO I NEED SURGERY?  When osteoarthritis has progressed substantially or failed to improve with non-surgical treatment, surgery may be recommended. In advanced cases, surgery may be the only option. The goal of surgery is to decrease pain and improve function. The foot and ankle surgeon will consider a number of factors when selecting the procedure best suited to the patient s condition and lifestyle.        BODY WEIGHT AND YOUR FEET  The following information is included in the after visit summary for all patients. Body weight can be a sensitive issue to discuss in clinic, but we think the following information is very important. Although we focus on the feet and ankles, we do support the overall health of our patients.     Many things can cause foot and ankle problems. Foot structure, activity level, foot mechanics and injuries are common causes of pain. One very important issue that often goes unmentioned, is body weight. Extra weight can  cause increased stress on muscles, ligaments, bones and tendons. Sometimes just a few extra pounds is all it takes to put one over her/his threshold. Without reducing that stress, it can be difficult to alleviate pain. As Foot & Ankle specialists, our job is addressing the lower extremity problem and possible causes. Regarding extra body weight, we encourage patients to discuss diet and weight management plans with their primary care doctors. It is this team approach that gives you the best opportunity for pain relief and getting you back on your feet.      [facility] has a Comprehensive Weight Management Program. This program includes counseling, education, non-surgical and surgical approaches to weight loss. If you are interested in learning more either talk to you primary care provider or call [phone].

## 2019-09-23 NOTE — LETTER
9/23/2019         RE: Michael Seals  [address]  [facility] [address]        Dear Colleague,    [facility] Please see a copy of my visit note below.    PATIENT HISTORY:  Dr. Doll requested I see this patient for their foot issue.  Michael Seals is a 74 year old male who presents to clinic for right foot pain. Notes it has been going on for years. Deep ache with walking. Notes it is very sore in the morning or when getting up from sitting. Wears a brace which helps some. Wondering what is causing the pain and what can be done for it. Notes 6 1/2 years ago he had a double hip fracture and that is when he started to notice some weakness and pain to right foot.     Review of Systems:  Patient denies fever, chills, rash, wound, stiffness, numbness, weakness, heart burn, blood in stool, chest pain with activity, calf pain when walking, shortness of breath with activity, chronic cough, easy bleeding/bruising, swelling of ankles, excessive thirst, fatigue, depression, anxiety.  Patient admits to liming at times.     PAST MEDICAL HISTORY:   Past Medical History:   Diagnosis Date     CAD (coronary artery disease)     3/2012 [facility]IAN RCA, 8/2015 Heart cath - previously placed RCA stent patent, 70% mid LAD stenosis, 50% prox CFX, EF 55-60%     CKD (chronic kidney disease) stage 2, GFR 60-89 ml/min     baseline crt 1.20 - 1.60     Esophageal dysmotility      GERD (gastroesophageal reflux disease)      H pylori ulcer      Hip fracture (H) 01/2013    right side - closed fracture - no surgery     HTN (hypertension)      Hyperlipidaemia LDL goal <100      Moderate major depression (H)      S/P appendectomy      S/P hernia repair     left     S/P PTCA (percutaneous transluminal coronary angioplasty) 03/2012    LILLIAN RCA        PAST SURGICAL HISTORY:   Past Surgical History:   Procedure Laterality Date     APPENDECTOMY       CORONARY ANGIOGRAPHY ADULT ORDER   8/2015    previously placed RCA stent patent, 70% mid LAD stenosis, 50% prox CFX, EF 55-60%     HEART CATH STENT COR W/WO PTCA  3/2012    LILLIAN to RCA      HERNIA REPAIR          MEDICATIONS:   Current Outpatient Medications:      ACE NOT PRESCRIBED, INTENTIONAL,, continuous prn Reported on 4/21/2017, Disp: , Rfl:      aspirin (SB LOW DOSE ASA EC) 81 MG EC tablet, Take 81 mg by mouth daily., Disp: , Rfl:      atenolol (TENORMIN) 50 MG tablet, Take 0.5 tablets (25 mg) by mouth daily, Disp: 45 tablet, Rfl: 3     evolocumab (REPATHA) 140 MG/ML prefilled autoinjector, Inject 140 mg Subcutaneous every 14 days, Disp: , Rfl:      levothyroxine (SYNTHROID/LEVOTHROID) 75 MCG tablet, Take 1 tablet (75 mcg) by mouth daily, Disp: 90 tablet, Rfl: 3     lisinopril (PRINIVIL/ZESTRIL) 2.5 MG tablet, TAKE 1 TABLET BY MOUTH ONCE DAILY, Disp: 90 tablet, Rfl: 3     Multiple Vitamins-Minerals (MULTIVITAMIN PO), Take by mouth daily, Disp: , Rfl:      nitroglycerin (NITROSTAT) 0.4 MG SL tablet, Place 1 tablet (0.4 mg) under the tongue every 5 minutes as needed for chest pain, Disp: 90 tablet, Rfl: 3     omeprazole-sodium bicarbonate (ZEGERID)  MG capsule, Take 1 capsule (40 mg) by mouth daily as needed, Disp: 30 capsule, Rfl: 3     pitavastatin (LIVALO) 2 MG TABS tablet, Take 1 tablet (2 mg) by mouth daily, Disp: 90 tablet, Rfl: 3     RABEprazole (ACIPHEX) 20 MG EC tablet, Take 1 tablet (20 mg) by mouth daily, Disp: 90 tablet, Rfl: 3     ALLERGIES:    Allergies   Allergen Reactions     Atorvastatin      Crestor [Rosuvastatin]      Muscle aches     Pravastatin      Simvastatin      Zetia [Ezetimibe]      Myalgias          SOCIAL HISTORY:   Social History     Socioeconomic History     Marital status:      Spouse name: Not on file     Number of children: Not on file     Years of education: Not on file     Highest education level: Not on file   Occupational History     Not on file   Social Needs     Financial resource strain: Not  on file     Food insecurity:     Worry: Not on file     Inability: Not on file     Transportation needs:     Medical: Not on file     Non-medical: Not on file   Tobacco Use     Smoking status: Former Smoker     Packs/day: 1.00     Years: 8.00     Pack years: 8.00     Types: Cigarettes     Last attempt to quit: 1970     Years since quittin.7     Smokeless tobacco: Never Used   Substance and Sexual Activity     Alcohol use: Yes     Alcohol/week: 10.0 standard drinks     Types: 10 Standard drinks or equivalent per week     Comment: 1-2 glasses of wine per evening with dinner     Drug use: No     Sexual activity: Yes     Partners: Female   Lifestyle     Physical activity:     Days per week: Not on file     Minutes per session: Not on file     Stress: Not on file   Relationships     Social connections:     Talks on phone: Not on file     Gets together: Not on file     Attends [employer] service: Not on file     Active member of club or organization: Not on file     Attends meetings of clubs or organizations: Not on file     Relationship status: Not on file     Intimate partner violence:     Fear of current or ex partner: Not on file     Emotionally abused: Not on file     Physically abused: Not on file     Forced sexual activity: Not on file   Other Topics Concern      Service Not Asked     Blood Transfusions Not Asked     Caffeine Concern Yes     Comment: 3-4 cups coffee per day     Occupational Exposure Not Asked     Hobby Hazards Not Asked     Sleep Concern Yes     Comment: off and on     Stress Concern Not Asked     Weight Concern Yes     Special Diet No     Back Care Not Asked     Exercise Yes     Comment: walking 5 days week, 2.5 miles, biking occasionally     Bike Helmet Not Asked     Seat Belt Not Asked     Self-Exams Not Asked     Parent/sibling w/ CABG, MI or angioplasty before 65F 55M? Not Asked   Social History Narrative     Not on file        FAMILY HISTORY:   Family History   Problem Relation  "Age of Onset     Cerebrovascular Disease Mother      Cerebrovascular Disease Father      C.A.D. Father      Myocardial Infarction Father      Parkinsonism Brother      Heart Failure Brother      Other - See Comments Brother          in [place]        EXAM:Vitals: /80   Ht 1.803 m (5' 11\")   Wt 95.3 kg (210 lb)   BMI 29.29 kg/m     BMI= Body mass index is 29.29 kg/m .    General appearance: Patient is alert and fully cooperative with history & exam.  No sign of distress is noted during the visit.     Psychiatric: Affect is pleasant & appropriate.  Patient appears motivated to improve health.     Respiratory: Breathing is regular & unlabored while sitting.     HEENT: Hearing is intact to spoken word.  Speech is clear.  No gross evidence of visual impairment that would impact ambulation.     Dermatologic: Skin is intact to both lower extremities without significant lesions, rash or abrasion.  No paronychia or evidence of soft tissue infection is noted.     Vascular: DP & PT pulses are intact & regular bilaterally.  no edema but varicosities noted.  CFT and skin temperature is normal to both lower extremities.     Neurologic: Lower extremity sensation is intact to light touch.  No evidence of weakness or contracture in the lower extremities.  No evidence of neuropathy.     Musculoskeletal: Patient is ambulatory without assistive device or brace. Decrease arch height. Pain along palpation of the right posterior tibial tendon. Pain with inversion and with palpation to sinus tarsi.     Radiographs:  Right foot xrays - decrease calcaneal inclination angle. No fractures noted. Minimal calcaneal heel spur.      ASSESSMENT:    Pes planus of both feet  Posterior tibial tendonitis, right  Sinus tarsitis of right foot  Chronic pain of right ankle     PLAN:  Reviewed [facility] in [facility]. Reviewed xrays. Reviewed and discussed causes of tendonitis.  We discussed treatments such as immobiliation, icing, " stretching, heel lifts, orthotics, physical therapy, MRI.     Talked about arthritis and treatments including orthotics, injections, icing, NSAIDS, bracing, physical therapy, compounding pain cream, or surgical intervention.    Given the chronicity, recommend MRI. If arthritis could try injection. If tendon injury, may need surgery vs permanent bracing. If negative, may be more related to nerve from previous double hip fracture injury and that is when he first noticed the weakness. Will call with results.      Daisy Luque DPM, Podiatry/Foot and Ankle Surgery    Weight management plan: Patient was referred to their PCP to discuss a diet and exercise plan.    Recommended to Michael Seals to follow up with Primary Care provider regarding elevated blood pressure.        Again, thank you for allowing me to participate in the care of your patient.        Sincerely,        Daisy Luque DPM, Podiatry/Foot and Ankle Surgery

## 2019-09-24 ENCOUNTER — TELEPHONE (OUTPATIENT)
Dept: FAMILY MEDICINE | Facility: CLINIC | Age: 74
End: 2019-09-24

## 2019-09-24 NOTE — TELEPHONE ENCOUNTER
Pt called back     He verified he is aware of dose change with Levothyroxine     Also notified Lisinopril was filled 9/20    He is unsure what else we could have called about    Kendra ABDULLAHI RN

## 2019-09-24 NOTE — TELEPHONE ENCOUNTER
Called patient- unsure of reason for original call from clinic, possibly Lab result call? From labs on 9/11. Is he aware of levothyroxine dose change?     No answer, left VM to return call to clinic.     Karey KRISHNA RN

## 2019-09-24 NOTE — TELEPHONE ENCOUNTER
Reason for Call:  Other call back    Detailed comments: patient is returning a triage call from Dr Jane team, unable to conect this call.  Desireease call patient when available please.    Phone Number Patient can be reached at: Cell number on file:    Telephone Information:   Mobile [phone]       Best Time: anytime  Can we leave a detailed message on this number? YES    Call taken on 9/24/2019 at 10:53 AM by Lara Nieto

## 2019-10-02 ENCOUNTER — HOSPITAL ENCOUNTER (OUTPATIENT)
Facility: CLINIC | Age: 74
Discharge: HOME | End: 2019-10-02
Attending: INTERNAL MEDICINE | Admitting: INTERNAL MEDICINE
Payer: COMMERCIAL

## 2019-10-02 VITALS
DIASTOLIC BLOOD PRESSURE: 67 MMHG | OXYGEN SATURATION: 97 % | RESPIRATION RATE: 42 BRPM | HEART RATE: 53 BPM | SYSTOLIC BLOOD PRESSURE: 98 MMHG

## 2019-10-02 LAB — COLONOSCOPY: NORMAL

## 2019-10-02 PROCEDURE — 88305 TISSUE EXAM BY PATHOLOGIST: CPT | Performed by: INTERNAL MEDICINE

## 2019-10-02 PROCEDURE — 45385 COLONOSCOPY W/LESION REMOVAL: CPT | Mod: PT | Performed by: INTERNAL MEDICINE

## 2019-10-02 PROCEDURE — 25000128 H RX IP 250 OP 636: Performed by: INTERNAL MEDICINE

## 2019-10-02 PROCEDURE — 88305 TISSUE EXAM BY PATHOLOGIST: CPT | Mod: 26 | Performed by: INTERNAL MEDICINE

## 2019-10-02 PROCEDURE — G0500 MOD SEDAT ENDO SERVICE >5YRS: HCPCS | Performed by: INTERNAL MEDICINE

## 2019-10-02 RX ORDER — NALOXONE HYDROCHLORIDE 0.4 MG/ML
.1-.4 INJECTION, SOLUTION INTRAMUSCULAR; INTRAVENOUS; SUBCUTANEOUS
Status: CANCELLED | OUTPATIENT
Start: 2019-10-02 | End: 2019-10-03

## 2019-10-02 RX ORDER — ONDANSETRON 2 MG/ML
4 INJECTION INTRAMUSCULAR; INTRAVENOUS EVERY 6 HOURS PRN
Status: CANCELLED | OUTPATIENT
Start: 2019-10-02

## 2019-10-02 RX ORDER — LIDOCAINE 40 MG/G
CREAM TOPICAL
Status: DISCONTINUED | OUTPATIENT
Start: 2019-10-02 | End: 2019-10-02 | Stop reason: HOSPADM

## 2019-10-02 RX ORDER — FENTANYL CITRATE 50 UG/ML
INJECTION, SOLUTION INTRAMUSCULAR; INTRAVENOUS PRN
Status: DISCONTINUED | OUTPATIENT
Start: 2019-10-02 | End: 2019-10-02 | Stop reason: HOSPADM

## 2019-10-02 RX ORDER — FLUMAZENIL 0.1 MG/ML
0.2 INJECTION, SOLUTION INTRAVENOUS
Status: CANCELLED | OUTPATIENT
Start: 2019-10-02 | End: 2019-10-02

## 2019-10-02 RX ORDER — ONDANSETRON 4 MG/1
4 TABLET, ORALLY DISINTEGRATING ORAL EVERY 6 HOURS PRN
Status: CANCELLED | OUTPATIENT
Start: 2019-10-02

## 2019-10-02 RX ORDER — ONDANSETRON 2 MG/ML
4 INJECTION INTRAMUSCULAR; INTRAVENOUS
Status: DISCONTINUED | OUTPATIENT
Start: 2019-10-02 | End: 2019-10-02 | Stop reason: HOSPADM

## 2019-10-02 NOTE — LETTER
September 18, 2019      Michael Seals  [address]  [place] [address]        Dear Michael,       Thank you for choosing [facility]. You are scheduled for the following service(s).   Please be aware that coverage of these services is subject to the terms and limitations of your health insurance plan.  Call member services at your health plan with any benefit or coverage questions.    Date:  10-2-19             Procedure:  COLONOSCOPY  Doctor:        Dang   Arrival Time:  0830  *Check in at Emergency/Endoscopy desk*  Procedure Time:  0900      Location:   [facility]        Endoscopy Department, First Floor (Enter through ER Doors) *        [address] [place], [place] [id number] [phone] or [phone] ([facility]) to reschedule      MIRALAX -GATORADE  PREP  Colonoscopy is the most accurate test to detect colon polyps and colon cancer; and the only test where polyps can be removed. During this procedure, a doctor examines the lining of your large intestine and rectum through a flexible tube.   Transportation  You must arrange for a ride for the day of your procedure with a responsible adult. A taxi , Uber, etc, is not an option unless you are accompanied by a responsible adult. If you fail to arrange transportation with a responsible adult, your procedure will be cancelled and rescheduled.    Purchase the  following supplies at your local pharmacy:  - 2 (two) bisacodyl tablets: each tablet contains 5 mg.  (Dulcolax  laxative NOT Dulcolax  stool softener)   - 1 (one) 8.3 oz bottle of Polyethylene Glycol (PEG) 3350 Powder   (MiraLAX , Smooth LAX , ClearLAX  or equivalent)  - 64 oz Gatorade    Regular Gatorade, Gatorade G2 , Powerade , Powerade Zero  or Pedialyte  is acceptable. Red colored flavors are not allowed; all other colors (yellow, green, orange, purple and blue) are okay. It is also okay to buy two 2.12 oz packets of powdered Gatorade that can  be mixed with water to a total volume of 64 oz of liquid.  - 1 (one) 10 oz bottle of Magnesium Citrate (Red colored flavors are not allowed)  It is also okay for you to use a 0.5 oz package of powdered magnesium citrate (17 g) mixed with 10 oz of water.      PREPARATION FOR COLONOSCOPY    7 days before:    Discontinue fiber supplements and medications containing iron. This includes Metamucil  and Fibercon ; and multivitamins with iron.    3 days before:    Begin a low-fiber diet. A low-fiber diet helps making the cleanout more effective.     Examples of a low-fiber diet include (but are not limited to): white bread, white rice, pasta, crackers, fish, chicken, eggs, ground beef, creamy peanut butter, cooked/steamed/boiled vegetables, canned fruit, bananas, melons, milk, plain yogurt cheese, salad dressing and other condiments.     The following are not allowed on a low-fiber diet: seeds, nuts, popcorn, bran, whole wheat, corn, quinoa, raw fruits and vegetables, berries and dried fruit, beans and lentils.    For additional details on low-fiber diet, please refer to the table on the last page.    2 days before:    Continue the low-fiber diet.     Drink at least 8 glasses of water throughout the day.     Stop eating solid foods at 11:45 pm.  1.   2. 1 day before:    In the morning: begin a clear liquid diet (liquids you can see through).     Examples of a clear liquid diet include: water, clear broth or bouillon, Gatorade, Pedialyte or Powerade, carbonated and non-carbonated soft drinks (Sprite , 7-Up , ginger ale), strained fruit juices without pulp (apple, white grape, white cranberry), Jell-O  and popsicles.     The following are not allowed on a clear liquid diet: red liquids, alcoholic beverages, dairy products (milk, creamer, and yogurt), protein shakes, creamy broths, juice with pulp and chewing tobacco.    At noon: take 2 (two) bisacodyl tablets     At 4 (and no later than 6pm): start drinking the  Miralax-Gatorade preparation (8.3 oz of Miralax mixed with 64 oz of Gatorade in a large pitcher). Drink 1(one) 8 oz glass every 15 minutes thereafter, until the mixture is gone.    COLON CLEANSING TIPS: drink adequate amounts of fluids before and after your colon cleansing to prevent dehydration. Stay near a toilet because you will have diarrhea. Even if you are sitting on the toilet, continue to drink the cleansing solution every 15 minutes. If you feel nauseous or vomit, rinse your mouth with water, take a 15 to 30-minute-break and then continue drinking the solution. You will be uncomfortable until the stool has flushed from your colon (in about 2 to 4 hours). You may feel chilled.    Day of your procedure  You may take all of your morning medications including blood pressure medications, blood thinners (if you have not been instructed to stop these by our office), methadone, anti-seizure medications with sips of water 3 hours prior to your procedure or earlier. Do not take insulin or vitamins prior to your procedure. Continue the clear liquid diet.       4 hours prior: drink 10 oz of magnesium citrate. It may be easier to drink it with a straw.    STOP consuming all liquids after that.     Do not take anything by mouth during this time.     Allow extra time to travel to your procedure as you may need to stop and use a restroom along the way.    You are ready for the procedure, if you followed all instructions and your stool is no longer formed, but clear or yellow liquid. If you are unsure whether your colon is clean, please call our office at [phone] before you leave for your appointment.    Bring the following to your procedure:  - Insurance Card/Photo ID.   - List of current medications including over-the-counter medications and supplements.   - Your rescue inhaler if you currently use one to control asthma.      Canceling or rescheduling your appointment:   If you must cancel or reschedule your  appointment, please call [phone] as soon as possible.      COLONOSCOPY PRE-PROCEDURE CHECKLIST    If you have diabetes, ask your regular doctor for diet and medication restrictions.  If you take an anticoagulant or anti-platelet medication (such as Coumadin , Lovenox , Pradaxa , Xarelto , Eliquis , etc.), please call your primary doctor for advice on holding this medication.  If you take aspirin you may continue to do so.  If you are or may be pregnant, please discuss the risks and benefits of this procedure with your doctor.        What happens during a colonoscopy?    Plan to spend up to two hours, starting at registration time, at the endoscopy center the day of your procedure. The colonoscopy takes an average of 15 to 30 minutes. Recovery time is about 30 minutes.      Before the exam:    You will change into a gown.    Your medical history and medication list will be reviewed with you, unless that has been done over the phone prior to the procedure.     A nurse will insert an intravenous (IV) line into your hand or arm.    The doctor will meet with you and will give you a consent form to sign.  1. During the exam:     Medicine will be given through the IV line to help you relax.     Your heart rate and oxygen levels will be monitored. If your blood pressure is low, you may be given fluids through the IV line.     The doctor will insert a flexible hollow tube, called a colonoscope, into your rectum. The scope will be advanced slowly through the large intestine (colon).    You may have a feeling of fullness or pressure.     If an abnormal tissue or a polyp is found, the doctor may remove it through the endoscope for closer examination, or biopsy. Tissue removal is painless    After the exam:           Any tissue samples removed during the exam will be sent to a lab for evaluation. It may take 5-7 working days for you to be notified of the results.     A nurse will provide you with complete discharge  instructions before you leave the endoscopy center. Be sure to ask the nurse for specific instructions if you take blood thinners such as Aspirin, Coumadin or Plavix.     The doctor will prepare a full report for you and for the physician who referred you for the procedure.     Your doctor will talk with you about the initial results of your exam.      Medication given during the exam will prohibit you from driving for the rest of the day.     Following the exam, you may resume your normal diet. Your first meal should be light, no greasy foods. Avoid alcohol until the next day.     You may resume your regular activities the day after the procedure.         LOW-FIBER DIET    Foods RECOMMENDED Foods to AVOID   Breads, Cereal, Rice and Pasta:   White bread, rolls, biscuits, croissant and keshav toast.   Waffles, German toast and pancakes.   White rice, noodles, pasta, macaroni and peeled cooked potatoes.   Plain crackers and saltines.   Cooked cereals: farina, cream of rice.   Cold cereals: Puffed Rice , Rice Krispies , Corn Flakes  and Special K    Breads, Cereal, Rice and Pasta:   Breads or rolls with nuts, seeds or fruit.   Whole wheat, pumpernickel, rye breads and cornbread.   Potatoes with skin, brown or wild rice, and kasha (buckwheat).     Vegetables:   Tender cooked and canned vegetables without seeds: carrots, asparagus tips, green or wax beans, pumpkin, spinach, lima beans. Vegetables:   Raw or steamed vegetables.   Vegetables with seeds.   Sauerkraut.   Winter squash, peas, broccoli, Brussel sprouts, cabbage, onions, cauliflower, baked beans, peas and corn.   Fruits:   Strained fruit juice.   Canned fruit, except pineapple.   Ripe bananas and melon. Fruits:   Prunes and prune juice.   Raw fruits.   Dried fruits: figs, dates and raisins.   Milk/Dairy:   Milk: plain or flavored.   Yogurt, custard and ice cream.   Cheese and cottage cheese Milk/Dairy:     Meat and other proteins:   ground, well-cooked tender  beef, lamb, ham, veal, pork, fish, poultry and organ meats.   Eggs.   Peanut butter without nuts. Meat and other proteins:   Tough, fibrous meats with gristle.   Dry beans, peas and lentils.   Peanut butter with nuts.   Tofu.   Fats, Snack, Sweets, Condiments and Beverages:   Margarine, butter, oils, mayonnaise, sour cream and salad dressing, plain gravy.   Sugar, hard candy, clear jelly, honey and syrup.   Spices, cooked herbs, bouillon, broth and soups made with allowed vegetable, ketchup and mustard.   Coffee, tea and carbonated drinks.   Plain cakes, cookies and pretzels.   Gelatin, plain puddings, custard, ice cream, sherbet and popsicles. Fats, Snack, Sweets, Condiments and Beverages:   Nuts, seeds and coconut.   Jam, marmalade and preserves.   Pickles, olives, relish and horseradish.   All desserts containing nuts, seeds, dried fruit and coconut; or made from whole grains or bran.   Candy made with nuts or seeds.   Popcorn.                     DIRECTIONS TO THE ENDOSCOPY DEPARTMENT     From the north ([facility])  Take [address] South, exit on [address] [address]. Get into the left hand apolinar, turn left (east), go one-half mile to [address] and turn left. Go north to the first stoplight, take a right on [address] and follow it to the Emergency entrance.    From the south ([facility])  Take [address]N to the [address]E split and exit on [address] [address]. On [address] [address], turn left (west) to [address]. Turn right (north) on [address]. Go north to the first stoplight, take a right on [address] and follow it to the Emergency entrance.    From the east via 35E ([facility])  Take [address] south to [address] [address] exit. Turn right on [address] [address]. Go west to [address]. Turn right (north) on [address]. Go to the first stoplight, take a right and follow on [address] to the Emergency entrance.    From the east via [address] ([facility])  [address]  to [address]. Turn left (south) on [address] to [address]. Turn left (east) on [address] and follow it to the Emergency entrance.    From the west via [address] ([place], [place])  Take [address] east to [address]. Turn right (south) on [address] to [address]. Turn left (east) on [address] and follow it to the Emergency entrance.

## 2019-10-02 NOTE — H&P
Pre-Endoscopy History and Physical     Michael Seals MRN# [id number]   YOB: 1945 Age: 74 year old     Date of Procedure: 10/2/2019  Primary care provider: Josef Doll  Type of Endoscopy: Colonoscopy with possible biopsy, possible polypectomy  Reason for Procedure: screen  Type of Anesthesia Anticipated: Conscious Sedation    HPI:    Michael is a 74 year old male who will be undergoing the above procedure.      A history and physical has been performed. The patient's medications and allergies have been reviewed. The risks and benefits of the procedure and the sedation options and risks were discussed with the patient.  All questions were answered and informed consent was obtained.      He denies a personal or family history of anesthesia complications or bleeding disorders.     Patient Active Problem List   Diagnosis     GERD (gastroesophageal reflux disease)     S/P PTCA (percutaneous transluminal coronary angioplasty)     Esophageal dysmotility     Chronic renal disease, stage III (H)     Hyperlipidemia with target LDL less than 70     HTN (hypertension)     Impaired renal function     CAD (coronary artery disease)     Abnormal stress test     Hypothyroidism, unspecified type        Past Medical History:   Diagnosis Date     CAD (coronary artery disease)     3/2012 [facility]IAN RCA, 8/2015 Heart cath - previously placed RCA stent patent, 70% mid LAD stenosis, 50% prox CFX, EF 55-60%     CKD (chronic kidney disease) stage 2, GFR 60-89 ml/min     baseline crt 1.20 - 1.60     Esophageal dysmotility      GERD (gastroesophageal reflux disease)      H pylori ulcer      Hip fracture (H) 01/2013    right side - closed fracture - no surgery     HTN (hypertension)      Hyperlipidaemia LDL goal <100      Moderate major depression (H)      S/P appendectomy      S/P hernia repair     left     S/P PTCA (percutaneous transluminal coronary angioplasty) 03/2012    LILLIAN RCA        Past Surgical History:   Procedure  Laterality Date     APPENDECTOMY       CORONARY ANGIOGRAPHY ADULT ORDER  2015    previously placed RCA stent patent, 70% mid LAD stenosis, 50% prox CFX, EF 55-60%     HEART CATH STENT COR W/WO PTCA  3/2012    LILLIAN to RCA      HERNIA REPAIR         Social History     Tobacco Use     Smoking status: Former Smoker     Packs/day: 1.00     Years: 8.00     Pack years: 8.00     Types: Cigarettes     Last attempt to quit: 1970     Years since quittin.7     Smokeless tobacco: Never Used   Substance Use Topics     Alcohol use: Yes     Alcohol/week: 10.0 standard drinks     Types: 10 Standard drinks or equivalent per week     Comment: 1-2 glasses of wine per evening with dinner       Family History   Problem Relation Age of Onset     Cerebrovascular Disease Mother      Cerebrovascular Disease Father      C.A.D. Father      Myocardial Infarction Father      Parkinsonism Brother      Heart Failure Brother      Other - See Comments Brother          in [place]       Prior to Admission medications    Medication Sig Start Date End Date Taking? Authorizing Provider   aspirin (SB LOW DOSE ASA EC) 81 MG EC tablet Take 81 mg by mouth daily.   Yes Reported, Patient   atenolol (TENORMIN) 50 MG tablet Take 0.5 tablets (25 mg) by mouth daily 19  Yes Josef Doll MD   evolocumab (REPATHA) 140 MG/ML prefilled autoinjector Inject 140 mg Subcutaneous every 14 days   Yes Reported, Patient   levothyroxine (SYNTHROID/LEVOTHROID) 75 MCG tablet Take 1 tablet (75 mcg) by mouth daily 9/15/19  Yes Josfe Doll MD   lisinopril (PRINIVIL/ZESTRIL) 2.5 MG tablet TAKE 1 TABLET BY MOUTH ONCE DAILY 19  Yes Josef Doll MD   Multiple Vitamins-Minerals (MULTIVITAMIN PO) Take by mouth daily   Yes Reported, Patient   pitavastatin (LIVALO) 2 MG TABS tablet Take 1 tablet (2 mg) by mouth daily 19  Yes Micaela Greenwood MD   RABEprazole (ACIPHEX) 20 MG EC tablet Take 1 tablet (20 mg) by mouth  "daily 9/11/19  Yes Josef Doll MD   ACE NOT PRESCRIBED, INTENTIONAL, continuous prn Reported on 4/21/2017    Reported, Patient   nitroglycerin (NITROSTAT) 0.4 MG SL tablet Place 1 tablet (0.4 mg) under the tongue every 5 minutes as needed for chest pain 7/29/16   Josef Doll MD   omeprazole-sodium bicarbonate (ZEGERID)  MG capsule Take 1 capsule (40 mg) by mouth daily as needed 9/11/19   Josef Doll MD       Allergies   Allergen Reactions     Atorvastatin      Crestor [Rosuvastatin]      Muscle aches     Pravastatin      Simvastatin      Zetia [Ezetimibe]      Myalgias          REVIEW OF SYSTEMS:   5 point ROS negative except as noted above in HPI, including Gen., Resp., CV, GI &  system review.    PHYSICAL EXAM:   There were no vitals taken for this visit. Estimated body mass index is 29.29 kg/m  as calculated from the following:    Height as of 9/23/19: 1.803 m (5' 11\").    Weight as of 9/23/19: 95.3 kg (210 lb).   GENERAL APPEARANCE: alert, and oriented  MENTAL STATUS: alert  AIRWAY EXAM: Mallampatti Class I (visualization of the soft palate, fauces, uvula, anterior and posterior pillars)  RESP: lungs clear to auscultation - no rales, rhonchi or wheezes  CV: regular rates and rhythm  DIAGNOSTICS:    Not indicated    IMPRESSION   ASA Class 2 - Mild systemic disease    PLAN:   Plan for Colonoscopy with possible biopsy, possible polypectomy. We discussed the risks, benefits and alternatives and the patient wished to proceed.    The above has been forwarded to the consulting provider.      Signed Electronically by: Chris Leong MD  October 2, 2019          "

## 2019-10-02 NOTE — DISCHARGE INSTRUCTIONS
Understanding Colon and Rectal Polyps     The colon has a smooth lining composed of millions of cells.     The colon (also called the large intestine) is a muscular tube that forms the last part of the digestive tract. It absorbs water and stores food waste. The colon is about 4 to 6 feet long. The rectum is the last 6 inches of the colon. The colon and rectum have a smooth lining composed of millions of cells. Changes in these cells can lead to growths in the colon that can become cancerous and should be removed.     When the Colon Lining Changes  Changes that occur in the cells that line the colon or rectum can lead to growths called polyps. Over a period of years, polyps can turn cancerous. Removing polyps early may prevent cancer from ever forming.      Polyps  Polyps are fleshy clumps of tissue that form on the lining of the colon or rectum. Small polyps are usually benign (not cancerous). However, over time, cells in a polyp can change and become cancerous. The larger a polyp grows, the more likely this is to happen. Also, certain types of polyps known as adenomatous polyps are considered premalignant. This means that they will almost always become cancerous if they re not removed.          Cancer  Almost all colorectal cancers start when polyp cells begin growing abnormally. As a cancerous tumor grows, it may involve more and more of the colon or rectum. In time, cancer can also grow beyond the colon or rectum and spread to nearby organs or to glands called lymph nodes. The cells can also travel to other parts of the body. This is known as metastasis. The earlier a cancerous tumor is removed, the better the chance of preventing its spread.        0485-6512 [facility], [address] [place] [address], [place] [address]. All rights reserved. This information is not intended as a substitute for professional medical care. Always follow your healthcare professional's instructions.    Understanding Diverticulosis and  Diverticulitis     Pouches or diverticula usually occur in the lower part of the colon called the sigmoid.      Diverticulitis occurs when the pouches become inflamed.     The colon (large intestine) is the last part of the digestive tract. It absorbs water from stool and changes it from a liquid to a solid. In certain cases, small pouches called diverticula can form in the colon wall. This condition is called diverticulosis. The pouches can become infected. If this happens, it becomes a more serious problem called diverticulitis. These problems can be painful. But they can be managed.   Managing Your Condition  Diet changes or taking medications are often tried first. These may be enough to bring relief. If the case is bad, surgery may be done. You and your doctor can discuss the plan that is best for you.  If You Have Diverticulosis  Diet changes are often enough to control symptoms. The main changes are adding fiber (roughage) and drinking more water. Fiber absorbs water as it travels through your colon. This helps your stool stay soft and move smoothly. Water helps this process. If needed, you may be told to take over-the-counter stool softeners. To help relieve pain, antispasmodic medications may be prescribed.  If You Have Diverticulitis  Treatment depends on how bad your symptoms are.  For mild symptoms: You may be put on a liquid diet for a short time. You may also be prescribed antibiotics. If these two steps relieve your symptoms, you may then be prescribed a high-fiber diet. If you still have symptoms, your doctor will discuss further treatment options with you.  For severe symptoms: You may need to be admitted to the hospital. There, you can be given IV antibiotics and fluids. Once symptoms are under control, the above treatments may be tried. If these don t control your condition, your doctor may discuss the option of having surgery with you.  [place] to Colon Health  Help keep your colon healthy with a  diet that includes plenty of high-fiber fruits, vegetables, and whole grains. Drink plenty of liquids like water and juice. Your doctor may also recommend avoiding seeds and nuts.          [id number] [employer], [address] [place] [address], [place] [address]. All rights reserved. This information is not intended as a substitute for professional medical care. Always follow your healthcare professional's instructions.

## 2019-10-03 LAB — COPATH REPORT: NORMAL

## 2019-11-25 ENCOUNTER — OFFICE VISIT (OUTPATIENT)
Dept: CARDIOLOGY | Facility: CLINIC | Age: 74
End: 2019-11-25
Attending: INTERNAL MEDICINE
Payer: COMMERCIAL

## 2019-11-25 VITALS
HEIGHT: 71 IN | SYSTOLIC BLOOD PRESSURE: 110 MMHG | BODY MASS INDEX: 28.84 KG/M2 | HEART RATE: 60 BPM | WEIGHT: 206 LBS | DIASTOLIC BLOOD PRESSURE: 70 MMHG

## 2019-11-25 DIAGNOSIS — E78.5 HYPERLIPIDEMIA WITH TARGET LDL LESS THAN 70: ICD-10-CM

## 2019-11-25 DIAGNOSIS — E78.2 MIXED HYPERLIPIDEMIA: ICD-10-CM

## 2019-11-25 DIAGNOSIS — I25.10 CORONARY ARTERY DISEASE INVOLVING NATIVE CORONARY ARTERY OF NATIVE HEART WITHOUT ANGINA PECTORIS: ICD-10-CM

## 2019-11-25 PROCEDURE — 99214 OFFICE O/P EST MOD 30 MIN: CPT | Performed by: INTERNAL MEDICINE

## 2019-11-25 ASSESSMENT — MIFFLIN-ST. JEOR: SCORE: 1686.54

## 2019-11-25 NOTE — PROGRESS NOTES
HISTORY OF PRESENT ILLNESS:  Works out at lifetime 45 minutes 4 to 5 x weeks. Tolerating repatha, but off pitavastatin.     Orders this Visit:  No orders of the defined types were placed in this encounter.    No orders of the defined types were placed in this encounter.    There are no discontinued medications.    Encounter Diagnoses   Name Primary?     Coronary artery disease involving native coronary artery of native heart without angina pectoris      Mixed hyperlipidemia      Hyperlipidemia with target LDL less than 70        CURRENT MEDICATIONS:  Current Outpatient Medications   Medication Sig Dispense Refill     aspirin (SB LOW DOSE ASA EC) 81 MG EC tablet Take 81 mg by mouth daily.       atenolol (TENORMIN) 50 MG tablet Take 0.5 tablets (25 mg) by mouth daily 45 tablet 3     evolocumab (REPATHA) 140 MG/ML prefilled autoinjector Inject 140 mg Subcutaneous every 14 days       levothyroxine (SYNTHROID/LEVOTHROID) 75 MCG tablet Take 1 tablet (75 mcg) by mouth daily 90 tablet 3     lisinopril (PRINIVIL/ZESTRIL) 2.5 MG tablet TAKE 1 TABLET BY MOUTH ONCE DAILY 90 tablet 3     Multiple Vitamins-Minerals (MULTIVITAMIN PO) Take by mouth daily       nitroglycerin (NITROSTAT) 0.4 MG SL tablet Place 1 tablet (0.4 mg) under the tongue every 5 minutes as needed for chest pain 90 tablet 3     omeprazole-sodium bicarbonate (ZEGERID)  MG capsule Take 1 capsule (40 mg) by mouth daily as needed 30 capsule 3     RABEprazole (ACIPHEX) 20 MG EC tablet Take 1 tablet (20 mg) by mouth daily 90 tablet 3     ACE NOT PRESCRIBED, INTENTIONAL, continuous prn Reported on 4/21/2017       pitavastatin (LIVALO) 2 MG TABS tablet Take 1 tablet (2 mg) by mouth daily (Patient not taking: Reported on 11/25/2019) 90 tablet 3       ALLERGIES     Allergies   Allergen Reactions     Atorvastatin      Crestor [Rosuvastatin]      Muscle aches     Pravastatin      Simvastatin      Zetia [Ezetimibe]      Myalgias         PAST MEDICAL, SURGICAL,  "FAMILY, SOCIAL HISTORY:  History was reviewed and updated as needed, see medical record.    Review of Systems:  A 12-point review of systems was completed, see medical record for detailed review of systems information.    Physical Exam:  Vitals: /70   Pulse 60   Ht 1.803 m (5' 11\")   Wt 93.4 kg (206 lb)   BMI 28.73 kg/m      Constitutional:           Skin:           Head:           Eyes:           ENT:           Neck:           Chest:           Cardiac:                    Abdomen:           Vascular:                                        Extremities and Back:           Neurological:           ASSESSMENT:  Very stable.       RECOMMENDATIONS:   1) continue present meds  2) Follow-up in one year or prn depending upon preference      Recent Lab Results:  LIPID RESULTS:  Lab Results   Component Value Date    CHOL 113 05/16/2019    HDL 55 05/16/2019    LDL 32 05/16/2019    TRIG 132 05/16/2019    CHOLHDLRATIO 6.9 (H) 03/03/2015       LIVER ENZYME RESULTS:  Lab Results   Component Value Date    AST 22 09/11/2019    ALT 38 09/11/2019       CBC RESULTS:  Lab Results   Component Value Date    WBC 5.3 08/27/2018    RBC 4.42 08/27/2018    HGB 14.6 08/27/2018    HCT 42.7 08/27/2018    MCV 97 08/27/2018    MCH 33.0 08/27/2018    MCHC 34.2 08/27/2018    RDW 12.5 08/27/2018     08/27/2018       BMP RESULTS:  Lab Results   Component Value Date     09/11/2019    POTASSIUM 5.1 09/11/2019    CHLORIDE 107 09/11/2019    CO2 24 09/11/2019    ANIONGAP 7 09/11/2019    GLC 98 09/11/2019    BUN 23 09/11/2019    CR 1.35 (H) 09/11/2019    GFRESTIMATED 51 (L) 09/11/2019    GFRESTBLACK 59 (L) 09/11/2019    JIMMY 9.2 09/11/2019        A1C RESULTS:  Lab Results   Component Value Date    A1C 5.2 08/18/2017       INR RESULTS:  Lab Results   Component Value Date    INR 1.00 03/06/2012       We greatly appreciate the opportunity to be involved in the care of your patient, Michael Seals.    Sincerely,  Del Betancourt, " MD      CC  Del Betancourt MD  [address]  [place] [id number]

## 2019-11-25 NOTE — LETTER
11/25/2019    Josfe Doll MD, MD  [address] [facility] [address]  [facility] [place] [address]    RE: Michael Seals       Dear Colleague,    I had the pleasure of seeing Michael Seals in the [facility].    HISTORY OF PRESENT ILLNESS:  Works out at lifetime 45 minutes 4 to 5 x weeks. Tolerating repatha, but off pitavastatin.     Orders this Visit:  No orders of the defined types were placed in this encounter.    No orders of the defined types were placed in this encounter.    There are no discontinued medications.    Encounter Diagnoses   Name Primary?     Coronary artery disease involving native coronary artery of native heart without angina pectoris      Mixed hyperlipidemia      Hyperlipidemia with target LDL less than 70        CURRENT MEDICATIONS:  Current Outpatient Medications   Medication Sig Dispense Refill     aspirin (SB LOW DOSE ASA EC) 81 MG EC tablet Take 81 mg by mouth daily.       atenolol (TENORMIN) 50 MG tablet Take 0.5 tablets (25 mg) by mouth daily 45 tablet 3     evolocumab (REPATHA) 140 MG/ML prefilled autoinjector Inject 140 mg Subcutaneous every 14 days       levothyroxine (SYNTHROID/LEVOTHROID) 75 MCG tablet Take 1 tablet (75 mcg) by mouth daily 90 tablet 3     lisinopril (PRINIVIL/ZESTRIL) 2.5 MG tablet TAKE 1 TABLET BY MOUTH ONCE DAILY 90 tablet 3     Multiple Vitamins-Minerals (MULTIVITAMIN PO) Take by mouth daily       nitroglycerin (NITROSTAT) 0.4 MG SL tablet Place 1 tablet (0.4 mg) under the tongue every 5 minutes as needed for chest pain 90 tablet 3     omeprazole-sodium bicarbonate (ZEGERID)  MG capsule Take 1 capsule (40 mg) by mouth daily as needed 30 capsule 3     RABEprazole (ACIPHEX) 20 MG EC tablet Take 1 tablet (20 mg) by mouth daily 90 tablet 3     ACE NOT PRESCRIBED, INTENTIONAL, continuous prn Reported on 4/21/2017       pitavastatin (LIVALO) 2 MG TABS tablet Take 1 tablet (2 mg) by mouth daily (Patient not taking: Reported on 11/25/2019) 90  "tablet 3       ALLERGIES     Allergies   Allergen Reactions     Atorvastatin      Crestor [Rosuvastatin]      Muscle aches     Pravastatin      Simvastatin      Zetia [Ezetimibe]      Myalgias         PAST MEDICAL, SURGICAL, FAMILY, SOCIAL HISTORY:  History was reviewed and updated as needed, see medical record.    Review of Systems:  A 12-point review of systems was completed, see medical record for detailed review of systems information.    Physical Exam:  Vitals: /70   Pulse 60   Ht 1.803 m (5' 11\")   Wt 93.4 kg (206 lb)   BMI 28.73 kg/m       Constitutional:           Skin:           Head:           Eyes:           ENT:           Neck:           Chest:           Cardiac:                    Abdomen:           Vascular:                                        Extremities and Back:           Neurological:           ASSESSMENT:  Very stable.       RECOMMENDATIONS:   1) continue present meds  2) Follow-up in one year or prn depending upon preference      Recent Lab Results:  LIPID RESULTS:  Lab Results   Component Value Date    CHOL 113 05/16/2019    HDL 55 05/16/2019    LDL 32 05/16/2019    TRIG 132 05/16/2019    CHOLHDLRATIO 6.9 (H) 03/03/2015       LIVER ENZYME RESULTS:  Lab Results   Component Value Date    AST 22 09/11/2019    ALT 38 09/11/2019       CBC RESULTS:  Lab Results   Component Value Date    WBC 5.3 08/27/2018    RBC 4.42 08/27/2018    HGB 14.6 08/27/2018    HCT 42.7 08/27/2018    MCV 97 08/27/2018    MCH 33.0 08/27/2018    MCHC 34.2 08/27/2018    RDW 12.5 08/27/2018     08/27/2018       BMP RESULTS:  Lab Results   Component Value Date     09/11/2019    POTASSIUM 5.1 09/11/2019    CHLORIDE 107 09/11/2019    CO2 24 09/11/2019    ANIONGAP 7 09/11/2019    GLC 98 09/11/2019    BUN 23 09/11/2019    CR 1.35 (H) 09/11/2019    GFRESTIMATED 51 (L) 09/11/2019    GFRESTBLACK 59 (L) 09/11/2019    JIMMY 9.2 09/11/2019        A1C RESULTS:  Lab Results   Component Value Date    A1C 5.2 08/18/2017 "       INR RESULTS:  Lab Results   Component Value Date    INR 1.00 03/06/2012       We greatly appreciate the opportunity to be involved in the care of your patient, Michael Seals.    Sincerely,  Del Betancourt MD      CC  Del Betancourt MD  [address]  [place] [id number]                                                                       Thank you for allowing me to participate in the care of your patient.      Sincerely,     Del Betancourt MD     [facility]    cc:   Del Betancourt MD  [address]  [place] [id number]

## 2019-11-25 NOTE — LETTER
2019      Josef Doll MD, MD  [address] [facility] [address]  [facility] [address]      RE: Michael Seals       Dear Colleague,    I had the pleasure of seeing Michael Seals in the [facility].    Service Date: 2019      Josef Doll MD   [facility]    [address] [place] [address]       RE: Michael Seals    MRN: [id number]   : 1945      Dear Dr. Doll:       Michael Seals, a 74-year-old man with coronary artery disease, hypertension and hypercholesterolemia (statin intolerant), was seen today at your request for followup.      Since last seen, the patient is entirely asymptomatic.  In particular, he denies chest, arm, neck, jaw or back discomfort with exertion, dyspnea, syncope or side effects from his medications.  He exercises at [employer] 45 minutes 4-5 times a week.  He follows a Mediterranean-style diet.      PAST MEDICAL HISTORY:   1.  Hypercholesterolemia:  Intolerant of all statin drugs.  Currently on PCSK9 inhibitor with excellent response.  Most recent LDL cholesterol of 32.  Recently tried a trial of pitavastatin, but failed because of muscle aches.   2.  Hypertension.   3.  Coronary artery disease.   a.  History of stent implantation in proximal right coronary in  for new onset angina.   b.  Repeat angiography in  showing continued patency at stent site with moderate narrowing in the LAD and circumflex.      PHYSICAL EXAMINATION:   GENERAL:  Exam today demonstrates a very pleasant, cooperative and fit, 74-year-old man.   VITAL SIGNS:  His blood pressure is 110/70.  His heart rate is 60.   His BMI is 28.7.   LUNGS:  Clear to percussion and auscultation.   CARDIOVASCULAR:  Normal S1 with a normal S2.  There is no S3.  There is no murmur, rub or click.      LABORATORY STUDIES:  His most recent lipid profile in 2019 showed total cholesterol of 113, HDL of 55, LDL of 32, triglycerides of 132.      ASSESSMENT:   Mr. Seals is asymptomatic on guideline-directed medical therapy with optimal systolic blood pressure and LDL cholesterol levels.  The patient is truly intolerant of all statin drugs.  He has had an excellent response to PCSK9 inhibitors.      The patient and I discussed the benefits of long-term followup in our clinic.  He has been very stable for many years, and I have made no recent change in his medical therapy.  I am very happy to keep following him if you and the patient desire.  Otherwise, I could see the patient on a p.r.n. basis.      RECOMMENDATIONS:   1.  Continue aggressive lifestyle intervention.   2.  Continue present medical therapy.   3.  We have made a followup visit in 1 year should you and the patient prefer followup in our clinic.  Otherwise, he could follow up in your clinic and see me on a p.r.n. basis.      We greatly appreciate the opportunity to see your patient Mr. Michael Seals.      Sincerely,      MD NARINDER Trinidad MD             D: 2019   T: 2019   MT: troy      Name:     MICHAEL SEALS   MRN:      [id number]        Account:      [id number]   :      1945           Service Date: 2019      Document: [id number]           Outpatient Encounter Medications as of 2019   Medication Sig Dispense Refill     aspirin (SB LOW DOSE ASA EC) 81 MG EC tablet Take 81 mg by mouth daily.       atenolol (TENORMIN) 50 MG tablet Take 0.5 tablets (25 mg) by mouth daily 45 tablet 3     evolocumab (REPATHA) 140 MG/ML prefilled autoinjector Inject 140 mg Subcutaneous every 14 days       levothyroxine (SYNTHROID/LEVOTHROID) 75 MCG tablet Take 1 tablet (75 mcg) by mouth daily 90 tablet 3     lisinopril (PRINIVIL/ZESTRIL) 2.5 MG tablet TAKE 1 TABLET BY MOUTH ONCE DAILY 90 tablet 3     Multiple Vitamins-Minerals (MULTIVITAMIN PO) Take by mouth daily       nitroglycerin (NITROSTAT) 0.4 MG SL tablet Place 1 tablet (0.4 mg) under the tongue every 5 minutes as  needed for chest pain 90 tablet 3     omeprazole-sodium bicarbonate (ZEGERID)  MG capsule Take 1 capsule (40 mg) by mouth daily as needed 30 capsule 3     RABEprazole (ACIPHEX) 20 MG EC tablet Take 1 tablet (20 mg) by mouth daily 90 tablet 3     ACE NOT PRESCRIBED, INTENTIONAL, continuous prn Reported on 4/21/2017       pitavastatin (LIVALO) 2 MG TABS tablet Take 1 tablet (2 mg) by mouth daily (Patient not taking: Reported on 11/25/2019) 90 tablet 3     No facility-administered encounter medications on file as of 11/25/2019.        Again, thank you for allowing me to participate in the care of your patient.      Sincerely,    Del Betancourt MD     [facility]

## 2019-11-25 NOTE — PROGRESS NOTES
Service Date: 2019      [place] [address]       RE: Michael Seals    MRN: [id number]   : 1945      Dear Dr. Doll:       Michael Seals, a 74-year-old man with coronary artery disease, hypertension and hypercholesterolemia (statin intolerant), was seen today at your request for followup.      Since last seen, the patient is entirely asymptomatic.  In particular, he denies chest, arm, neck, jaw or back discomfort with exertion, dyspnea, syncope or side effects from his medications.  He exercises at [employer] 45 minutes 4-5 times a week.  He follows a Mediterranean-style diet.      PAST MEDICAL HISTORY:   1.  Hypercholesterolemia:  Intolerant of all statin drugs.  Currently on PCSK9 inhibitor with excellent response.  Most recent LDL cholesterol of 32.  Recently tried a trial of pitavastatin, but failed because of muscle aches.   2.  Hypertension.   3.  Coronary artery disease.   a.  History of stent implantation in proximal right coronary in  for new onset angina.   b.  Repeat angiography in  showing continued patency at stent site with moderate narrowing in the LAD and circumflex.      PHYSICAL EXAMINATION:   GENERAL:  Exam today demonstrates a very pleasant, cooperative and fit, 74-year-old man.   VITAL SIGNS:  His blood pressure is 110/70.  His heart rate is 60.   His BMI is 28.7.   LUNGS:  Clear to percussion and auscultation.   CARDIOVASCULAR:  Normal S1 with a normal S2.  There is no S3.  There is no murmur, rub or click.      LABORATORY STUDIES:  His most recent lipid profile in 2019 showed total cholesterol of 113, HDL of 55, LDL of 32, triglycerides of 132.      ASSESSMENT:  Mr. Seals is asymptomatic on guideline-directed medical therapy with optimal systolic blood pressure and LDL cholesterol levels.  The patient is truly intolerant of all statin drugs.  He has had an excellent response to PCSK9  inhibitors.      The patient and I discussed the benefits of long-term followup in our clinic.  He has been very stable for many years, and I have made no recent change in his medical therapy.  I am very happy to keep following him if you and the patient desire.  Otherwise, I could see the patient on a p.r.n. basis.      RECOMMENDATIONS:   1.  Continue aggressive lifestyle intervention.   2.  Continue present medical therapy.   3.  We have made a followup visit in 1 year should you and the patient prefer followup in our clinic.  Otherwise, he could follow up in your clinic and see me on a p.r.n. basis.      We greatly appreciate the opportunity to see your patient Mr. Michael Seals.      Sincerely,      MD NARINDER Trinidad MD             D: 2019   T: 2019   MT: troy      Name:     MICHAEL SEALS   MRN:      [id number]        Account:      [id number]   :      1945           Service Date: 2019      Document: [id number]

## 2019-11-26 ENCOUNTER — OFFICE VISIT (OUTPATIENT)
Dept: OTHER | Facility: CLINIC | Age: 74
End: 2019-11-26
Attending: INTERNAL MEDICINE
Payer: COMMERCIAL

## 2019-11-26 VITALS
BODY MASS INDEX: 28.98 KG/M2 | HEART RATE: 58 BPM | DIASTOLIC BLOOD PRESSURE: 72 MMHG | RESPIRATION RATE: 14 BRPM | WEIGHT: 207 LBS | OXYGEN SATURATION: 95 % | HEIGHT: 71 IN | SYSTOLIC BLOOD PRESSURE: 131 MMHG

## 2019-11-26 DIAGNOSIS — E55.9 VITAMIN D DEFICIENCY: ICD-10-CM

## 2019-11-26 DIAGNOSIS — E03.9 HYPOTHYROIDISM, UNSPECIFIED TYPE: ICD-10-CM

## 2019-11-26 DIAGNOSIS — E78.49 FAMILIAL HYPERLIPIDEMIA: Primary | ICD-10-CM

## 2019-11-26 DIAGNOSIS — I25.10 CORONARY ARTERY DISEASE INVOLVING NATIVE CORONARY ARTERY OF NATIVE HEART WITHOUT ANGINA PECTORIS: ICD-10-CM

## 2019-11-26 DIAGNOSIS — Z82.49 FAMILY HISTORY OF PREMATURE CAD: ICD-10-CM

## 2019-11-26 PROCEDURE — 99214 OFFICE O/P EST MOD 30 MIN: CPT | Mod: ZP | Performed by: INTERNAL MEDICINE

## 2019-11-26 PROCEDURE — G0463 HOSPITAL OUTPT CLINIC VISIT: HCPCS

## 2019-11-26 ASSESSMENT — MIFFLIN-ST. JEOR: SCORE: 1691.08

## 2019-11-26 NOTE — PROGRESS NOTES
"Michael Seals is a 74 year old male who presents for:  Chief Complaint   Patient presents with     Nurse Visit     follow up *[id number]        Vitals:    Vitals:    11/26/19 1120   BP: 131/72   BP Location: Right arm   Patient Position: Chair   Cuff Size: Adult Regular   Pulse: 58   Resp: 14   SpO2: 95%   Weight: 207 lb (93.9 kg)   Height: 5' 11\" (1.803 m)       BMI:  Estimated body mass index is 28.87 kg/m  as calculated from the following:    Height as of this encounter: 5' 11\" (1.803 m).    Weight as of this encounter: 207 lb (93.9 kg).    Pain Score:  Data Unavailable        Tianna Kendall CMA    "

## 2019-11-26 NOTE — PATIENT INSTRUCTIONS
Go for thyroid, fasting lipid labs    Continue current meds    Once thyroid is normal range may consider lowest dose of Livalo at 1 mg daily     See me in in  4 months

## 2019-11-26 NOTE — PROGRESS NOTES
[facility] FOLLOW UP VISIT    PRIMARY HEALTH CARE PROVIDER:  Josef Doll MD      REASON FOR VISIT : Here for follow up visit  for management of  Familial heterozygous Hyperlipidemia with statin intolerence ( tried 5 statins and zetia in the past), initially tolerated then aches with low dose Livalo stopped.  family Hx of father early CAD age 52 and both  Parents Hx of stroke.  He is responding well       HPI: Michael Seals is a 74 year old very pleasant male with past medical history of coronary artery disease underwent drug-eluting stent to RCA in March 2012, he also has a 70% mid LAD stenosis and circumflex 50% and recent EF 55-60%, hypertension, strong family history of early coronary disease father age 52 MI and also both parents with history of stroke, GERD, heterozygous familial hyperlipidemia with off treatment LDL was greater than 225 on many occasions and total cholesterol was greater than 300 on few occasions, intolerant to multiple statins he tried atorvastatin, simvastatin, pravastatin, rosuvastatin, Livalo and also Zetia.  All of them caused myalgias and he describes pain mostly in the right calf and left shoulder blade area.   Tolerating Repatha doing well.  Reviewed cardiac studies, laboratory data in the epic    On March 1, 2019  TSH was elevated at 9.1 now treated with levothyroxine  Total cholesterol was 358, triglycerides 257, , non-, HDL 52 (these labs were done off of lipid-lowering medications)  Lipoprotein a was normal  Vitamin D low normal  CPK was normal  CRP 0.8 normal  Apolipoprotein B 194 which is very high    516/2019   While taking repatha and Livalo excellent lipids and LDL 32 and HDL 55.    Last visit  I have initiated levothyroxine 50 mcg daily and increased to 75 mcg recently  tolerating without any problems      PAST MEDICAL HISTORY  Past Medical History:   Diagnosis Date     CAD (coronary artery disease)     3/2012 LILLIAN RCA,  8/2015 Heart cath - previously placed RCA stent patent, 70% mid LAD stenosis, 50% prox CFX, EF 55-60%     CKD (chronic kidney disease) stage 2, GFR 60-89 ml/min     baseline crt 1.20 - 1.60     Esophageal dysmotility      GERD (gastroesophageal reflux disease)      H pylori ulcer      Hip fracture (H) 01/2013    right side - closed fracture - no surgery     HTN (hypertension)      Hyperlipidaemia LDL goal <100      Moderate major depression (H)      S/P appendectomy      S/P hernia repair     left     S/P PTCA (percutaneous transluminal coronary angioplasty) 03/2012    LILLIAN RCA       CURRENT MEDICATIONS  ACE NOT PRESCRIBED, INTENTIONAL,, continuous prn Reported on 4/21/2017  aspirin (SB LOW DOSE ASA EC) 81 MG EC tablet, Take 81 mg by mouth daily.  atenolol (TENORMIN) 50 MG tablet, Take 0.5 tablets (25 mg) by mouth daily  evolocumab (REPATHA) 140 MG/ML prefilled autoinjector, Inject 140 mg Subcutaneous every 14 days  levothyroxine (SYNTHROID/LEVOTHROID) 75 MCG tablet, Take 1 tablet (75 mcg) by mouth daily  lisinopril (PRINIVIL/ZESTRIL) 2.5 MG tablet, TAKE 1 TABLET BY MOUTH ONCE DAILY  Multiple Vitamins-Minerals (MULTIVITAMIN PO), Take by mouth daily  nitroglycerin (NITROSTAT) 0.4 MG SL tablet, Place 1 tablet (0.4 mg) under the tongue every 5 minutes as needed for chest pain  omeprazole-sodium bicarbonate (ZEGERID)  MG capsule, Take 1 capsule (40 mg) by mouth daily as needed  RABEprazole (ACIPHEX) 20 MG EC tablet, Take 1 tablet (20 mg) by mouth daily  pitavastatin (LIVALO) 2 MG TABS tablet, Take 1 tablet (2 mg) by mouth daily (Patient not taking: Reported on 11/25/2019)    No current facility-administered medications on file prior to visit.       PREVIOUSLY TRIED LIPID LOWERING  MEDICATIONS  Statin - Yes:   Simvastatin  Atorvastatin  Rosuvastatin  Pitavastatin  Pravastatin    INTOLERANCE TO LIPID MEDICATIONS  5 statins listed above  Yes: Zetia - Yes    PAST SURGICAL HISTORY:  Past Surgical History:   Procedure  Laterality Date     APPENDECTOMY       CORONARY ANGIOGRAPHY ADULT ORDER  2015    previously placed RCA stent patent, 70% mid LAD stenosis, 50% prox CFX, EF 55-60%     HEART CATH STENT COR W/WO PTCA  3/2012    LILLIAN to RCA      HERNIA REPAIR         ALLERGIES     Allergies   Allergen Reactions     Atorvastatin      Crestor [Rosuvastatin]      Muscle aches     Pravastatin      Simvastatin      Zetia [Ezetimibe]      Myalgias         FAMILY HISTORY  Family History   Problem Relation Age of Onset     Cerebrovascular Disease Mother      Cerebrovascular Disease Father      C.A.D. Father      Myocardial Infarction Father      Parkinsonism Brother      Heart Failure Brother      Other - See Comments Brother          in [place]       CARDIOVASCULAR RISK FACTORS  1. Diabetes: No  2. Smoking: quit smoking some time ago.  3. HTN: fair control  4.Family history of Familial Hyperlipidemia Yes or Familial Hypertriglyceridemia Yes  5.Metabolic syndrome Yes  6. Russell Risk Calculator: NA secondary prevention  7. 2013 GUIDELINES AHA RISK: >7.5 ( NA known CAD)  8. Age: 73 year old  9. Postmenopausal N/A  10.Prediabetes No  11. BMI: Body mass index is 28.87 kg/m .  12.SEX: male  13. CKD: No    SOCIAL HISTORY  Social History     Socioeconomic History     Marital status:      Spouse name: Not on file     Number of children: Not on file     Years of education: Not on file     Highest education level: Not on file   Occupational History     Not on file   Social Needs     Financial resource strain: Not on file     Food insecurity:     Worry: Not on file     Inability: Not on file     Transportation needs:     Medical: Not on file     Non-medical: Not on file   Tobacco Use     Smoking status: Former Smoker     Packs/day: 1.00     Years: 8.00     Pack years: 8.00     Types: Cigarettes     Last attempt to quit: 1970     Years since quittin.9     Smokeless tobacco: Never Used   Substance and Sexual Activity     Alcohol  use: Yes     Alcohol/week: 10.0 standard drinks     Types: 10 Standard drinks or equivalent per week     Comment: 1-2 glasses of wine per evening with dinner     Drug use: No     Sexual activity: Yes     Partners: Female   Lifestyle     Physical activity:     Days per week: Not on file     Minutes per session: Not on file     Stress: Not on file   Relationships     Social connections:     Talks on phone: Not on file     Gets together: Not on file     Attends Yarsani service: Not on file     Active member of club or organization: Not on file     Attends meetings of clubs or organizations: Not on file     Relationship status: Not on file     Intimate partner violence:     Fear of current or ex partner: Not on file     Emotionally abused: Not on file     Physically abused: Not on file     Forced sexual activity: Not on file   Other Topics Concern      Service Not Asked     Blood Transfusions Not Asked     Caffeine Concern Yes     Comment: 3-4 cups coffee per day     Occupational Exposure Not Asked     Hobby Hazards Not Asked     Sleep Concern Yes     Comment: off and on     Stress Concern Not Asked     Weight Concern Yes     Special Diet No     Back Care Not Asked     Exercise Yes     Comment: walking 5 days week, 2.5 miles, biking occasionally     Bike Helmet Not Asked     Seat Belt Not Asked     Self-Exams Not Asked     Parent/sibling w/ CABG, MI or angioplasty before 65F 55M? Not Asked   Social History Narrative     Not on file       ROS:   General: No change in weight, sleep or appetite.  Normal energy.  No fever or chills  Eyes: Negative for vision changes or eye problems  ENT: No problems with ears, nose or throat.  No difficulty swallowing.  Resp: No coughing, wheezing or shortness of breath  CV: No chest pains or palpitations  GI: No nausea, vomiting,  heartburn, abdominal pain, diarrhea, constipation or change in bowel habits  : No urinary frequency or dysuria, bladder or kidney  "problems  Musculoskeletal: No significant muscle or joint pains  Neurologic: No headaches, numbness, tingling, weakness, problems with balance or coordination  Psychiatric: No problems with anxiety, depression or mental health  Heme/immune/allergy: No history of bleeding or clotting problems or anemia.  No allergies or immune system problems  Endocrine: No history of thyroid disease, diabetes or other endocrine disorders  Skin: No rashes,worrisome lesions or skin problems  Vascular:  No claudication, lifestyle limiting or otherwise; no ischemic rest pain; no non-healing ulcers. No weakness, No loss of sensation      BASELINE MYALGIAS    None now off of all lipid meds, (previous statin related myalgias are severe right calf pain and left shoulder blade pain)    EXAM:  /72 (BP Location: Right arm, Patient Position: Chair, Cuff Size: Adult Regular)   Pulse 58   Resp 14   Ht 5' 11\" (1.803 m)   Wt 207 lb (93.9 kg)   SpO2 95%   BMI 28.87 kg/m    In general, the patient is a pleasant male in no apparent distress.    HEENT: NC/AT.  PERRLA.  EOMI.  Sclerae white, not injected.  Nares clear.  Pharynx without erythema or exudate.  Dentition intact.    Neck: No adenopathy.  No thyromegaly. Carotids +2/2 bilaterally without bruits.  No jugular venous distension.   Heart: RRR. Normal S1, S2 splits physiologically. No murmur, rub, click, or gallop. The PMI is in the 5th ICS in the midclavicular line. There is no heave.    Lungs: CTA.  No ronchi, wheezes, rales.  No dullness to percussion.   Abdomen: Soft, nontender, nondistended. No organomegaly. No AAA.  No bruits.   Extremities: No clubbing, cyanosis, or edema.  No wounds.     No Xanthelasma, but  arcus senilis noted. No Tendon Xanthomas, No Eruptive Xanthomas, No palmar crease abnormalities        Labs:  LIPID RESULTS:  Lab Results   Component Value Date    CHOL 113 05/16/2019    CHOL 358 (H) 03/01/2019    CHOL 184 08/27/2018    HDL 55 05/16/2019    HDL 52 " 03/01/2019    HDL 57 08/27/2018    LDL 32 05/16/2019     (H) 03/01/2019    LDL 87 08/27/2018    TRIG 132 05/16/2019    TRIG 257 (H) 03/01/2019    TRIG 198 (H) 08/27/2018    CHOLHDLRATIO 6.9 (H) 03/03/2015    CHOLHDLRATIO 4.6 07/01/2013       ADVANCED LIPID TESTS: No    LIVER ENZYME RESULTS:  Lab Results   Component Value Date    AST 22 09/11/2019    ALT 38 09/11/2019       CBC RESULTS:  Lab Results   Component Value Date    WBC 5.3 08/27/2018    RBC 4.42 08/27/2018    HGB 14.6 08/27/2018    HCT 42.7 08/27/2018    MCV 97 08/27/2018    MCH 33.0 08/27/2018    MCHC 34.2 08/27/2018    RDW 12.5 08/27/2018     08/27/2018       BMP RESULTS:  Lab Results   Component Value Date     09/11/2019    POTASSIUM 5.1 09/11/2019    CHLORIDE 107 09/11/2019    CO2 24 09/11/2019    ANIONGAP 7 09/11/2019    GLC 98 09/11/2019    BUN 23 09/11/2019    CR 1.35 (H) 09/11/2019    GFRESTIMATED 51 (L) 09/11/2019    GFRESTBLACK 59 (L) 09/11/2019    JIMMY 9.2 09/11/2019        A1C RESULTS:  Lab Results   Component Value Date    A1C 5.2 08/18/2017           CARDIAC STUDIES: Yes    ECHO nuc study and cath etc    Reviewed all recent coronary studies, echo etc.      Assessment and Plan:     1. Familial hyperlipidemia ( Heterozygous FH with baseline untreated LDL > 250 on many occasions , TC was 358 , apolipoprotein B very high 194)    This is a very pleasant male with strong family history of early CAD, intolerance to multiple statins and Zetia, known history of coronary artery disease 2-3 vessel disease status post stent placement in RCA, hypertension recently tried PC SK 9 inhibitor Repatha which was successful and significant improvement in LDL.      Restarted repatha  now excellent LDL 32.    Recent vitamin D low normal, CPK normal, lipoprotein a normal, CRP 0.8.  Getting RX for hypothyroidism    At present my recommendations,    1.  Continue levothyroxine 75 mcg daily  2.  Once TSH normal damien try again lowest dose of pitavastatin  1 mg daily   3.  Continue therapeutic lifestyle modifications  4.  Continue  Repatha twice a month ( medically necessary)  5.  Fasting lipid profile in 6 months  6.  Coenzyme Q 10 200 mg daily  7.  Vitamin D 3-2000 units daily          2. Statin intolerance  Only statin he tolerated was livalo 2 mg daily for a while now stopped due to aches.          3. Coronary artery disease involving native coronary artery of native heart without angina pectoris, 2 v disease hx of RCA stent and modertae LAD lesion)      Asymptomatic managed by cardiologist Dr. Betancourt continue the same.  He is on appropriate medications        4. Benign essential hypertension  Well-controlled with low dose lisinopril, beta-blocker continue the same      5. Family history of premature CAD, father age 52 MI   As delineated above, aggressively control risk factors.    6. Vitamin D deficiency  Low normal vitamin D at 24  He will benefit with supplementation , continue  2000 units daily      This note was dictated by utilizing dragon software    Thank you for the consultation   I will Keep you posted    Copy of this dictation to primary care physician Dr. Banda, primary cardiologist Dr. Betancourt    Total patient care time spent today more than 40 minutes face-to-face, more than 50% of the time spent counseling of the above-mentioned multiple issues.  Reviewed recent laboratory data with the patient    Micaela Greenwood MD, BARBARA, FS,LA  Vascular Medicine   Clinical Lipidologist

## 2020-01-28 ENCOUNTER — TELEPHONE (OUTPATIENT)
Dept: OTHER | Facility: CLINIC | Age: 75
End: 2020-01-28

## 2020-01-28 DIAGNOSIS — E78.5 HYPERLIPIDEMIA WITH TARGET LDL LESS THAN 70: Primary | ICD-10-CM

## 2020-01-28 NOTE — TELEPHONE ENCOUNTER
PA Initiation    Medication: Repatha-Free Drug David Renewal  Insurance Company: [employer] - Phone [phone] Fax [phone]  Pharmacy Filling the Rx:    Filling Pharmacy Phone:    Filling Pharmacy Fax:    Start Date: 1/28/2020    Looks like Repatha needs PA.     [employer] forms were faxed in today.

## 2020-01-29 NOTE — TELEPHONE ENCOUNTER
Received a fax asking this question as well. Faxed this information back in and this should be approved.

## 2020-01-29 NOTE — TELEPHONE ENCOUNTER
[facility] - [facility]   Phone: [phone] opt 3  Reference Request #: [id number]    Needs to know if patient has shown clinical benefit,    Please Tawana call to verify whether or not Repatha is providing clinical benefit so she can approve renewal.

## 2020-01-30 NOTE — TELEPHONE ENCOUNTER
FYI, we received an [place] david for him as well but patient has a commercial plan so he can get this with a copay card through the pharmacy.           Prior Authorization Approval    Authorization Effective Date: 1/30/2020  Authorization Expiration Date: 1/30/2021  Medication: Repatha-Free Drug David Renewal  Approved Dose/Quantity: 2/28ds  Reference #:   N/A  Insurance Company: [employer] [place] - Phone [phone] Fax [phone]  Expected CoPay: $454     CoPay Card Available: Yes    Foundation Assistance Needed:  N/A  Which Pharmacy is filling the prescription (Not needed for infusion/clinic administered): [facility] - [place], [place] - [address]  Pharmacy Notified: Yes  Patient Notified: Busy signal??      .

## 2020-01-31 NOTE — TELEPHONE ENCOUNTER
Rx loaded for review/signature by Dr. Greenwood.  Rosmery Mcmahon RN BSN  Vascular [facility]

## 2020-02-05 NOTE — TELEPHONE ENCOUNTER
ERROR   Justina,  It appears to me that this was signed and received by the pharmacy as below?  Let me know if anything further is needed.  Thank you.    evolocumab (REPATHA) 140 MG/ML prefilled autoinjector 1/31/2020  Sig - Route: Inject 1 mL (140 mg) Subcutaneous every 14 days - Subcutaneous  Sent to pharmacy as: evolocumab (REPATHA) 140 MG/ML prefilled autoinjector  Class: E-Prescribe  Order: [id number]  E-Prescribing Status: Receipt confirmed by pharmacy (1/31/2020  1:25 PM CST)  Printout Tracking     External Result Report  Pharmacy     [facility] - [place], [place] - [address]

## 2020-02-05 NOTE — TELEPHONE ENCOUNTER
We are still waiting on a prescription to be sent for this medication. Can someone please help get this from the MD? Its been 4 business days.

## 2020-05-28 ENCOUNTER — TELEPHONE (OUTPATIENT)
Dept: OTHER | Facility: CLINIC | Age: 75
End: 2020-05-28

## 2020-05-28 NOTE — TELEPHONE ENCOUNTER
May 28, 2020    Contacted patient 5/12/2020 and 5/28/2020 to schedule appointments per follow-up orders that were expected to be completed by 2020. Patient did not wish to schedule any appointments when we contacted them and patient stated that they will call back when they ready to schedule. No further attempts will be made to reach patient for scheduling per these orders.     Amina Saunders    [facility]  Office: [phone]  Fax [phone]

## 2020-06-12 ENCOUNTER — VIRTUAL VISIT (OUTPATIENT)
Dept: OTHER | Facility: CLINIC | Age: 75
End: 2020-06-12
Attending: INTERNAL MEDICINE
Payer: COMMERCIAL

## 2020-06-12 VITALS
WEIGHT: 203 LBS | SYSTOLIC BLOOD PRESSURE: 127 MMHG | BODY MASS INDEX: 28.42 KG/M2 | HEIGHT: 71 IN | DIASTOLIC BLOOD PRESSURE: 70 MMHG

## 2020-06-12 DIAGNOSIS — I25.10 CORONARY ARTERY DISEASE INVOLVING NATIVE CORONARY ARTERY OF NATIVE HEART WITHOUT ANGINA PECTORIS: ICD-10-CM

## 2020-06-12 DIAGNOSIS — E78.49 FAMILIAL HYPERLIPIDEMIA: ICD-10-CM

## 2020-06-12 DIAGNOSIS — Z82.49 FAMILY HISTORY OF PREMATURE CAD: ICD-10-CM

## 2020-06-12 DIAGNOSIS — E03.9 HYPOTHYROIDISM, UNSPECIFIED TYPE: ICD-10-CM

## 2020-06-12 DIAGNOSIS — E55.9 VITAMIN D DEFICIENCY: ICD-10-CM

## 2020-06-12 PROCEDURE — 99214 OFFICE O/P EST MOD 30 MIN: CPT | Mod: ZP | Performed by: INTERNAL MEDICINE

## 2020-06-12 ASSESSMENT — MIFFLIN-ST. JEOR: SCORE: 1672.93

## 2020-06-12 NOTE — PROGRESS NOTES
"Michael Seals is a 74 year old male who is being evaluated via a billable telephone visit.      The patient has been notified of following:     \"This telephone visit will be conducted via a call between you and your physician/provider. We have found that certain health care needs can be provided without the need for a physical exam.  This service lets us provide the care you need with a short phone conversation.  If a prescription is necessary we can send it directly to your pharmacy.  If lab work is needed we can place an order for that and you can then stop by our lab to have the test done at a later time.    Telephone visits are billed at different rates depending on your insurance coverage. During this emergency period, for some insurers they may be billed the same as an in-person visit.  Please reach out to your insurance provider with any questions.    If during the course of the call the physician/provider feels a telephone visit is not appropriate, you will not be charged for this service.\"    Patient has given verbal consent for Telephone visit?  Yes     What phone number would you like to be contacted at? Mobile phone number  [phone]    How would you like to obtain your AVS? Mailed     Michael Seals is a 74 year old male who presents for:  Chief Complaint   Patient presents with     RECHECK     4 mo f/u to 11/26/19        Vitals:    Vitals:    06/12/20 1219   BP: 127/70   Weight: 203 lb (92.1 kg)   Height: 5' 11\" (1.803 m)       BMI:  Estimated body mass index is 28.31 kg/m  as calculated from the following:    Height as of this encounter: 5' 11\" (1.803 m).    Weight as of this encounter: 203 lb (92.1 kg).    Pain Score:  Data Unavailable    Tianna Kendall [facility] FOLLOW UP VISIT    PRIMARY HEALTH CARE PROVIDER:  Josef Doll MD      REASON FOR VISIT :  Follow up visit  for management of  Familial heterozygous Hyperlipidemia with statin " intolerence ( tried 5 statins and zetia in the past), initially tolerated then aches with low dose Livalo stopped.  family Hx of father early CAD age 52 and both  Parents Hx of stroke. Taking Repatha  and Livalo  He is responding well , excellent lipids last year  High TSH and low Vitamin d on replacement      HPI: Michael Seals is a 74 year old very pleasant male with past medical history of coronary artery disease underwent drug-eluting stent to RCA in March 2012, he also has a 70% mid LAD stenosis and circumflex 50% and recent EF 55-60%, hypertension, strong family history of early coronary disease father age 52 MI and also both parents with history of stroke, GERD, heterozygous familial hyperlipidemia with off treatment LDL was greater than 225 on many occasions and total cholesterol was greater than 300 on few occasions, intolerant to multiple statins he tried atorvastatin, simvastatin, pravastatin, rosuvastatin, Livalo and also Zetia.  All of them caused myalgias and he describes pain mostly in the right calf and left shoulder blade area.   Tolerating Repatha doing well.  Reviewed cardiac studies, laboratory data in the epic    On March 1, 2019  TSH was elevated at 9.1 now treated with levothyroxine  Total cholesterol was 358, triglycerides 257, , non-, HDL 52 (these labs were done off of lipid-lowering medications)  Lipoprotein a was normal  Vitamin D low normal  CPK was normal  CRP 0.8 normal  Apolipoprotein B 194 which is very high    516/2019   While taking repatha and Livalo excellent lipids and LDL 32 and HDL 55.    Few months ago   I have initiated levothyroxine 50 mcg daily and increased to 75 mcg recently  tolerating without any problems      PAST MEDICAL HISTORY  Past Medical History:   Diagnosis Date     CAD (coronary artery disease)     3/2012 LILLIAN RCA, 8/2015 Heart cath - previously placed RCA stent patent, 70% mid LAD stenosis, 50% prox CFX, EF 55-60%     CKD (chronic kidney disease)  stage 2, GFR 60-89 ml/min     baseline crt 1.20 - 1.60     Esophageal dysmotility      GERD (gastroesophageal reflux disease)      H pylori ulcer      Hip fracture (H) 01/2013    right side - closed fracture - no surgery     HTN (hypertension)      Hyperlipidaemia LDL goal <100      Moderate major depression (H)      S/P appendectomy      S/P hernia repair     left     S/P PTCA (percutaneous transluminal coronary angioplasty) 03/2012    LILLIAN RCA       CURRENT MEDICATIONS  ACE NOT PRESCRIBED, INTENTIONAL,, continuous prn Reported on 4/21/2017  aspirin (SB LOW DOSE ASA EC) 81 MG EC tablet, Take 81 mg by mouth daily.  atenolol (TENORMIN) 50 MG tablet, Take 0.5 tablets (25 mg) by mouth daily  evolocumab (REPATHA) 140 MG/ML prefilled autoinjector, Inject 1 mL (140 mg) Subcutaneous every 14 days  levothyroxine (SYNTHROID/LEVOTHROID) 75 MCG tablet, Take 1 tablet (75 mcg) by mouth daily  lisinopril (PRINIVIL/ZESTRIL) 2.5 MG tablet, TAKE 1 TABLET BY MOUTH ONCE DAILY  Multiple Vitamins-Minerals (MULTIVITAMIN PO), Take by mouth daily  nitroglycerin (NITROSTAT) 0.4 MG SL tablet, Place 1 tablet (0.4 mg) under the tongue every 5 minutes as needed for chest pain  omeprazole-sodium bicarbonate (ZEGERID)  MG capsule, Take 1 capsule (40 mg) by mouth daily as needed  pitavastatin (LIVALO) 2 MG TABS tablet, Take 1 tablet (2 mg) by mouth daily  RABEprazole (ACIPHEX) 20 MG EC tablet, Take 1 tablet (20 mg) by mouth daily    No current facility-administered medications on file prior to visit.       PREVIOUSLY TRIED LIPID LOWERING  MEDICATIONS  Statin - Yes:   Simvastatin  Atorvastatin  Rosuvastatin  Pitavastatin  Pravastatin    INTOLERANCE TO LIPID MEDICATIONS  5 statins listed above  Yes: Zetia - Yes    PAST SURGICAL HISTORY:  Past Surgical History:   Procedure Laterality Date     APPENDECTOMY       CORONARY ANGIOGRAPHY ADULT ORDER  8/2015    previously placed RCA stent patent, 70% mid LAD stenosis, 50% prox CFX, EF 55-60%     HEART  CATH STENT COR W/WO PTCA  3/2012    LILLIAN to RCA      HERNIA REPAIR         ALLERGIES     Allergies   Allergen Reactions     Atorvastatin      Crestor [Rosuvastatin]      Muscle aches     Pravastatin      Simvastatin      Zetia [Ezetimibe]      Myalgias         FAMILY HISTORY  Family History   Problem Relation Age of Onset     Cerebrovascular Disease Mother      Cerebrovascular Disease Father      C.A.D. Father      Myocardial Infarction Father      Parkinsonism Brother      Heart Failure Brother      Other - See Comments Brother          in [place]       CARDIOVASCULAR RISK FACTORS  1. Diabetes: No  2. Smoking: quit smoking some time ago.  3. HTN: fair control  4.Family history of Familial Hyperlipidemia Yes or Familial Hypertriglyceridemia Yes  5.Metabolic syndrome Yes  6. Marianna Risk Calculator: NA secondary prevention  7. 2013 GUIDELINES AHA RISK: >7.5 ( NA known CAD)  8. Age: 73 year old  9. Postmenopausal N/A  10.Prediabetes No  11. BMI: Body mass index is 28.31 kg/m .  12.SEX: male  13. CKD: No    SOCIAL HISTORY  Social History     Socioeconomic History     Marital status:      Spouse name: Not on file     Number of children: Not on file     Years of education: Not on file     Highest education level: Not on file   Occupational History     Not on file   Social Needs     Financial resource strain: Not on file     Food insecurity     Worry: Not on file     Inability: Not on file     Transportation needs     Medical: Not on file     Non-medical: Not on file   Tobacco Use     Smoking status: Former Smoker     Packs/day: 1.00     Years: 8.00     Pack years: 8.00     Types: Cigarettes     Last attempt to quit: 1970     Years since quittin.4     Smokeless tobacco: Never Used   Substance and Sexual Activity     Alcohol use: Yes     Alcohol/week: 10.0 standard drinks     Types: 10 Standard drinks or equivalent per week     Comment: 1-2 glasses of wine per evening with dinner     Drug use: No      Sexual activity: Yes     Partners: Female   Lifestyle     Physical activity     Days per week: Not on file     Minutes per session: Not on file     Stress: Not on file   Relationships     Social connections     Talks on phone: Not on file     Gets together: Not on file     Attends Yazdanism service: Not on file     Active member of club or organization: Not on file     Attends meetings of clubs or organizations: Not on file     Relationship status: Not on file     Intimate partner violence     Fear of current or ex partner: Not on file     Emotionally abused: Not on file     Physically abused: Not on file     Forced sexual activity: Not on file   Other Topics Concern      Service Not Asked     Blood Transfusions Not Asked     Caffeine Concern Yes     Comment: 3-4 cups coffee per day     Occupational Exposure Not Asked     Hobby Hazards Not Asked     Sleep Concern Yes     Comment: off and on     Stress Concern Not Asked     Weight Concern Yes     Special Diet No     Back Care Not Asked     Exercise Yes     Comment: walking 5 days week, 2.5 miles, biking occasionally     Bike Helmet Not Asked     Seat Belt Not Asked     Self-Exams Not Asked     Parent/sibling w/ CABG, MI or angioplasty before 65F 55M? Not Asked   Social History Narrative     Not on file       ROS:   General: No change in weight, sleep or appetite.  Normal energy.  No fever or chills  Eyes: Negative for vision changes or eye problems  ENT: No problems with ears, nose or throat.  No difficulty swallowing.  Resp: No coughing, wheezing or shortness of breath  CV: No chest pains or palpitations  GI: No nausea, vomiting,  heartburn, abdominal pain, diarrhea, constipation or change in bowel habits  : No urinary frequency or dysuria, bladder or kidney problems  Musculoskeletal: No significant muscle or joint pains  Neurologic: No headaches, numbness, tingling, weakness, problems with balance or coordination  Psychiatric: No problems with anxiety,  "depression or mental health  Heme/immune/allergy: No history of bleeding or clotting problems or anemia.  No allergies or immune system problems  Endocrine: No history of thyroid disease, diabetes or other endocrine disorders  Skin: No rashes,worrisome lesions or skin problems  Vascular:  No claudication, lifestyle limiting or otherwise; no ischemic rest pain; no non-healing ulcers. No weakness, No loss of sensation      BASELINE MYALGIAS    None now off of all lipid meds except taking  repatha , (previous statin related myalgias are severe right calf pain and left shoulder blade pain)    EXAM:  /70   Ht 5' 11\" (1.803 m)   Wt 203 lb (92.1 kg)   BMI 28.31 kg/m    In general, the patient is a pleasant male in no apparent distress.    This is virtual visit no exam done      Labs:  LIPID RESULTS:  Lab Results   Component Value Date    CHOL 113 05/16/2019    CHOL 358 (H) 03/01/2019    CHOL 184 08/27/2018    HDL 55 05/16/2019    HDL 52 03/01/2019    HDL 57 08/27/2018    LDL 32 05/16/2019     (H) 03/01/2019    LDL 87 08/27/2018    TRIG 132 05/16/2019    TRIG 257 (H) 03/01/2019    TRIG 198 (H) 08/27/2018    CHOLHDLRATIO 6.9 (H) 03/03/2015    CHOLHDLRATIO 4.6 07/01/2013       ADVANCED LIPID TESTS: No    LIVER ENZYME RESULTS:  Lab Results   Component Value Date    AST 22 09/11/2019    ALT 38 09/11/2019       CBC RESULTS:  Lab Results   Component Value Date    WBC 5.3 08/27/2018    RBC 4.42 08/27/2018    HGB 14.6 08/27/2018    HCT 42.7 08/27/2018    MCV 97 08/27/2018    MCH 33.0 08/27/2018    MCHC 34.2 08/27/2018    RDW 12.5 08/27/2018     08/27/2018       BMP RESULTS:  Lab Results   Component Value Date     09/11/2019    POTASSIUM 5.1 09/11/2019    CHLORIDE 107 09/11/2019    CO2 24 09/11/2019    ANIONGAP 7 09/11/2019    GLC 98 09/11/2019    BUN 23 09/11/2019    CR 1.35 (H) 09/11/2019    GFRESTIMATED 51 (L) 09/11/2019    GFRESTBLACK 59 (L) 09/11/2019    JIMMY 9.2 09/11/2019        A1C RESULTS:  Lab " Results   Component Value Date    A1C 5.2 08/18/2017           CARDIAC STUDIES: Yes    ECHO nuc study and cath etc    Reviewed all recent coronary studies, echo etc.      Assessment and Plan:     1. Familial hyperlipidemia ( Heterozygous FH with baseline untreated LDL > 250 on many occasions , TC was 358 , apolipoprotein B very high 194)    This is a very pleasant male with strong family history of early CAD, intolerance to multiple statins and Zetia, known history of coronary artery disease 2-3 vessel disease status post stent placement in RCA, hypertension recently tried PC SK 9 inhibitor Repatha which was successful and significant improvement in LDL.      Restarted repatha  excellent LDL 32 in may 2019 .    Recent vitamin D low normal, CPK normal, lipoprotein a normal, CRP 0.8.  Getting RX for hypothyroidism    At present my recommendations,    1.  Continue levothyroxine 75 mcg daily  2.  Repeat TFts ,  damien try again lowest dose of pitavastatin 1 mg daily   3.  Continue therapeutic lifestyle modifications  4.  Continue  Repatha twice a month ( medically necessary)  5.  Fasting lipid profile and other labs  next week   6. Continue  Coenzyme Q 10 200 mg daily  7. Continue  Vitamin D 3-2000 units daily      2. Statin intolerance  Only statin he tolerated was livalo 2 mg daily for a while now stopped due to aches and hypothyroidism   Once TSH is normal restart low dose 1 mg daily        3. Coronary artery disease involving native coronary artery of native heart without angina pectoris, 2 v disease hx of RCA stent and modertae LAD lesion)      Asymptomatic managed by cardiologist Dr. Betancourt continue the same.  He is on appropriate medications        4. Benign essential hypertension  Well-controlled with low dose lisinopril, beta-blocker continue the same      5. Family history of premature CAD, father age 52 MI   As delineated above, aggressively control risk factors.    6. Vitamin D deficiency  Low normal vitamin D  at 24 last year , repeat test   He will benefit with supplementation , continue  2000 units daily      This note was dictated by utilizing dragon software      Copy of this dictation to primary care physician Dr. Doll, primary cardiologist Dr. Betancourt    Total patient care time spent today 25  Minutes.  Reviewed recent laboratory data with the patient  Answered all his questions.      This visit is being conducted as a virtual visit due to the emphasis on mitigation of the COVID-19 virus pandemic. The clinician has decided that the risk of an in-office visit outweighs the benefit for this patient.         Micaela Greenwood MD, BARBARA, [facility]  Vascular Medicine   Clinical Lipidologist

## 2020-06-12 NOTE — PATIENT INSTRUCTIONS
1. Please go for fasting lipids and other labs next week order placed    2. Will restart livalo low dose after labs    3. Continue current medications    4. Video visit with me in 3 months

## 2020-06-17 DIAGNOSIS — E03.9 HYPOTHYROIDISM, UNSPECIFIED TYPE: ICD-10-CM

## 2020-06-17 DIAGNOSIS — I25.10 CORONARY ARTERY DISEASE INVOLVING NATIVE CORONARY ARTERY OF NATIVE HEART WITHOUT ANGINA PECTORIS: ICD-10-CM

## 2020-06-17 DIAGNOSIS — E78.49 FAMILIAL HYPERLIPIDEMIA: ICD-10-CM

## 2020-06-17 DIAGNOSIS — E55.9 VITAMIN D DEFICIENCY: ICD-10-CM

## 2020-06-17 LAB — CRP SERPL HS-MCNC: 1.2 MG/L

## 2020-06-17 PROCEDURE — 84443 ASSAY THYROID STIM HORMONE: CPT | Performed by: INTERNAL MEDICINE

## 2020-06-17 PROCEDURE — 80053 COMPREHEN METABOLIC PANEL: CPT | Performed by: INTERNAL MEDICINE

## 2020-06-17 PROCEDURE — 80061 LIPID PANEL: CPT | Performed by: INTERNAL MEDICINE

## 2020-06-17 PROCEDURE — 86141 C-REACTIVE PROTEIN HS: CPT | Performed by: INTERNAL MEDICINE

## 2020-06-17 PROCEDURE — 82306 VITAMIN D 25 HYDROXY: CPT | Performed by: INTERNAL MEDICINE

## 2020-06-17 PROCEDURE — 36415 COLL VENOUS BLD VENIPUNCTURE: CPT | Performed by: INTERNAL MEDICINE

## 2020-06-18 LAB
ALBUMIN SERPL-MCNC: 3.5 G/DL (ref 3.4–5)
ALP SERPL-CCNC: 87 U/L (ref 40–150)
ALT SERPL W P-5'-P-CCNC: 37 U/L (ref 0–70)
ANION GAP SERPL CALCULATED.3IONS-SCNC: 1 MMOL/L (ref 3–14)
AST SERPL W P-5'-P-CCNC: 24 U/L (ref 0–45)
BILIRUB SERPL-MCNC: 0.4 MG/DL (ref 0.2–1.3)
BUN SERPL-MCNC: 24 MG/DL (ref 7–30)
CALCIUM SERPL-MCNC: 8.5 MG/DL (ref 8.5–10.1)
CHLORIDE SERPL-SCNC: 106 MMOL/L (ref 94–109)
CHOLEST SERPL-MCNC: 169 MG/DL
CO2 SERPL-SCNC: 28 MMOL/L (ref 20–32)
CREAT SERPL-MCNC: 1.34 MG/DL (ref 0.66–1.25)
DEPRECATED CALCIDIOL+CALCIFEROL SERPL-MC: 45 UG/L (ref 20–75)
GFR SERPL CREATININE-BSD FRML MDRD: 51 ML/MIN/{1.73_M2}
GLUCOSE SERPL-MCNC: 95 MG/DL (ref 70–99)
HDLC SERPL-MCNC: 52 MG/DL
LDLC SERPL CALC-MCNC: 80 MG/DL
NONHDLC SERPL-MCNC: 117 MG/DL
POTASSIUM SERPL-SCNC: 4.6 MMOL/L (ref 3.4–5.3)
PROT SERPL-MCNC: 7.8 G/DL (ref 6.8–8.8)
SODIUM SERPL-SCNC: 135 MMOL/L (ref 133–144)
TRIGL SERPL-MCNC: 186 MG/DL
TSH SERPL DL<=0.005 MIU/L-ACNC: 3.33 MU/L (ref 0.4–4)

## 2020-06-19 ENCOUNTER — TELEPHONE (OUTPATIENT)
Dept: OTHER | Facility: CLINIC | Age: 75
End: 2020-06-19

## 2020-06-19 NOTE — TELEPHONE ENCOUNTER
----- Message from Micaela Greenwood MD sent at 6/19/2020 10:44 AM CDT -----  Improved vitamin D, and normal thyroid labs, LDL fluctuating but significantly improved compared to baseline off of meds 255 and now 80.   Continue same medications and plan  Renal function is same.    Follow up with me as scheduled.    Called to relay the above and to reiterate plan of care from virtual visit 6/12/20:    1. Please go for fasting lipids and other labs next week order placed (done)     2. Will restart livalo low dose after labs     3. Continue current medications     4. Video visit with me in 3 months    Rosmery Mcmahon RN BSN  [facility] Vascular Health  [phone]

## 2020-07-07 DIAGNOSIS — E78.5 HYPERLIPIDEMIA WITH TARGET LDL LESS THAN 70: ICD-10-CM

## 2020-07-07 RX ORDER — EVOLOCUMAB 140 MG/ML
INJECTION, SOLUTION SUBCUTANEOUS
Qty: 2 ML | Refills: 3 | Status: SHIPPED | OUTPATIENT
Start: 2020-07-07 | End: 2020-11-16

## 2020-07-07 NOTE — TELEPHONE ENCOUNTER
evolocumab (REPATHA) 140 MG/ML prefilled autoinjector  Last Written Prescription Date:  1/31/20  Last Fill Quantity: 2ml,  # refills: 5     Last office visit: 6/12/20  Due for follow up September 2020.    Unable to fill per [facility] according to the [facility] protocol.  Medication and pharmacy loaded.    Rosmery Mcmahon RN BSN  [facility]  [phone]

## 2020-08-07 ENCOUNTER — HOSPITAL ENCOUNTER (EMERGENCY)
Facility: CLINIC | Age: 75
End: 2020-08-07
Payer: COMMERCIAL

## 2020-09-08 DIAGNOSIS — I25.10 ASCVD (ARTERIOSCLEROTIC CARDIOVASCULAR DISEASE): ICD-10-CM

## 2020-09-08 DIAGNOSIS — K21.00 GASTROESOPHAGEAL REFLUX DISEASE WITH ESOPHAGITIS: ICD-10-CM

## 2020-09-08 DIAGNOSIS — E03.9 HYPOTHYROIDISM, UNSPECIFIED TYPE: ICD-10-CM

## 2020-09-08 DIAGNOSIS — I10 ESSENTIAL HYPERTENSION: ICD-10-CM

## 2020-09-10 RX ORDER — LISINOPRIL 2.5 MG/1
TABLET ORAL
Qty: 90 TABLET | Refills: 0 | Status: SHIPPED | OUTPATIENT
Start: 2020-09-10 | End: 2020-12-16

## 2020-09-10 RX ORDER — LEVOTHYROXINE SODIUM 75 UG/1
TABLET ORAL
Qty: 90 TABLET | Refills: 0 | Status: SHIPPED | OUTPATIENT
Start: 2020-09-10 | End: 2020-12-10

## 2020-09-10 RX ORDER — ATENOLOL 50 MG/1
TABLET ORAL
Qty: 45 TABLET | Refills: 0 | Status: SHIPPED | OUTPATIENT
Start: 2020-09-10 | End: 2020-12-16

## 2020-09-10 RX ORDER — RABEPRAZOLE SODIUM 20 MG/1
TABLET, DELAYED RELEASE ORAL
Qty: 90 TABLET | Refills: 0 | Status: SHIPPED | OUTPATIENT
Start: 2020-09-10 | End: 2020-12-16

## 2020-09-10 NOTE — TELEPHONE ENCOUNTER
Medication is being filled for 1 time refill only due to:  Patient needs to be seen because due for fasting physical.   General

## 2020-11-13 ENCOUNTER — TELEPHONE (OUTPATIENT)
Dept: OTHER | Facility: CLINIC | Age: 75
End: 2020-11-13

## 2020-11-13 DIAGNOSIS — E78.5 HYPERLIPIDEMIA WITH TARGET LDL LESS THAN 70: ICD-10-CM

## 2020-11-13 NOTE — TELEPHONE ENCOUNTER
On 09/17/20 LM for patient to call us back to schedule his labs and 3 month Video follow up with Dr Greenwood.     On 10/02/20 LM for patient to call us back to schedule.    On 10/05/20 Patient stated that he would call back in a couple of days when he knows his schedule better.     These are our 3 and final attempts to schedule these appointments.

## 2020-11-16 RX ORDER — EVOLOCUMAB 140 MG/ML
140 INJECTION, SOLUTION SUBCUTANEOUS
Qty: 2 EACH | Refills: 1 | Status: SHIPPED | OUTPATIENT
Start: 2020-11-16 | End: 2021-01-08

## 2020-11-16 NOTE — TELEPHONE ENCOUNTER
REPATHA SURECLICK 140 MG/ML prefilled autoinjector  Last Written Prescription Date:  7/7/20  Last Fill Quantity: 2 mo,  # refills: 3     Last office visit: 6/12/20  Follow up due:  September 2020    30 day Rx loaded for review/signature.  Note to pharmacy:  Appointment required for further refills.

## 2020-12-15 DIAGNOSIS — I10 ESSENTIAL HYPERTENSION: ICD-10-CM

## 2020-12-15 DIAGNOSIS — I25.10 ASCVD (ARTERIOSCLEROTIC CARDIOVASCULAR DISEASE): ICD-10-CM

## 2020-12-15 DIAGNOSIS — K21.9 GASTROESOPHAGEAL REFLUX DISEASE WITHOUT ESOPHAGITIS: Primary | ICD-10-CM

## 2020-12-16 RX ORDER — RABEPRAZOLE SODIUM 20 MG/1
TABLET, DELAYED RELEASE ORAL
Qty: 90 TABLET | Refills: 0 | Status: SHIPPED | OUTPATIENT
Start: 2020-12-16 | End: 2021-03-11

## 2020-12-16 RX ORDER — ATENOLOL 50 MG/1
TABLET ORAL
Qty: 45 TABLET | Refills: 0 | Status: SHIPPED | OUTPATIENT
Start: 2020-12-16 | End: 2021-03-11

## 2020-12-16 RX ORDER — LISINOPRIL 2.5 MG/1
TABLET ORAL
Qty: 90 TABLET | Refills: 0 | Status: SHIPPED | OUTPATIENT
Start: 2020-12-16 | End: 2021-03-11

## 2020-12-16 NOTE — TELEPHONE ENCOUNTER
Routing refill request to provider for review/approval because:  Sally given x1 and patient did not follow up, please advise  Labs out of range:  creat  Patient needs to be seen because it has been more than 1 year since last office visit.      Creatinine   Date Value Ref Range Status   06/17/2020 1.34 (H) 0.66 - 1.25 mg/dL Final

## 2020-12-28 ENCOUNTER — DOCUMENTATION ONLY (OUTPATIENT)
Dept: FAMILY MEDICINE | Facility: CLINIC | Age: 75
End: 2020-12-28

## 2020-12-28 DIAGNOSIS — E03.9 HYPOTHYROIDISM, UNSPECIFIED TYPE: ICD-10-CM

## 2020-12-28 DIAGNOSIS — N18.30 STAGE 3 CHRONIC KIDNEY DISEASE, UNSPECIFIED WHETHER STAGE 3A OR 3B CKD (H): Primary | ICD-10-CM

## 2020-12-28 NOTE — PROGRESS NOTES
"There are no orders from you for this patient for the upcoming lab visit. Please order labs as needed.     Reason for visit labs \"per Sharmila\"    Thank you, lab.   "

## 2020-12-31 DIAGNOSIS — E03.9 HYPOTHYROIDISM, UNSPECIFIED TYPE: ICD-10-CM

## 2020-12-31 DIAGNOSIS — N18.30 STAGE 3 CHRONIC KIDNEY DISEASE, UNSPECIFIED WHETHER STAGE 3A OR 3B CKD (H): ICD-10-CM

## 2020-12-31 LAB
ERYTHROCYTE [DISTWIDTH] IN BLOOD BY AUTOMATED COUNT: 12.5 % (ref 10–15)
HCT VFR BLD AUTO: 40.9 % (ref 40–53)
HGB BLD-MCNC: 13.7 G/DL (ref 13.3–17.7)
MCH RBC QN AUTO: 33.7 PG (ref 26.5–33)
MCHC RBC AUTO-ENTMCNC: 33.5 G/DL (ref 31.5–36.5)
MCV RBC AUTO: 101 FL (ref 78–100)
PLATELET # BLD AUTO: 221 10E9/L (ref 150–450)
RBC # BLD AUTO: 4.07 10E12/L (ref 4.4–5.9)
WBC # BLD AUTO: 5.4 10E9/L (ref 4–11)

## 2020-12-31 PROCEDURE — 85027 COMPLETE CBC AUTOMATED: CPT | Performed by: INTERNAL MEDICINE

## 2020-12-31 PROCEDURE — 36415 COLL VENOUS BLD VENIPUNCTURE: CPT | Performed by: INTERNAL MEDICINE

## 2020-12-31 PROCEDURE — 84443 ASSAY THYROID STIM HORMONE: CPT | Performed by: INTERNAL MEDICINE

## 2020-12-31 PROCEDURE — 80053 COMPREHEN METABOLIC PANEL: CPT | Performed by: INTERNAL MEDICINE

## 2020-12-31 NOTE — LETTER
[address]  [address]  [place]  [address]  Tel: [phone]    January 4, 2021    Michael Seals  [address]  [place] [address]        Dear Mr. Seals,    I had the opportunity to review your recent labs and a summary of your labs reads as follows:     Your complete blood counts show no sign of anemia, normal white blood cell count and platelets.   Your comprehensive metabolic panel showed stable impaired renal function, normal liver function, and normal fasting blood glucose indicating no evidence of diabetes mellitus.   Your thyroid function returned stable     Happy New Year       Sincerely,   Josef Doll MD /NORRIS        Enclosure: Lab Results  Results for orders placed or performed in visit on 12/31/20   Comprehensive metabolic panel     Status: Abnormal   Result Value Ref Range    Sodium 137 133 - 144 mmol/L    Potassium 5.3 3.4 - 5.3 mmol/L    Chloride 108 94 - 109 mmol/L    Carbon Dioxide 28 20 - 32 mmol/L    Anion Gap 1 (L) 3 - 14 mmol/L    Glucose 108 (H) 70 - 99 mg/dL    Urea Nitrogen 27 7 - 30 mg/dL    Creatinine 1.39 (H) 0.66 - 1.25 mg/dL    GFR Estimate 48 (L) >60 mL/min/[1.73_m2]    GFR Estimate If Black 56 (L) >60 mL/min/[1.73_m2]    Calcium 9.0 8.5 - 10.1 mg/dL    Bilirubin Total 0.3 0.2 - 1.3 mg/dL    Albumin 3.2 (L) 3.4 - 5.0 g/dL    Protein Total 7.5 6.8 - 8.8 g/dL    Alkaline Phosphatase 83 40 - 150 U/L    ALT 40 0 - 70 U/L    AST 27 0 - 45 U/L   TSH with free T4 reflex     Status: None   Result Value Ref Range    TSH 2.70 0.40 - 4.00 mU/L   CBC with platelets     Status: Abnormal   Result Value Ref Range    WBC 5.4 4.0 - 11.0 10e9/L    RBC Count 4.07 (L) 4.4 - 5.9 10e12/L    Hemoglobin 13.7 13.3 - 17.7 g/dL    Hematocrit 40.9 40.0 - 53.0 %     (H) 78 - 100 fl    MCH 33.7 (H) 26.5 - 33.0 pg    MCHC 33.5 31.5 - 36.5 g/dL    RDW 12.5 10.0 - 15.0 %    Platelet Count 221 150 - 450 10e9/L

## 2021-01-01 LAB
ALBUMIN SERPL-MCNC: 3.2 G/DL (ref 3.4–5)
ALP SERPL-CCNC: 83 U/L (ref 40–150)
ALT SERPL W P-5'-P-CCNC: 40 U/L (ref 0–70)
ANION GAP SERPL CALCULATED.3IONS-SCNC: 1 MMOL/L (ref 3–14)
AST SERPL W P-5'-P-CCNC: 27 U/L (ref 0–45)
BILIRUB SERPL-MCNC: 0.3 MG/DL (ref 0.2–1.3)
BUN SERPL-MCNC: 27 MG/DL (ref 7–30)
CALCIUM SERPL-MCNC: 9 MG/DL (ref 8.5–10.1)
CHLORIDE SERPL-SCNC: 108 MMOL/L (ref 94–109)
CO2 SERPL-SCNC: 28 MMOL/L (ref 20–32)
CREAT SERPL-MCNC: 1.39 MG/DL (ref 0.66–1.25)
GFR SERPL CREATININE-BSD FRML MDRD: 48 ML/MIN/{1.73_M2}
GLUCOSE SERPL-MCNC: 108 MG/DL (ref 70–99)
POTASSIUM SERPL-SCNC: 5.3 MMOL/L (ref 3.4–5.3)
PROT SERPL-MCNC: 7.5 G/DL (ref 6.8–8.8)
SODIUM SERPL-SCNC: 137 MMOL/L (ref 133–144)
TSH SERPL DL<=0.005 MIU/L-ACNC: 2.7 MU/L (ref 0.4–4)

## 2021-01-02 NOTE — RESULT ENCOUNTER NOTE
Kunal Rodriguez,    I had the opportunity to review your recent labs and a summary of your labs reads as follows:    Your complete blood counts show no sign of anemia, normal white blood cell count and platelets.  Your comprehensive metabolic panel showed stable impaired renal function, normal liver function, and normal fasting blood glucose indicating no evidence of diabetes mellitus.  Your thyroid function returned stable    Happy New Year      Sincerely,  Josef Doll MD

## 2021-01-08 DIAGNOSIS — E78.5 HYPERLIPIDEMIA WITH TARGET LDL LESS THAN 70: ICD-10-CM

## 2021-01-08 RX ORDER — EVOLOCUMAB 140 MG/ML
INJECTION, SOLUTION SUBCUTANEOUS
Qty: 2 EACH | Refills: 1 | Status: SHIPPED | OUTPATIENT
Start: 2021-01-08 | End: 2021-03-25

## 2021-01-08 NOTE — TELEPHONE ENCOUNTER
Unable to refill per [facility] protocol.  Med and pharm loaded.    Leyla HASSANN, RN    [facility]  Vascular [facility]  Office: [phone]  Fax: [phone]

## 2021-01-19 ENCOUNTER — TELEPHONE (OUTPATIENT)
Dept: OTHER | Facility: CLINIC | Age: 76
End: 2021-01-19

## 2021-01-19 NOTE — TELEPHONE ENCOUNTER
Prior Authorization [employer] Medication Request    Medication/Dose:  REPATHA SURECLICK 140 MG/ML prefilled autoinjector  INJECT THE CONTENTS OF 1 AUTOINJECTOR PEN UNDER THE SKIN EVERY OTHER WEEK     ICD code: Hyperlipidemia with target LDL less than 70 [E78.5]     Previously Tried and Failed: atorvastatin, simvastatin, pravastatin, rosuvastatin, livalo, zetia    Rationale:  history of coronary artery disease underwent drug-eluting stent to RCA in March 2012, he also has a 70% mid LAD stenosis and circumflex 50% and recent EF 55-60%, hypertension, strong family history of early coronary disease father age 52 MI and also both parents with history of stroke, GERD, heterozygous familial hyperlipidemia with off treatment LDL was greater than 225 on many occasions and total cholesterol was greater than 300 on few occasions, intolerant to multiple statins he tried atorvastatin, simvastatin, pravastatin, rosuvastatin, Livalo and also Zetia.    Insurance:  Member ID: [id number]     Payor: [facility] Ph: [phone]     Benefit plan: [facility] MEDICARE ADVANTAGE Ph: [phone]     Group number: [id number]        Pharmacy:  [facility] - [place], [place] - [address]   Telephone Fax   [phone]         Routing to JUDI Jerez.  Rosmery Mcmahon RN BSN  [facility]

## 2021-01-21 NOTE — TELEPHONE ENCOUNTER
Filled out additional info form and faxed it back to [facility] fax# [phone] case# [id number]

## 2021-01-21 NOTE — TELEPHONE ENCOUNTER
PA Initiation    Medication: Repatha  Insurance Company: [employer] - Phone [phone] Fax [phone]  Pharmacy Filling the Rx: [facility] - [place], [place] - [facility] [address]  Filling Pharmacy Phone: [phone]  Filling Pharmacy Fax:    Start Date: 1/21/2021    Central Prior Authorization Team   Phone: [phone]

## 2021-01-27 NOTE — TELEPHONE ENCOUNTER
Per CMM request was approved, but no dates were listed, called [employer] they stated that this is actually handled by the plan directly and gave phone number [phone], called [phone] but was transferred to provider services [phone] but automated message stated that there is no one available to take my call at this time and disconnected, but the automated message stated that submissions can be done on availity. Did and ref# is [id number]

## 2021-01-28 NOTE — TELEPHONE ENCOUNTER
Per response back from plan they do not handle reviews for the Repatha [employer]- [employer] is to, will fax this along with form and chart notes to [employer] fax# [phone]           Full letter:

## 2021-01-28 NOTE — TELEPHONE ENCOUNTER
Prior Authorization Approval    Authorization Effective Date: 1/1/2021  Authorization Expiration Date: 1/23/2022  Medication: Repatha  Approved Dose/Quantity:   Reference #: [id number]   Insurance Company: [employer] - Phone [phone] Fax [phone]  Which Pharmacy is filling the prescription (Not needed for infusion/clinic administered): [facility] - [place], [place] - [address]  Pharmacy Notified: Yes- already sold to patient on 01/12/21

## 2021-03-09 DIAGNOSIS — K21.9 GASTROESOPHAGEAL REFLUX DISEASE WITHOUT ESOPHAGITIS: ICD-10-CM

## 2021-03-09 DIAGNOSIS — I10 ESSENTIAL HYPERTENSION: ICD-10-CM

## 2021-03-09 DIAGNOSIS — I25.10 ASCVD (ARTERIOSCLEROTIC CARDIOVASCULAR DISEASE): ICD-10-CM

## 2021-03-09 DIAGNOSIS — E78.5 HYPERLIPIDEMIA WITH TARGET LDL LESS THAN 70: ICD-10-CM

## 2021-03-09 RX ORDER — EVOLOCUMAB 140 MG/ML
INJECTION, SOLUTION SUBCUTANEOUS
Refills: 1 | OUTPATIENT
Start: 2021-03-09

## 2021-03-09 NOTE — TELEPHONE ENCOUNTER
REPATHA SURECLICK 140 MG/ML prefilled autoinjector  Last Written Prescription Date:  1/8/21  Last Fill Quantity: 2,  # refills: 1       Last office visit: 6/12/20  Follow up due:  September 2020       Patient is due for follow up.  Rx denied.  Rosmery Mcmahon RN BSN  [facility]  [phone]

## 2021-03-11 RX ORDER — ATENOLOL 50 MG/1
25 TABLET ORAL DAILY
Qty: 15 TABLET | Refills: 0 | Status: SHIPPED | OUTPATIENT
Start: 2021-03-11 | End: 2021-03-22

## 2021-03-11 RX ORDER — LISINOPRIL 2.5 MG/1
2.5 TABLET ORAL DAILY
Qty: 90 TABLET | Refills: 0 | Status: SHIPPED | OUTPATIENT
Start: 2021-03-11 | End: 2021-03-22

## 2021-03-11 RX ORDER — RABEPRAZOLE SODIUM 20 MG/1
1 TABLET, DELAYED RELEASE ORAL DAILY
Qty: 30 TABLET | Refills: 0 | Status: SHIPPED | OUTPATIENT
Start: 2021-03-11 | End: 2021-03-22

## 2021-03-11 NOTE — TELEPHONE ENCOUNTER
Routing to [facility] to contact patient to schedule appointment.  30 day refills approved to last until appointment.    Aydee SHAW RN,BSN

## 2021-03-11 NOTE — TELEPHONE ENCOUNTER
Routing refill request to provider for review/approval because:    Last appointment with PCP:  9-11-19    Medication is being filled for 1 time refill only due to:  Patient needs to be seen because it has been more than one year since last visit.     Aydee CALVERT, RN      March 11, 2021  10:41 AM

## 2021-03-22 ENCOUNTER — OFFICE VISIT (OUTPATIENT)
Dept: FAMILY MEDICINE | Facility: CLINIC | Age: 76
End: 2021-03-22
Payer: COMMERCIAL

## 2021-03-22 VITALS
HEIGHT: 71 IN | HEART RATE: 55 BPM | BODY MASS INDEX: 29.88 KG/M2 | DIASTOLIC BLOOD PRESSURE: 80 MMHG | TEMPERATURE: 97.7 F | SYSTOLIC BLOOD PRESSURE: 129 MMHG | OXYGEN SATURATION: 98 % | WEIGHT: 213.4 LBS

## 2021-03-22 DIAGNOSIS — N18.31 STAGE 3A CHRONIC KIDNEY DISEASE (H): ICD-10-CM

## 2021-03-22 DIAGNOSIS — E03.9 HYPOTHYROIDISM, UNSPECIFIED TYPE: ICD-10-CM

## 2021-03-22 DIAGNOSIS — I10 ESSENTIAL HYPERTENSION: ICD-10-CM

## 2021-03-22 DIAGNOSIS — I25.10 ASCVD (ARTERIOSCLEROTIC CARDIOVASCULAR DISEASE): ICD-10-CM

## 2021-03-22 DIAGNOSIS — K21.9 GASTROESOPHAGEAL REFLUX DISEASE WITHOUT ESOPHAGITIS: ICD-10-CM

## 2021-03-22 PROCEDURE — 99214 OFFICE O/P EST MOD 30 MIN: CPT | Performed by: INTERNAL MEDICINE

## 2021-03-22 RX ORDER — LEVOTHYROXINE SODIUM 75 UG/1
75 TABLET ORAL DAILY
Qty: 90 TABLET | Refills: 3 | Status: SHIPPED | OUTPATIENT
Start: 2021-03-22 | End: 2022-03-16

## 2021-03-22 RX ORDER — ATENOLOL 25 MG/1
25 TABLET ORAL DAILY
Qty: 90 TABLET | Refills: 3 | Status: SHIPPED | OUTPATIENT
Start: 2021-03-22 | End: 2022-03-16

## 2021-03-22 RX ORDER — RABEPRAZOLE SODIUM 20 MG/1
1 TABLET, DELAYED RELEASE ORAL DAILY
Qty: 90 TABLET | Refills: 3 | Status: SHIPPED | OUTPATIENT
Start: 2021-03-22 | End: 2022-03-16

## 2021-03-22 RX ORDER — LISINOPRIL 2.5 MG/1
2.5 TABLET ORAL DAILY
Qty: 90 TABLET | Refills: 3 | Status: SHIPPED | OUTPATIENT
Start: 2021-03-22 | End: 2022-03-16

## 2021-03-22 ASSESSMENT — MIFFLIN-ST. JEOR: SCORE: 1715.11

## 2021-03-22 NOTE — PATIENT INSTRUCTIONS
(I25.10) ASCVD (arteriosclerotic cardiovascular disease)  Comment: We will plan to check labs today.    Plan: atenolol (TENORMIN) 25 MG tablet            (E03.9) Hypothyroidism, unspecified type  Comment: Continue levothyroxine  Plan: levothyroxine (EUTHYROX) 75 MCG tablet            (I10) Essential hypertension  Comment: blood pressure is excellent as above   Plan: lisinopril (ZESTRIL) 2.5 MG tablet            (K21.9) Gastroesophageal reflux disease without esophagitis  Comment: Continue proton pump inhibitor   Plan: RABEprazole (ACIPHEX) 20 MG EC tablet           chronic kidney disease  Comment; Avoid all NSAID (ibuprofen, alleve, naproxen, etc.)  Ok to take aspirin and Tylenol for pain.  Drink plenty of fluids.

## 2021-03-22 NOTE — PROGRESS NOTES
"[facility]  CLINIC PROGRESS NOTE    Subjective:     ASCVD (arteriosclerotic cardiovascular disease)  Hypothyroidism, unspecified type   Weight has been stable.  Had lost weight and eating healthy.  No heat or cold intolerance.   Essential hypertension   Blood pressure is excellent.  No concerns with either lisinopril or atenolol, but may have noticed some sense of slower heart rate and 5 seconds of slight light-headedness that dissipates quickly.  He is working out regularly 4-5 times per week.    Gastroesophageal reflux disease without esophagitis   Having symptoms only with late night eating or acidic foods, but rarely.    Past medical history, medications, allergies, social history, family history reviewed and updated in EPIC as of 3/22/2021 .    ROS  CONSTITUTIONAL: no fatigue, no unexpected change in weight  SKIN: no worrisome rashes, no worrisome moles, no worrisome lesions  EYES: no acute vision problems or changes  ENT: no ear problems, no mouth problems, no throat problems  RESP: no significant cough, no shortness of breath  CV: no chest pain, no palpitations, no new or worsening peripheral edema  GI: no nausea, no vomiting, no constipation, no diarrhea  : no frequency, no dysuria, no hematuria  MS: no claudication, no myalgias, no joint aches  PSYCHIATRIC: no changes in mood or affect      Objective:  Vitals  /80 (BP Location: Right arm, Patient Position: Sitting, Cuff Size: Adult Large)   Pulse 55   Temp 97.7  F (36.5  C) (Temporal)   Ht 1.803 m (5' 11\")   Wt 96.8 kg (213 lb 6.4 oz)   SpO2 98%   BMI 29.76 kg/m    GEN: Alert Oriented x3 NAD  HEENT: Atraumatic, normocephalic, neck supple, no thyromegaly  CV: RRR no murmurs or rubs  PULM: CTA no wheezes or crackles  ABD: Soft, nontender nondistended, no hepatosplenomegally  SKIN: No visible skin lesion or ulcerations  EXT: 2+ dorsal pedis pulses, no edema bilateral lower extremities  NEURO: Gait and station deferred, No focal " neurologic deficits  PSYCH: Mood good, affect mood congruent    No images are attached to the encounter.    No results found for this or any previous visit (from the past 24 hour(s)).    Assessment/Plan:  Patient Instructions   (I25.10) ASCVD (arteriosclerotic cardiovascular disease)  Comment: We will plan to check labs today.    Plan: atenolol (TENORMIN) 25 MG tablet            (E03.9) Hypothyroidism, unspecified type  Comment: Continue levothyroxine  Plan: levothyroxine (EUTHYROX) 75 MCG tablet            (I10) Essential hypertension  Comment: blood pressure is excellent as above   Plan: lisinopril (ZESTRIL) 2.5 MG tablet            (K21.9) Gastroesophageal reflux disease without esophagitis  Comment: Continue proton pump inhibitor   Plan: RABEprazole (ACIPHEX) 20 MG EC tablet           chronic kidney disease  Comment; Avoid all NSAID (ibuprofen, alleve, naproxen, etc.)  Ok to take aspirin and Tylenol for pain.  Drink plenty of fluids.     Follow up in 1 year    Disclaimer: This note consists of symbols derived from keyboarding, dictation and/or voice recognition software. As a result, there may be errors in the script that have gone undetected. Please consider this when interpreting information found in this chart.    Josef Doll MD  [phone]

## 2021-03-25 DIAGNOSIS — E78.5 HYPERLIPIDEMIA WITH TARGET LDL LESS THAN 70: ICD-10-CM

## 2021-03-25 RX ORDER — EVOLOCUMAB 140 MG/ML
140 INJECTION, SOLUTION SUBCUTANEOUS
Qty: 2 ML | Refills: 1 | Status: SHIPPED | OUTPATIENT
Start: 2021-03-25 | End: 2021-06-08

## 2021-03-25 NOTE — TELEPHONE ENCOUNTER
Unable to refill per FM protocol.  Med and pharm loaded.    Leyla HASSANN, RN    [facility]  Vascular [facility]  Office: [phone]  Fax: [phone]

## 2021-04-16 DIAGNOSIS — I25.10 ASCVD (ARTERIOSCLEROTIC CARDIOVASCULAR DISEASE): ICD-10-CM

## 2021-04-16 RX ORDER — ATENOLOL 50 MG/1
TABLET ORAL
Qty: 15 TABLET | Refills: 0 | OUTPATIENT
Start: 2021-04-16

## 2021-05-19 DIAGNOSIS — E78.5 HYPERLIPIDEMIA WITH TARGET LDL LESS THAN 70: ICD-10-CM

## 2021-05-19 RX ORDER — EVOLOCUMAB 140 MG/ML
INJECTION, SOLUTION SUBCUTANEOUS
Refills: 1 | OUTPATIENT
Start: 2021-05-19

## 2021-05-19 NOTE — TELEPHONE ENCOUNTER
evolocumab (REPATHA SURECLICK) 140 MG/ML prefilled autoinjector  Last Written Prescription Date:  3/25/21  Last Fill Quantity: 2ml,  # refills: 1     Last office visit: 6/12/20  Follow up due:  September 2020    Patient is due for follow up.  Request denied.    Rosmery Mcmahon RN BSN  [facility]  [phone]

## 2021-06-08 RX ORDER — EVOLOCUMAB 140 MG/ML
140 INJECTION, SOLUTION SUBCUTANEOUS
Qty: 2 ML | Refills: 1 | Status: SHIPPED | OUTPATIENT
Start: 2021-06-08 | End: 2021-07-05

## 2021-06-08 NOTE — TELEPHONE ENCOUNTER
Patient states he tried to refill Repatha twice and it was denied. Per patient, no one contacted him regarding why medication was not refilled. Patient is requesting for nurse to call.     Informed will send message to nurse for call back.       Zoya SERNA

## 2021-06-08 NOTE — TELEPHONE ENCOUNTER
Left message for Michael to let him know his repatha would be refilled, but that he needs to make an appointment for fasting lipids and a follow up with Dr. Greenwood.    Asked that he call [phone] to arrange for this appointment.    Rosmery Mcmahon RN BSN  [facility]  [phone]

## 2021-06-28 DIAGNOSIS — E78.49 FAMILIAL HYPERLIPIDEMIA: ICD-10-CM

## 2021-06-28 LAB
CHOLEST SERPL-MCNC: 254 MG/DL
HDLC SERPL-MCNC: 52 MG/DL
LDLC SERPL CALC-MCNC: 157 MG/DL
NONHDLC SERPL-MCNC: 202 MG/DL
TRIGL SERPL-MCNC: 227 MG/DL

## 2021-06-28 PROCEDURE — 80061 LIPID PANEL: CPT | Performed by: INTERNAL MEDICINE

## 2021-06-28 PROCEDURE — 36415 COLL VENOUS BLD VENIPUNCTURE: CPT | Performed by: INTERNAL MEDICINE

## 2021-07-05 ENCOUNTER — VIRTUAL VISIT (OUTPATIENT)
Dept: OTHER | Facility: CLINIC | Age: 76
End: 2021-07-05
Attending: INTERNAL MEDICINE
Payer: COMMERCIAL

## 2021-07-05 DIAGNOSIS — E78.49 FAMILIAL HYPERLIPIDEMIA: ICD-10-CM

## 2021-07-05 DIAGNOSIS — I25.10 CORONARY ARTERY DISEASE INVOLVING NATIVE CORONARY ARTERY OF NATIVE HEART WITHOUT ANGINA PECTORIS: ICD-10-CM

## 2021-07-05 DIAGNOSIS — E55.9 VITAMIN D DEFICIENCY: ICD-10-CM

## 2021-07-05 DIAGNOSIS — Z82.49 FAMILY HISTORY OF PREMATURE CAD: ICD-10-CM

## 2021-07-05 DIAGNOSIS — E03.9 HYPOTHYROIDISM, UNSPECIFIED TYPE: ICD-10-CM

## 2021-07-05 PROCEDURE — 99213 OFFICE O/P EST LOW 20 MIN: CPT | Mod: 95 | Performed by: INTERNAL MEDICINE

## 2021-07-05 RX ORDER — EVOLOCUMAB 140 MG/ML
140 INJECTION, SOLUTION SUBCUTANEOUS
Qty: 6 ML | Refills: 3 | Status: SHIPPED | OUTPATIENT
Start: 2021-07-05 | End: 2022-06-24

## 2021-07-05 NOTE — PATIENT INSTRUCTIONS
Continue repatha, new Rx sent     Fasting lipids, CMP, Vitamin D, TSH with free T4 index  in 6 months then virtual visit

## 2021-07-05 NOTE — PROGRESS NOTES
Michael is a 75 year old who is being evaluated via a billable telephone visit.      What phone number would you like to be contacted at? Home phone number [phone]  How would you like to obtain your AVS? Mail a copy      Provider visit note:    Chief complaint:  Follow up visit   Missed repatha few doses   Review of recent lipids  History of present illness:    For full details please see my previous eval, hx of He FH , on repatha, did noty tolerate multiple statins in the past , he missed few doses of repatha before blood draw,  ( 80 before)   Reviewed labs with patient.    Review of systems: Reviewed all 12 point review of systems as per HPI otherwise unremarkable    Physical exam:( no physical exam done this is virtual visit)    Reviewed recent laboratory tests, imaging studies in the epic and updated chart    Assessment and plan:    1. 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        Restarted repatha  excellent LDL 32 in may 2019 .     Recent vitamin D low normal, CPK normal, lipoprotein a normal, CRP 0.8.  Getting RX for hypothyroidism     At present my recommendations,     1. Advised not to miss repatha ,   2. Continue rest of the medications same.  3.  Continue therapeutic lifestyle modifications  4.  Continue  Repatha twice a month ( medically necessary), New Rx sent  5.  Fasting lipid profile and other labs  next week   6. Continue  Coenzyme Q 10 200 mg daily  7. Continue  Vitamin D 3-2000 units daily        2. Statin intolerance  Only statin he tolerated was livalo 2 mg daily for a  while now stopped due to aches and hypothyroidism   He is hesitant to start statin .  Will discuss next visit           3. Coronary artery disease involving native coronary artery of native heart without angina pectoris, 2 v disease hx of RCA stent and modertae LAD lesion)        Asymptomatic managed by cardiologist Dr. Betancourt continue the same.  He is on appropriate medications           4. Benign essential hypertension  Well-controlled with low dose lisinopril, beta-blocker continue the same        5. Family history of premature CAD, father age 52 MI   As delineated above, aggressively control risk factors.     6. Vitamin D deficiency  Low normal vitamin D at 26 last year , repeat test   He will benefit with supplementation , continue  2000 units daily        This note was dictated by utilizing dragon software        Copy of this dictation to primary care physician Dr. Doll, primary cardiologist Dr. Betancourt     Total patient care time spent today 22  Minutes.  Reviewed recent laboratory data with the patient  Answered all his questions.      This visit is being conducted as a virtual visit due to the emphasis on mitigation of the COVID-19 virus pandemic. The clinician has decided that the risk of an in-office visit outweighs the benefit for this patient.     Micaela Greenwood MD

## 2021-12-31 ENCOUNTER — TELEPHONE (OUTPATIENT)
Dept: OTHER | Facility: CLINIC | Age: 76
End: 2021-12-31
Payer: COMMERCIAL

## 2021-12-31 NOTE — TELEPHONE ENCOUNTER
Central Prior Authorization Team   Phone: [phone]    PA Initiation    Medication: RENEWAL - evolocumab (REPATHA SURECLICK) 140 MG/ML prefilled autoinjector  Insurance Company: [employer] - Phone [phone] Fax [phone]  Pharmacy Filling the Rx: [facility] [address] [place], [place] - [address]  Filling Pharmacy Phone: [phone]  Filling Pharmacy Fax:    Start Date: 12/31/2021

## 2021-12-31 NOTE — TELEPHONE ENCOUNTER
Prior Authorization Retail Medication Request    Medication/Dose: RENEWAL - evolocumab (REPATHA SURECLICK) 140 MG/ML prefilled autoinjector  ICD code (if different than what is on RX):    Previously Tried and Failed:    Rationale:      Insurance Name:  [employer]  Insurance ID:  CMM KEY: [id number]      Pharmacy Information (if different than what is on RX)  Name:    Phone:

## 2022-01-07 NOTE — TELEPHONE ENCOUNTER
Prior Authorization Approval    Authorization Effective Date: 1/1/2022  Authorization Expiration Date: 1/1/2023  Medication: RENEWAL - evolocumab (REPATHA SURECLICK) 140 MG/ML prefilled autoinjector  Approved Dose/Quantity:   Reference #:     Insurance Company: [employer] - Phone [phone] Fax [phone]  Expected CoPay:       CoPay Card Available:      Foundation Assistance Needed:    Which Pharmacy is filling the prescription (Not needed for infusion/clinic administered): [facility] [address] [facility] [address] [facility]  Pharmacy Notified: Yes  Patient Notified: Yes       Per call to [employer] [phone] this was already approved, effective dates above.    PA# [id number]    Proactive PA for this year.

## 2022-03-15 ENCOUNTER — LAB (OUTPATIENT)
Dept: LAB | Facility: CLINIC | Age: 77
End: 2022-03-15
Payer: COMMERCIAL

## 2022-03-15 DIAGNOSIS — E78.49 FAMILIAL HYPERLIPIDEMIA: ICD-10-CM

## 2022-03-15 DIAGNOSIS — E55.9 VITAMIN D DEFICIENCY: ICD-10-CM

## 2022-03-15 DIAGNOSIS — E03.9 HYPOTHYROIDISM, UNSPECIFIED TYPE: ICD-10-CM

## 2022-03-15 DIAGNOSIS — I25.10 CORONARY ARTERY DISEASE INVOLVING NATIVE CORONARY ARTERY OF NATIVE HEART WITHOUT ANGINA PECTORIS: ICD-10-CM

## 2022-03-15 LAB — DEPRECATED CALCIDIOL+CALCIFEROL SERPL-MC: 45 UG/L (ref 20–75)

## 2022-03-15 PROCEDURE — 36415 COLL VENOUS BLD VENIPUNCTURE: CPT

## 2022-03-15 PROCEDURE — 80061 LIPID PANEL: CPT

## 2022-03-15 PROCEDURE — 80053 COMPREHEN METABOLIC PANEL: CPT

## 2022-03-15 PROCEDURE — 82306 VITAMIN D 25 HYDROXY: CPT

## 2022-03-15 PROCEDURE — 84443 ASSAY THYROID STIM HORMONE: CPT

## 2022-03-16 ENCOUNTER — OFFICE VISIT (OUTPATIENT)
Dept: FAMILY MEDICINE | Facility: CLINIC | Age: 77
End: 2022-03-16
Payer: COMMERCIAL

## 2022-03-16 VITALS
TEMPERATURE: 97.7 F | HEART RATE: 57 BPM | RESPIRATION RATE: 16 BRPM | DIASTOLIC BLOOD PRESSURE: 75 MMHG | WEIGHT: 206 LBS | HEIGHT: 71 IN | SYSTOLIC BLOOD PRESSURE: 111 MMHG | OXYGEN SATURATION: 98 % | BODY MASS INDEX: 28.84 KG/M2

## 2022-03-16 DIAGNOSIS — I25.10 ASCVD (ARTERIOSCLEROTIC CARDIOVASCULAR DISEASE): ICD-10-CM

## 2022-03-16 DIAGNOSIS — E03.9 HYPOTHYROIDISM, UNSPECIFIED TYPE: ICD-10-CM

## 2022-03-16 DIAGNOSIS — N18.31 STAGE 3A CHRONIC KIDNEY DISEASE (H): ICD-10-CM

## 2022-03-16 DIAGNOSIS — Z00.00 ROUTINE GENERAL MEDICAL EXAMINATION AT A HEALTH CARE FACILITY: Primary | ICD-10-CM

## 2022-03-16 DIAGNOSIS — I10 ESSENTIAL HYPERTENSION: ICD-10-CM

## 2022-03-16 DIAGNOSIS — K21.9 GASTROESOPHAGEAL REFLUX DISEASE WITHOUT ESOPHAGITIS: ICD-10-CM

## 2022-03-16 LAB
ALBUMIN SERPL-MCNC: 3.4 G/DL (ref 3.4–5)
ALP SERPL-CCNC: 84 U/L (ref 40–150)
ALT SERPL W P-5'-P-CCNC: 37 U/L (ref 0–70)
ANION GAP SERPL CALCULATED.3IONS-SCNC: 7 MMOL/L (ref 3–14)
AST SERPL W P-5'-P-CCNC: 26 U/L (ref 0–45)
BILIRUB SERPL-MCNC: 0.3 MG/DL (ref 0.2–1.3)
BUN SERPL-MCNC: 19 MG/DL (ref 7–30)
CALCIUM SERPL-MCNC: 9.7 MG/DL (ref 8.5–10.1)
CHLORIDE BLD-SCNC: 109 MMOL/L (ref 94–109)
CHOLEST SERPL-MCNC: 184 MG/DL
CO2 SERPL-SCNC: 24 MMOL/L (ref 20–32)
CREAT SERPL-MCNC: 1.24 MG/DL (ref 0.66–1.25)
FASTING STATUS PATIENT QL REPORTED: YES
GFR SERPL CREATININE-BSD FRML MDRD: 60 ML/MIN/1.73M2
GLUCOSE BLD-MCNC: 105 MG/DL (ref 70–99)
HDLC SERPL-MCNC: 56 MG/DL
LDLC SERPL CALC-MCNC: 79 MG/DL
NONHDLC SERPL-MCNC: 128 MG/DL
POTASSIUM BLD-SCNC: 4.6 MMOL/L (ref 3.4–5.3)
PROT SERPL-MCNC: 8.4 G/DL (ref 6.8–8.8)
SODIUM SERPL-SCNC: 140 MMOL/L (ref 133–144)
TRIGL SERPL-MCNC: 244 MG/DL
TSH SERPL DL<=0.005 MIU/L-ACNC: 2.76 MU/L (ref 0.4–4)

## 2022-03-16 PROCEDURE — 90714 TD VACC NO PRESV 7 YRS+ IM: CPT | Performed by: INTERNAL MEDICINE

## 2022-03-16 PROCEDURE — 90471 IMMUNIZATION ADMIN: CPT | Performed by: INTERNAL MEDICINE

## 2022-03-16 PROCEDURE — 99397 PER PM REEVAL EST PAT 65+ YR: CPT | Mod: 25 | Performed by: INTERNAL MEDICINE

## 2022-03-16 PROCEDURE — 99213 OFFICE O/P EST LOW 20 MIN: CPT | Mod: 25 | Performed by: INTERNAL MEDICINE

## 2022-03-16 RX ORDER — ATENOLOL 25 MG/1
25 TABLET ORAL DAILY
Qty: 90 TABLET | Refills: 3 | Status: SHIPPED | OUTPATIENT
Start: 2022-03-16 | End: 2022-04-19

## 2022-03-16 RX ORDER — RABEPRAZOLE SODIUM 20 MG/1
1 TABLET, DELAYED RELEASE ORAL DAILY
Qty: 90 TABLET | Refills: 3 | Status: SHIPPED | OUTPATIENT
Start: 2022-03-16 | End: 2022-04-19

## 2022-03-16 RX ORDER — LISINOPRIL 2.5 MG/1
2.5 TABLET ORAL DAILY
Qty: 90 TABLET | Refills: 3 | Status: SHIPPED | OUTPATIENT
Start: 2022-03-16 | End: 2022-04-19

## 2022-03-16 RX ORDER — LEVOTHYROXINE SODIUM 75 UG/1
75 TABLET ORAL DAILY
Qty: 90 TABLET | Refills: 3 | Status: SHIPPED | OUTPATIENT
Start: 2022-03-16 | End: 2023-03-28

## 2022-03-16 ASSESSMENT — ENCOUNTER SYMPTOMS
ARTHRALGIAS: 0
DYSURIA: 0
DIARRHEA: 0
NAUSEA: 0
PARESTHESIAS: 0
WEAKNESS: 0
HEMATURIA: 0
JOINT SWELLING: 0
FREQUENCY: 0
MYALGIAS: 0
DIZZINESS: 0
SHORTNESS OF BREATH: 0
COUGH: 0
HEADACHES: 0
CHILLS: 0
EYE PAIN: 0
ABDOMINAL PAIN: 0
HEARTBURN: 0
CONSTIPATION: 0
HEMATOCHEZIA: 0
FEVER: 0
SORE THROAT: 0
NERVOUS/ANXIOUS: 0
PALPITATIONS: 0

## 2022-03-16 ASSESSMENT — PAIN SCALES - GENERAL: PAINLEVEL: NO PAIN (0)

## 2022-03-16 ASSESSMENT — ACTIVITIES OF DAILY LIVING (ADL): CURRENT_FUNCTION: NO ASSISTANCE NEEDED

## 2022-03-16 NOTE — PATIENT INSTRUCTIONS
(Z00.00) Routine general medical examination at a health care facility  (primary encounter diagnosis)  Comment: For routine exam, we reviewed  labs as ordered, cholesterol, diabetes mellitus check, liver function, renal function,We will also update vaccination history.  Shingrix vaccine is now available.  I would call your insurance to see if a shingles vaccine is covered and get this at your pharmacy   Plan:     (I25.10) ASCVD (arteriosclerotic cardiovascular disease)  Comment: Continue repatha, aspirin every other day, continue beta blocker   Plan:     (E03.9) Hypothyroidism, unspecified type  Comment: recheck TSH stable  Plan:     (I10) Essential hypertension  Comment: blood pressure is excellent, but noted orthostatic hypotension and would recommend stay hydrated and also consider using compression stockings.  We damien check echocardiogram as well given finding of heart murmur [facility] - [phone]   Plan:     (K21.9) Gastroesophageal reflux disease without esophagitis  Comment: OK to continue aciphex   Plan:

## 2022-03-16 NOTE — PROGRESS NOTES
"SUBJECTIVE:   Michael Seals is a 76 year old male who presents for Preventive Visit.    Are you in the first 12 months of your Medicare coverage?  No    Healthy Habits:     In general, how would you rate your overall health?  Good    Frequency of exercise:  4-5 days/week    Duration of exercise:  45-60 minutes    Do you usually eat at least 4 servings of fruit and vegetables a day, include whole grains    & fiber and avoid regularly eating high fat or \"junk\" foods?  Yes    Taking medications regularly:  Yes    Medication side effects:  Lightheadedness    Ability to successfully perform activities of daily living:  No assistance needed    Home Safety:  No safety concerns identified    Hearing Impairment:  Difficulty following a conversation in a noisy restaurant or crowded room    In the past 6 months, have you been bothered by leaking of urine?  No    In general, how would you rate your overall mental or emotional health?  Good      PHQ-2 Total Score: 0    Additional concerns today:  No    Do you feel safe in your environment? Yes    Have you ever done Advance Care Planning? (For example, a Health Directive, POLST, or a discussion with a medical provider or your loved ones about your wishes): No, advance care planning information given to patient to review.  Patient plans to discuss their wishes with loved ones or provider.       Fall risk       Cognitive Screening   1) Repeat 3 items (Leader, Season, Table)    2) Clock draw: NORMAL  3) 3 item recall: Recalls 3 objects  Results: 3 items recalled: COGNITIVE IMPAIRMENT LESS LIKELY    Mini-CogTM Copyright CONRAD Leigh. Licensed by the author for use in [facility]; reprinted with permission (mily@.[email or web address]). All rights reserved.      Do you have sleep apnea, excessive snoring or daytime drowsiness?: no    Reviewed and updated as needed this visit by clinical staff                  Reviewed and updated as needed this visit by Provider                 Social " History     Tobacco Use     Smoking status: Former Smoker     Packs/day: 1.00     Years: 8.00     Pack years: 8.00     Types: Cigarettes     Quit date: 1970     Years since quittin.2     Smokeless tobacco: Never Used   Substance Use Topics     Alcohol use: Yes     Alcohol/week: 10.0 standard drinks     Types: 10 Standard drinks or equivalent per week     Comment: 1-2 glasses of wine per evening with dinner         Alcohol Use 3/16/2022   Prescreen: >3 drinks/day or >7 drinks/week? No   Prescreen: >3 drinks/day or >7 drinks/week? -   No flowsheet data found.      Current providers sharing in care for this patient include:     Patient Care Team:  Josef Doll MD as PCP - General (Internal Medicine)  Josef Doll MD as Assigned PCP  Micaela Greenwood MD as Assigned Heart and Vascular Provider    The following health maintenance items are reviewed in Epic and correct as of today:  Health Maintenance Due   Topic Date Due     ANNUAL REVIEW OF HM ORDERS  Never done     ADVANCE CARE PLANNING  Never done     URINALYSIS  Never done     HEPATITIS C SCREENING  Never done     ZOSTER IMMUNIZATION (2 of 3) 2010     MEDICARE ANNUAL WELLNESS VISIT  2019     MICROALBUMIN  2019     DTAP/TDAP/TD IMMUNIZATION (2 - Td or Tdap) 2020     BMP  2021     FALL RISK ASSESSMENT  2022     Lab work is in process  Labs reviewed in EPIC    Review of Systems  Constitutional, HEENT, cardiovascular, pulmonary, GI, , musculoskeletal, neuro, skin, endocrine and psych systems are negative, except as otherwise noted.       ASCVD (arteriosclerotic cardiovascular disease)   Has been walking 3 miles 5 days per week.  Has started weight lifting as well.  No chest pain.  Has some bruising with aspirin.   Hypothyroidism, unspecified type   Thyroid stimuating hormone was 2.76. No hot or cold intolerance.   Essential hypertension   Has occasional orthosattic hypotension  Gastroesophageal  "reflux disease without esophagitis   Continues on aciphex.      OBJECTIVE:   /75 (BP Location: Left arm, Patient Position: Sitting, Cuff Size: Adult Regular)   Pulse 57   Temp 97.7  F (36.5  C) (Temporal)   Resp 16   Ht 1.803 m (5' 11\")   Wt 93.4 kg (206 lb)   SpO2 98%   BMI 28.73 kg/m   Estimated body mass index is 28.73 kg/m  as calculated from the following:    Height as of this encounter: 1.803 m (5' 11\").    Weight as of this encounter: 93.4 kg (206 lb).  Physical Exam  GENERAL: healthy, alert and no distress  EYES: Eyes grossly normal to inspection, PERRL and conjunctivae and sclerae normal  HENT: ear canals and TM's normal, nose and mouth without ulcers or lesions  NECK: no adenopathy, no asymmetry, masses,   RESP: lungs clear to auscultation - no rales, rhonchi or wheezes  CV: regular rate and rhythm, 2/6 systolic murmur, click or rub, no peripheral edema and peripheral pulses strong  ABDOMEN: soft, nontender, no hepatosplenomegaly,   MS: no gross musculoskeletal defects noted, no edema  SKIN: no suspicious lesions or rashes  NEURO: Normal strength and tone, mentation intact and speech normal  PSYCH: mentation appears normal, affect normal/bright    Diagnostic Test Results:  Labs reviewed in Epic    ASSESSMENT / PLAN:     Patient Instructions   (Z00.00) Routine general medical examination at a health care facility  (primary encounter diagnosis)  Comment: For routine exam, we reviewed  labs as ordered, cholesterol, diabetes mellitus check, liver function, renal function,We will also update vaccination history.  Shingrix vaccine is now available.  I would call your insurance to see if a shingles vaccine is covered and get this at your pharmacy   Plan:     (I25.10) ASCVD (arteriosclerotic cardiovascular disease)  Comment: Continue repatha, aspirin every other day, continue beta blocker   Plan:     (E03.9) Hypothyroidism, unspecified type  Comment: recheck TSH stable  Plan:     (I10) Essential " "hypertension  Comment: blood pressure is excellent, but noted orthostatic hypotension and would recommend stay hydrated and also consider using compression stockings.  We damien check echocardiogram as well given finding of heart murmur [facility] - [phone]   Plan:     (K21.9) Gastroesophageal reflux disease without esophagitis  Comment: OK to continue aciphex   Plan:           Patient has been advised of split billing requirements and indicates understanding: Yes    COUNSELING:  Reviewed preventive health counseling, as reflected in patient instructions    Estimated body mass index is 29.76 kg/m  as calculated from the following:    Height as of 3/22/21: 1.803 m (5' 11\").    Weight as of 3/22/21: 96.8 kg (213 lb 6.4 oz).        He reports that he quit smoking about 52 years ago. His smoking use included cigarettes. He has a 8.00 pack-year smoking history. He has never used smokeless tobacco.      Appropriate preventive services were discussed with this patient, including applicable screening as appropriate for cardiovascular disease, diabetes, osteopenia/osteoporosis, and glaucoma.  As appropriate for age/gender, discussed screening for colorectal cancer, prostate cancer, breast cancer, and cervical cancer. Checklist reviewing preventive services available has been given to the patient.    Reviewed patients plan of care and provided an AVS. The Basic Care Plan (routine screening as documented in Health Maintenance) for Michael meets the Care Plan requirement. This Care Plan has been established and reviewed with the Patient.    Counseling Resources:  ATP IV Guidelines  Pooled Cohorts Equation Calculator  Breast Cancer Risk Calculator  Breast Cancer: Medication to Reduce Risk  FRAX Risk Assessment  ICSI Preventive Guidelines  Dietary Guidelines for Americans, 2010  [employer]'s MyPlate  ASA Prophylaxis  Lung CA Screening    Josef oDll MD, MD  [facility]    Identified Health Risks:  "

## 2022-03-30 ENCOUNTER — VIRTUAL VISIT (OUTPATIENT)
Dept: OTHER | Facility: CLINIC | Age: 77
End: 2022-03-30
Attending: INTERNAL MEDICINE
Payer: COMMERCIAL

## 2022-03-30 DIAGNOSIS — E03.9 HYPOTHYROIDISM, UNSPECIFIED TYPE: ICD-10-CM

## 2022-03-30 DIAGNOSIS — I25.10 CORONARY ARTERY DISEASE INVOLVING NATIVE CORONARY ARTERY OF NATIVE HEART WITHOUT ANGINA PECTORIS: ICD-10-CM

## 2022-03-30 DIAGNOSIS — E78.49 FAMILIAL HYPERLIPIDEMIA: Primary | ICD-10-CM

## 2022-03-30 DIAGNOSIS — E55.9 VITAMIN D DEFICIENCY: ICD-10-CM

## 2022-03-30 DIAGNOSIS — Z82.49 FAMILY HISTORY OF PREMATURE CAD: ICD-10-CM

## 2022-03-30 PROCEDURE — 99213 OFFICE O/P EST LOW 20 MIN: CPT | Mod: 95 | Performed by: INTERNAL MEDICINE

## 2022-03-30 NOTE — PATIENT INSTRUCTIONS
Recent labs looks good , continue Repatha and other medications same and go for fasting Lipids, CMP, TSH, Hs CRP, Vitamin D level in 6 months then visit

## 2022-03-30 NOTE — PROGRESS NOTES
Provider visit note:    Chief complaint:  Follow-up visit  Review of recent labs  Tolerating medications    History of present illness:  For full details please see my previous eval, Known hx of He FH , CAD  on repatha and he did not tolerate multiple statins and zetia  in the past   Review of labs      Review of systems: Reviewed all 12 point review of systems as per HPI otherwise unremarkable    Physical exam:( no physical exam done this is virtual visit)    Reviewed recent laboratory tests, imaging studies in the epic and updated chart    Assessment and plan:       1. 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    Recent vitamin D  normal, CPK normal   Getting RX for hypothyroidism, TSH normal     At present my recommendations,     1.  Continue therapeutic lifestyle modifications  2.  Continue  Repatha twice a month ( medically necessary),   3.  Fasting lipid profile, CMP, TSH, Vitamin D  in 6 months  Then visit   4. Continue  Coenzyme Q 10 200 mg daily  5. Continue  Vitamin D 3-2000 units daily       2. Statin intolerance and Zetia intolerance:   On Repatha , responding well      3. Coronary artery disease involving native coronary artery of native heart without angina pectoris, 2 v disease hx of RCA stent and modertae LAD lesion)     Asymptomatic managed by cardiologist Dr. Betancourt continue the same.  He is on appropriate medications     4. Benign essential hypertension  Well-controlled with low dose lisinopril, beta-blocker continue the same        5. Family history of premature CAD, father age 52  MI   As delineated above, aggressively control risk factors.     6. Vitamin D deficiency  Continue Vitamin D supplementation      21 minutes spent on the date of the encounter doing chart review, labs, history and documentation and further activities as noted above.    This visit is being conducted as a virtual visit due to the emphasis on mitigation of the COVID-19 virus pandemic. The clinician has decided that the risk of an in-office visit outweighs the benefit for this patient.     Micaela Greenwood MD, BARBARA, FS, FNLA  Vascular Medicine  Clinical Lipidologist   Clinical Hypertension specialist

## 2022-04-13 ENCOUNTER — HOSPITAL ENCOUNTER (OUTPATIENT)
Dept: CARDIOLOGY | Facility: CLINIC | Age: 77
Discharge: HOME | End: 2022-04-13
Attending: INTERNAL MEDICINE | Admitting: INTERNAL MEDICINE
Payer: COMMERCIAL

## 2022-04-13 DIAGNOSIS — I25.10 ASCVD (ARTERIOSCLEROTIC CARDIOVASCULAR DISEASE): ICD-10-CM

## 2022-04-13 LAB — LVEF ECHO: NORMAL

## 2022-04-13 PROCEDURE — 93306 TTE W/DOPPLER COMPLETE: CPT | Mod: 26 | Performed by: INTERNAL MEDICINE

## 2022-04-13 PROCEDURE — 93306 TTE W/DOPPLER COMPLETE: CPT

## 2022-04-13 NOTE — LETTER
2022      Niurka Seals  [address]  [place] [address]        Hi Niurka,     I have had the opportunity to review your recent results and an interpretation is as follows:   Your follow-up echocardiogram does show a slight decline in your left ventricular ejection fraction at 50%.  This is not overly concerning, but it does indicate that you should continue your lisinopril and atenolol and I would also recommend following up with cardiology      Resulted Orders   Echocardiogram Complete   Result Value Ref Range    LVEF  50%     Narrative    [id number]  [id number]  [id number]  [id number]^EMETERIO^RILEY^NARINDER     [facility]  [facility]  Echocardiography Laboratory  [address]  [place] [address]  Phone [phone]  Fax [phone]     Name: NIURKA SEALS  MRN: [id number]  : 1945  Study Date: 2022 08:53 AM  Age: 76 yrs  Gender: Male  Patient Location: [facility]  Reason For Study: ASCVD (arteriosclerotic cardiovascular disease)  Ordering Physician: RILEY STORM  Referring Physician: RILEY STORM  Performed By: Dewayne Neville RDCS     BSA: 2.1 m2  Height: 71 in  Weight: 206 lb  HR: 53  ______________________________________________________________________________  ______________________________________________________________________________  Interpretation Summary     Left ventricular systolic function is mildly reduced.  The visual ejection fraction is estimated at 50%.  There is trace to mild aortic regurgitation.  There is no comparison study available.  ______________________________________________________________________________  Left Ventricle  The left ventricle is normal in size. Grade I or early diastolic dysfunction.  Left ventricular systolic function is mildly reduced. The visual ejection  fraction is estimated at 50%. Regional wall motion abnormalities cannot be  excluded due to limited visualization.      Right Ventricle  The right ventricle is mildly dilated. The right ventricle is not well  visualized. The right ventricular systolic function is normal.     Atria  Normal left atrial size. Right atrial size is normal.     Mitral Valve  The mitral valve is normal in structure and function. There is trace mitral  regurgitation. There is no mitral valve stenosis.     Tricuspid Valve  The tricuspid valve is not well visualized, but is grossly normal. Right  ventricular systolic pressure could not be approximated due to inadequate  tricuspid regurgitation.     Aortic Valve  The aortic valve is not well visualized. There is trace to mild aortic  regurgitation. No hemodynamically significant valvular aortic stenosis.     Pulmonic Valve  The pulmonic valve is not well visualized. Increased pulmonic valve velocity.     Vessels  (The upper limit of normal is 3.7cm for aortic root diameter.). Inferior vena  cava not well visualized for estimation of right atrial pressure.     Pericardium  The pericardium is not well visualized. There is no pericardial effusion.     ______________________________________________________________________________  MMode/2D Measurements & Calculations  IVSd: 1.3 cm  LVIDd: 4.5 cm  LVIDs: 3.0 cm  LVPWd: 0.96 cm  FS: 33.3 %  LV mass(C)d: 177.8 grams  LV mass(C)dI: 83.3 grams/m2  Ao root diam: 3.7 cm  LA dimension: 4.3 cm  asc Aorta Diam: 3.5 cm  LA/Ao: 1.2  LVOT diam: 2.2 cm  LVOT area: 3.7 cm2     LA Volume (BP): 39.3 ml  LA Volume Index (BP): 18.4 ml/m2  RWT: 0.42     Doppler Measurements & Calculations  MV E max donna: 50.4 cm/sec  MV A max donna: 47.1 cm/sec  MV E/A: 1.1  MV dec slope: 189.2 cm/sec2  MV dec time: 0.26 sec  LV V1 max P.2 mmHg  LV V1 max: 74.2 cm/sec  LV V1 VTI: 17.1 cm  SV(LVOT): 63.9 ml  SI(LVOT): 29.9 ml/m2  E/E' av.7     Lateral E/e': 5.8  Medial E/e': 7.6     ______________________________________________________________________________  Report approved by: Ashley  Leander Ramsey 04/13/2022 10:25 AM             If you have any questions or concerns, please call the clinic at the number listed above.       Sincerely,      Josef Doll MD        rishabh

## 2022-04-14 NOTE — RESULT ENCOUNTER NOTE
Kunal Rodriguez,    I have had the opportunity to review your recent results and an interpretation is as follows:  Your follow-up echocardiogram does show a slight decline in your left ventricular ejection fraction at 50%.  This is not overly concerning, but it does indicate that you should continue your lisinopril and atenolol and I would also recommend following up with cardiology    Sincerely,  Josef Doll MD

## 2022-04-16 DIAGNOSIS — I25.10 ASCVD (ARTERIOSCLEROTIC CARDIOVASCULAR DISEASE): ICD-10-CM

## 2022-04-16 DIAGNOSIS — I10 ESSENTIAL HYPERTENSION: ICD-10-CM

## 2022-04-16 DIAGNOSIS — K21.9 GASTROESOPHAGEAL REFLUX DISEASE WITHOUT ESOPHAGITIS: ICD-10-CM

## 2022-04-19 RX ORDER — RABEPRAZOLE SODIUM 20 MG/1
20 TABLET, DELAYED RELEASE ORAL DAILY
Qty: 90 TABLET | Refills: 3 | Status: SHIPPED | OUTPATIENT
Start: 2022-04-19 | End: 2023-04-28

## 2022-04-19 RX ORDER — LISINOPRIL 2.5 MG/1
2.5 TABLET ORAL DAILY
Qty: 90 TABLET | Refills: 3 | Status: SHIPPED | OUTPATIENT
Start: 2022-04-19 | End: 2022-07-08

## 2022-04-19 RX ORDER — ATENOLOL 25 MG/1
25 TABLET ORAL DAILY
Qty: 90 TABLET | Refills: 3 | Status: SHIPPED | OUTPATIENT
Start: 2022-04-19 | End: 2022-12-01

## 2022-04-26 ENCOUNTER — TELEPHONE (OUTPATIENT)
Dept: CARDIOLOGY | Facility: CLINIC | Age: 77
End: 2022-04-26
Payer: COMMERCIAL

## 2022-04-26 NOTE — TELEPHONE ENCOUNTER
[facility] Call Center    Phone Message    May a detailed message be left on voicemail: yes     Reason for Call: Other: Michael called to make an appointment with Dr. Betancourt. Scheduled for 4/27/22 at 9am.     Action Taken: Message routed to:  Other: [place]    Travel Screening: Not Applicable

## 2022-04-27 ENCOUNTER — OFFICE VISIT (OUTPATIENT)
Dept: CARDIOLOGY | Facility: CLINIC | Age: 77
End: 2022-04-27
Payer: COMMERCIAL

## 2022-04-27 VITALS
DIASTOLIC BLOOD PRESSURE: 70 MMHG | HEIGHT: 71 IN | SYSTOLIC BLOOD PRESSURE: 118 MMHG | BODY MASS INDEX: 28.84 KG/M2 | OXYGEN SATURATION: 97 % | HEART RATE: 52 BPM | WEIGHT: 206 LBS

## 2022-04-27 DIAGNOSIS — I10 ESSENTIAL HYPERTENSION: ICD-10-CM

## 2022-04-27 DIAGNOSIS — E78.5 DYSLIPIDEMIA (HIGH LDL; LOW HDL): ICD-10-CM

## 2022-04-27 DIAGNOSIS — I25.10 CORONARY ARTERY DISEASE INVOLVING NATIVE CORONARY ARTERY OF NATIVE HEART WITHOUT ANGINA PECTORIS: Primary | ICD-10-CM

## 2022-04-27 PROCEDURE — 99214 OFFICE O/P EST MOD 30 MIN: CPT | Performed by: INTERNAL MEDICINE

## 2022-04-27 NOTE — PROGRESS NOTES
"HISTORY OF PRESENT ILLNESS:  Asymptomatic treadmill 45 minutes 5 days week.       Orders this Visit:  No orders of the defined types were placed in this encounter.    Orders Placed This Encounter   Medications     Cholecalciferol (VITAMIN D3 PO)     Sig: Take by mouth daily     There are no discontinued medications.    No diagnosis found.    CURRENT MEDICATIONS:  Current Outpatient Medications   Medication Sig Dispense Refill     ACE NOT PRESCRIBED, INTENTIONAL, continuous prn Reported on 4/21/2017       aspirin (ASA) 81 MG EC tablet Take 81 mg by mouth every other day       atenolol (TENORMIN) 25 MG tablet Take 1 tablet (25 mg) by mouth daily 90 tablet 3     Cholecalciferol (VITAMIN D3 PO) Take by mouth daily       evolocumab (REPATHA SURECLICK) 140 MG/ML prefilled autoinjector Inject 1 mL (140 mg) Subcutaneous every 14 days 6 mL 3     levothyroxine (EUTHYROX) 75 MCG tablet Take 1 tablet (75 mcg) by mouth daily 90 tablet 3     lisinopril (ZESTRIL) 2.5 MG tablet Take 1 tablet (2.5 mg) by mouth daily 90 tablet 3     Multiple Vitamins-Minerals (MULTIVITAMIN PO) Take by mouth daily       nitroglycerin (NITROSTAT) 0.4 MG SL tablet Place 1 tablet (0.4 mg) under the tongue every 5 minutes as needed for chest pain 90 tablet 3     RABEprazole (ACIPHEX) 20 MG EC tablet Take 1 tablet (20 mg) by mouth daily 90 tablet 3       ALLERGIES     Allergies   Allergen Reactions     Atorvastatin      Crestor [Rosuvastatin]      Muscle aches     Pravastatin      Simvastatin      Zetia [Ezetimibe]      Myalgias         PAST MEDICAL, SURGICAL, FAMILY, SOCIAL HISTORY:  History was reviewed and updated as needed, see medical record.    Review of Systems:  A 12-point review of systems was completed, see medical record for detailed review of systems information.    Physical Exam:  Vitals: /70 (BP Location: Right arm, Patient Position: Sitting, Cuff Size: Adult Large)   Pulse 52   Ht 1.803 m (5' 11\")   Wt 93.4 kg (206 lb)   SpO2 97%  " 06-Dec-2021 23:06:18  BMI 28.73 kg/m      Constitutional:           Skin:           Head:           Eyes:           ENT:           Neck:           Chest:           Cardiac:                    Abdomen:           Vascular:                                        Extremities and Back:           Neurological:           ASSESSMENT: stable       RECOMMENDATIONS:   Follow-up prn      Recent Lab Results:  LIPID RESULTS:  Lab Results   Component Value Date    CHOL 184 03/15/2022    CHOL 254 (H) 06/28/2021    HDL 56 03/15/2022    HDL 52 06/28/2021    LDL 79 03/15/2022     (H) 06/28/2021    TRIG 244 (H) 03/15/2022    TRIG 227 (H) 06/28/2021    CHOLHDLRATIO 6.9 (H) 03/03/2015       LIVER ENZYME RESULTS:  Lab Results   Component Value Date    AST 26 03/15/2022    AST 27 12/31/2020    ALT 37 03/15/2022    ALT 40 12/31/2020       CBC RESULTS:  Lab Results   Component Value Date    WBC 5.4 12/31/2020    RBC 4.07 (L) 12/31/2020    HGB 13.7 12/31/2020    HCT 40.9 12/31/2020     (H) 12/31/2020    MCH 33.7 (H) 12/31/2020    MCHC 33.5 12/31/2020    RDW 12.5 12/31/2020     12/31/2020       BMP RESULTS:  Lab Results   Component Value Date     03/15/2022     12/31/2020    POTASSIUM 4.6 03/15/2022    POTASSIUM 5.3 12/31/2020    CHLORIDE 109 03/15/2022    CHLORIDE 108 12/31/2020    CO2 24 03/15/2022    CO2 28 12/31/2020    ANIONGAP 7 03/15/2022    ANIONGAP 1 (L) 12/31/2020     (H) 03/15/2022     (H) 12/31/2020    BUN 19 03/15/2022    BUN 27 12/31/2020    CR 1.24 03/15/2022    CR 1.39 (H) 12/31/2020    GFRESTIMATED 60 (L) 03/15/2022    GFRESTIMATED 48 (L) 12/31/2020    GFRESTBLACK 56 (L) 12/31/2020    JIMMY 9.7 03/15/2022    JIMMY 9.0 12/31/2020        A1C RESULTS:  Lab Results   Component Value Date    A1C 5.2 08/18/2017       INR RESULTS:  Lab Results   Component Value Date    INR 1.00 03/06/2012       We greatly appreciate the opportunity to be involved in the care of your patient, Michael COVINGTON  Arnel.    Sincerely,  Del Betancourt MD      CC  Del Betancourt MD  [address]  [place] [address]

## 2022-04-27 NOTE — LETTER
4/27/2022    Josef Doll MD, MD  [address]  [place] [address]    RE: Michaelsonia Seals       Dear Colleague,     I had the pleasure of seeing Michael Seals in the [facility].  HISTORY OF PRESENT ILLNESS:  Asymptomatic treadmill 45 minutes 5 days week.       Orders this Visit:  No orders of the defined types were placed in this encounter.    Orders Placed This Encounter   Medications     Cholecalciferol (VITAMIN D3 PO)     Sig: Take by mouth daily     There are no discontinued medications.    No diagnosis found.    CURRENT MEDICATIONS:  Current Outpatient Medications   Medication Sig Dispense Refill     ACE NOT PRESCRIBED, INTENTIONAL, continuous prn Reported on 4/21/2017       aspirin (ASA) 81 MG EC tablet Take 81 mg by mouth every other day       atenolol (TENORMIN) 25 MG tablet Take 1 tablet (25 mg) by mouth daily 90 tablet 3     Cholecalciferol (VITAMIN D3 PO) Take by mouth daily       evolocumab (REPATHA SURECLICK) 140 MG/ML prefilled autoinjector Inject 1 mL (140 mg) Subcutaneous every 14 days 6 mL 3     levothyroxine (EUTHYROX) 75 MCG tablet Take 1 tablet (75 mcg) by mouth daily 90 tablet 3     lisinopril (ZESTRIL) 2.5 MG tablet Take 1 tablet (2.5 mg) by mouth daily 90 tablet 3     Multiple Vitamins-Minerals (MULTIVITAMIN PO) Take by mouth daily       nitroglycerin (NITROSTAT) 0.4 MG SL tablet Place 1 tablet (0.4 mg) under the tongue every 5 minutes as needed for chest pain 90 tablet 3     RABEprazole (ACIPHEX) 20 MG EC tablet Take 1 tablet (20 mg) by mouth daily 90 tablet 3       ALLERGIES     Allergies   Allergen Reactions     Atorvastatin      Crestor [Rosuvastatin]      Muscle aches     Pravastatin      Simvastatin      Zetia [Ezetimibe]      Myalgias         PAST MEDICAL, SURGICAL, FAMILY, SOCIAL HISTORY:  History was reviewed and updated as needed, see medical record.    Review of Systems:  A 12-point review of systems was completed, see medical record for detailed  "review of systems information.    Physical Exam:  Vitals: /70 (BP Location: Right arm, Patient Position: Sitting, Cuff Size: Adult Large)   Pulse 52   Ht 1.803 m (5' 11\")   Wt 93.4 kg (206 lb)   SpO2 97%   BMI 28.73 kg/m      Constitutional:           Skin:           Head:           Eyes:           ENT:           Neck:           Chest:           Cardiac:                    Abdomen:           Vascular:                                        Extremities and Back:           Neurological:           ASSESSMENT: stable       RECOMMENDATIONS:   Follow-up prn      Recent Lab Results:  LIPID RESULTS:  Lab Results   Component Value Date    CHOL 184 03/15/2022    CHOL 254 (H) 06/28/2021    HDL 56 03/15/2022    HDL 52 06/28/2021    LDL 79 03/15/2022     (H) 06/28/2021    TRIG 244 (H) 03/15/2022    TRIG 227 (H) 06/28/2021    CHOLHDLRATIO 6.9 (H) 03/03/2015       LIVER ENZYME RESULTS:  Lab Results   Component Value Date    AST 26 03/15/2022    AST 27 12/31/2020    ALT 37 03/15/2022    ALT 40 12/31/2020       CBC RESULTS:  Lab Results   Component Value Date    WBC 5.4 12/31/2020    RBC 4.07 (L) 12/31/2020    HGB 13.7 12/31/2020    HCT 40.9 12/31/2020     (H) 12/31/2020    MCH 33.7 (H) 12/31/2020    MCHC 33.5 12/31/2020    RDW 12.5 12/31/2020     12/31/2020       BMP RESULTS:  Lab Results   Component Value Date     03/15/2022     12/31/2020    POTASSIUM 4.6 03/15/2022    POTASSIUM 5.3 12/31/2020    CHLORIDE 109 03/15/2022    CHLORIDE 108 12/31/2020    CO2 24 03/15/2022    CO2 28 12/31/2020    ANIONGAP 7 03/15/2022    ANIONGAP 1 (L) 12/31/2020     (H) 03/15/2022     (H) 12/31/2020    BUN 19 03/15/2022    BUN 27 12/31/2020    CR 1.24 03/15/2022    CR 1.39 (H) 12/31/2020    GFRESTIMATED 60 (L) 03/15/2022    GFRESTIMATED 48 (L) 12/31/2020    GFRESTBLACK 56 (L) 12/31/2020    JIMMY 9.7 03/15/2022    JIMMY 9.0 12/31/2020        A1C RESULTS:  Lab Results   Component Value Date    A1C " 5.2 08/18/2017       INR RESULTS:  Lab Results   Component Value Date    INR 1.00 03/06/2012       We greatly appreciate the opportunity to be involved in the care of your patient, Michael Seals.    Sincerely,  Del Betancourt MD        Del Betancourt MD  [address] [address]        Thank you for allowing me to participate in the care of your patient.      Sincerely,     Del Betancourt MD     [facility]

## 2022-04-27 NOTE — PROGRESS NOTES
Service Date: 04/27/2022    HISTORY OF PRESENT ILLNESS:  Michael Seals, a 76-year-old man with coronary artery disease, hypertension and hypercholesterolemia (statin intolerant), was seen today at your request for followup.    Since last seen, patient remains asymptomatic.  He specifically denies chest, arm, neck, jaw or back discomfort with exertion, dyspnea, syncope or palpitations.  He exercises 45 minutes daily on the treadmill 5 times per week.  He is looking forward to the summer and will travel to [place] for a long stay at his cabin.    During a routine exam, you noted a cardiac murmur and ordered an echocardiogram.  The interpreting  physician estimated the LVEF  at 50% (lower  limits of normal) , and there was trace to mild aortic insufficiency.  Because the ejection fraction appeared to be slightly lower than previous echos, you requested Cardiology followup.    PAST MEDICAL HISTORY:    1.  Coronary artery disease.  a.  History of stent implantation in proximal right coronary for new onset angina 2012.  b.  Repeat coronary angiography 2015 showing continued patency at right coronary intervention site with moderate narrowing in LAD and circumflex.  2.  Hypertension.  3.  Dyslipidemia, intolerant of all statin drugs.  On PCSK9 inhibitors with excellent response.  Followed by Dr. Greenwood.    PHYSICAL EXAMINATION:    GENERAL:  Exam today demonstrates a very pleasant and cooperative, 76-year-old man who appears younger than stated age.  VITAL SIGNS:  Blood pressure is 118/70, his heart rate is 52.  His height is 1.8 meters, his weight is 93.4 kg.  His BMI is 28.7.  RESPIRATORY:  His lungs are clear to percussion and auscultation.  CARDIOVASCULAR:  Shows a normal S1 with a normal S2.  There is a very soft 1/6 systolic ejection murmur.  I do not appreciate a diastolic murmur.    MEDICATIONS:    1.  Aspirin 81 mg daily.  2.  Atenolol 25 mg daily.  3.  Evolocumab 140 every 14 days.  4.   Levothyroxine 75 mcg daily.  5.  Lisinopril 2.5 mg daily.    LABORATORY STUDIES:  Most recent cholesterol was 184 with an LDL of 79.  His most recent creatinine is 1.24 with GFR of 60.    I have personally reviewed his echocardiogram. The patient's ejection fraction is normal in the range of 55%-60%.  I do not believe there has  been significant change. There is trivial aortic insufficiency.    ASSESSMENT:  I believe Mr. Seals's left ventricular systolic performance is within normal limits.  I do not believe any further testing or intervention is needed.  He is on excellent guideline-directed medical therapy with optimal blood pressure and LDL cholesterols.  I have nothing to add to his current excellent medical care,  but will be happy to see him any point in the future should the patient,  you or Dr. Greenwood request.    RECOMMENDATIONS:    1.  Continue present therapy.  2.  Follow up p.r.n.    We greatly appreciate the opportunity to care for your patient, Mr. Seals.    cc:    Josef Doll MD   [facility]  [address]  [place] [address]     Micaela Greenwood MD  [facility]  [address]  [place] [address]     Del Betancourt MD        D: 2022   T: 2022   MT: [id number]/[id number]    Name:     NIURKA SEALS  MRN:      [id number]        Account:      [id number]   :      1945           Service Date: 2022       Document: [id number]

## 2022-06-24 DIAGNOSIS — E78.49 FAMILIAL HYPERLIPIDEMIA: ICD-10-CM

## 2022-06-24 RX ORDER — EVOLOCUMAB 140 MG/ML
INJECTION, SOLUTION SUBCUTANEOUS
Qty: 6 ML | Refills: 2 | Status: SHIPPED | OUTPATIENT
Start: 2022-06-24 | End: 2023-02-28

## 2022-06-24 NOTE — TELEPHONE ENCOUNTER
Unable to refill per [facility] protocol.  Med and pharm loaded.    Leyla CALVERT, RN    [facility]  Office: [phone]  Fax: [phone]

## 2022-07-08 ENCOUNTER — VIRTUAL VISIT (OUTPATIENT)
Dept: FAMILY MEDICINE | Facility: CLINIC | Age: 77
End: 2022-07-08
Payer: COMMERCIAL

## 2022-07-08 DIAGNOSIS — I10 PRIMARY HYPERTENSION: Primary | ICD-10-CM

## 2022-07-08 PROCEDURE — 99213 OFFICE O/P EST LOW 20 MIN: CPT | Mod: 95 | Performed by: INTERNAL MEDICINE

## 2022-07-08 NOTE — PROGRESS NOTES
Michael is a 76 year old who is being evaluated via a billable telephone visit.      What phone number would you like to be contacted at? [phone]  How would you like to obtain your AVS? Mail a copy        Subjective   Michael is a 76 year old, presenting for the following health issues:  No chief complaint on file.      HPI   Orthostatic Hypotension   Had been noticing orthostasis over the past month.  He then stopped atenolol and lisinopril and symptoms dissipated.  He then felt better and he then resumed just the atenolol and notes that orthostatic symptoms have gone away, but has noticed low heart rate in the 50's.     Review of Systems   Constitutional, HEENT, cardiovascular, pulmonary, GI, , musculoskeletal, neuro, skin, endocrine and psych systems are negative, except as otherwise noted.      Objective           Vitals:  No vitals were obtained today due to virtual visit.    Physical Exam   healthy, alert and no distress  PSYCH: Alert and oriented times 3; coherent speech, normal   rate and volume, able to articulate logical thoughts, able   to abstract reason, no tangential thoughts, no hallucinations   or delusions  His affect is normal  RESP: No cough, no audible wheezing, able to talk in full sentences  Remainder of exam unable to be completed due to telephone visits    (I10) Primary hypertension  (primary encounter diagnosis)  Comment: We discussed pros and cons of beta blocker and will continue atenolol at low dose of 25 mg and will stop lisinopril. Recommend push fluids.  We will follow up again if symptoms return  Plan:            Phone call duration: 7 minutes    .  ..

## 2022-09-15 ENCOUNTER — TELEPHONE (OUTPATIENT)
Dept: OTHER | Facility: CLINIC | Age: 77
End: 2022-09-15

## 2022-09-15 DIAGNOSIS — E78.5 HYPERLIPIDEMIA WITH TARGET LDL LESS THAN 70: Primary | ICD-10-CM

## 2022-09-15 NOTE — TELEPHONE ENCOUNTER
Please send over an order for a Sharps container.     Thank you,  [facility]/ mail order Services  [phone]

## 2022-09-16 NOTE — TELEPHONE ENCOUNTER
DME ordered placed.    Leyla CALVERT, RN    [facility]  [facility]  Office: [phone]  Fax: [phone]

## 2022-09-19 ENCOUNTER — TELEPHONE (OUTPATIENT)
Dept: FAMILY MEDICINE | Facility: CLINIC | Age: 77
End: 2022-09-19

## 2022-09-19 DIAGNOSIS — E78.49 FAMILIAL HYPERLIPIDEMIA: ICD-10-CM

## 2022-09-19 RX ORDER — CONTAINER,EMPTY
1 EACH MISCELLANEOUS
Qty: 1 EACH | Refills: 0 | Status: SHIPPED | OUTPATIENT
Start: 2022-09-19 | End: 2024-05-02

## 2022-09-19 NOTE — TELEPHONE ENCOUNTER
Pt Requesting new rx   Sharps container  Please verify and send Rx not in pt active med list.  [facility]

## 2022-11-29 ENCOUNTER — LAB (OUTPATIENT)
Dept: LAB | Facility: CLINIC | Age: 77
End: 2022-11-29
Attending: INTERNAL MEDICINE
Payer: COMMERCIAL

## 2022-11-29 DIAGNOSIS — Z82.49 FAMILY HISTORY OF PREMATURE CAD: ICD-10-CM

## 2022-11-29 DIAGNOSIS — E55.9 VITAMIN D DEFICIENCY: ICD-10-CM

## 2022-11-29 DIAGNOSIS — Z11.59 NEED FOR HEPATITIS C SCREENING TEST: ICD-10-CM

## 2022-11-29 DIAGNOSIS — I25.10 CORONARY ARTERY DISEASE INVOLVING NATIVE CORONARY ARTERY OF NATIVE HEART WITHOUT ANGINA PECTORIS: ICD-10-CM

## 2022-11-29 DIAGNOSIS — N18.30 CHRONIC RENAL DISEASE, STAGE III (H): ICD-10-CM

## 2022-11-29 DIAGNOSIS — E78.49 FAMILIAL HYPERLIPIDEMIA: ICD-10-CM

## 2022-11-29 DIAGNOSIS — E03.9 HYPOTHYROIDISM, UNSPECIFIED TYPE: ICD-10-CM

## 2022-11-29 LAB
ALBUMIN SERPL BCG-MCNC: 3.7 G/DL (ref 3.5–5.2)
ALP SERPL-CCNC: 84 U/L (ref 40–129)
ALT SERPL W P-5'-P-CCNC: 30 U/L (ref 10–50)
ANION GAP SERPL CALCULATED.3IONS-SCNC: 8 MMOL/L (ref 7–15)
AST SERPL W P-5'-P-CCNC: 28 U/L (ref 10–50)
BILIRUB SERPL-MCNC: 0.4 MG/DL
BUN SERPL-MCNC: 19.9 MG/DL (ref 8–23)
CALCIUM SERPL-MCNC: 8.6 MG/DL (ref 8.8–10.2)
CHLORIDE SERPL-SCNC: 106 MMOL/L (ref 98–107)
CHOLEST SERPL-MCNC: 193 MG/DL
CREAT SERPL-MCNC: 1.31 MG/DL (ref 0.67–1.17)
CREAT UR-MCNC: 196 MG/DL
CRP SERPL HS-MCNC: 1.76 MG/L
DEPRECATED HCO3 PLAS-SCNC: 25 MMOL/L (ref 22–29)
GFR SERPL CREATININE-BSD FRML MDRD: 55 ML/MIN/1.73M2
GLUCOSE SERPL-MCNC: 111 MG/DL (ref 70–99)
HDLC SERPL-MCNC: 49 MG/DL
HGB BLD-MCNC: 14.1 G/DL (ref 13.3–17.7)
LDLC SERPL CALC-MCNC: 93 MG/DL
MICROALBUMIN UR-MCNC: 980 MG/L
MICROALBUMIN/CREAT UR: 500 MG/G CR (ref 0–17)
NONHDLC SERPL-MCNC: 144 MG/DL
POTASSIUM SERPL-SCNC: 4.2 MMOL/L (ref 3.4–5.3)
PROT SERPL-MCNC: 7.3 G/DL (ref 6.4–8.3)
SODIUM SERPL-SCNC: 139 MMOL/L (ref 136–145)
TRIGL SERPL-MCNC: 255 MG/DL

## 2022-11-29 PROCEDURE — 84443 ASSAY THYROID STIM HORMONE: CPT | Performed by: INTERNAL MEDICINE

## 2022-11-29 PROCEDURE — 82306 VITAMIN D 25 HYDROXY: CPT | Performed by: INTERNAL MEDICINE

## 2022-11-29 PROCEDURE — 80053 COMPREHEN METABOLIC PANEL: CPT

## 2022-11-29 PROCEDURE — 86803 HEPATITIS C AB TEST: CPT | Performed by: INTERNAL MEDICINE

## 2022-11-29 PROCEDURE — 85018 HEMOGLOBIN: CPT

## 2022-11-29 PROCEDURE — 36415 COLL VENOUS BLD VENIPUNCTURE: CPT

## 2022-11-29 PROCEDURE — 80061 LIPID PANEL: CPT

## 2022-11-29 PROCEDURE — 86141 C-REACTIVE PROTEIN HS: CPT

## 2022-11-29 PROCEDURE — 82043 UR ALBUMIN QUANTITATIVE: CPT

## 2022-11-30 LAB
DEPRECATED CALCIDIOL+CALCIFEROL SERPL-MC: 39 UG/L (ref 20–75)
HCV AB SERPL QL IA: NONREACTIVE
TSH SERPL DL<=0.005 MIU/L-ACNC: 2.84 UIU/ML (ref 0.3–4.2)

## 2022-12-01 ENCOUNTER — OFFICE VISIT (OUTPATIENT)
Dept: OTHER | Facility: CLINIC | Age: 77
End: 2022-12-01
Attending: INTERNAL MEDICINE
Payer: COMMERCIAL

## 2022-12-01 VITALS
SYSTOLIC BLOOD PRESSURE: 130 MMHG | HEART RATE: 58 BPM | WEIGHT: 212.4 LBS | DIASTOLIC BLOOD PRESSURE: 77 MMHG | BODY MASS INDEX: 29.73 KG/M2 | HEIGHT: 71 IN | OXYGEN SATURATION: 98 %

## 2022-12-01 DIAGNOSIS — R73.01 IFG (IMPAIRED FASTING GLUCOSE): ICD-10-CM

## 2022-12-01 DIAGNOSIS — R80.9 MICROALBUMINURIA: ICD-10-CM

## 2022-12-01 DIAGNOSIS — I51.9 LV DYSFUNCTION: ICD-10-CM

## 2022-12-01 DIAGNOSIS — Z82.49 FAMILY HISTORY OF PREMATURE CAD: ICD-10-CM

## 2022-12-01 DIAGNOSIS — I10 BENIGN ESSENTIAL HYPERTENSION: ICD-10-CM

## 2022-12-01 DIAGNOSIS — E78.49 FAMILIAL HYPERLIPIDEMIA: Primary | ICD-10-CM

## 2022-12-01 DIAGNOSIS — I25.10 CORONARY ARTERY DISEASE INVOLVING NATIVE CORONARY ARTERY OF NATIVE HEART WITHOUT ANGINA PECTORIS: ICD-10-CM

## 2022-12-01 PROCEDURE — 99215 OFFICE O/P EST HI 40 MIN: CPT | Performed by: INTERNAL MEDICINE

## 2022-12-01 PROCEDURE — G0463 HOSPITAL OUTPT CLINIC VISIT: HCPCS

## 2022-12-01 RX ORDER — RAMIPRIL 5 MG/1
5 CAPSULE ORAL EVERY EVENING
Qty: 30 CAPSULE | Refills: 5 | Status: SHIPPED | OUTPATIENT
Start: 2022-12-01 | End: 2023-02-06

## 2022-12-01 RX ORDER — METOPROLOL SUCCINATE 25 MG/1
25 TABLET, EXTENDED RELEASE ORAL EVERY MORNING
Qty: 30 TABLET | Refills: 5 | Status: SHIPPED | OUTPATIENT
Start: 2022-12-01 | End: 2023-06-07

## 2022-12-01 RX ORDER — OMEGA-3-ACID ETHYL ESTERS 1 G/1
2 CAPSULE, LIQUID FILLED ORAL 2 TIMES DAILY
Qty: 120 CAPSULE | Refills: 5 | Status: SHIPPED | OUTPATIENT
Start: 2022-12-01 | End: 2023-06-27

## 2022-12-01 NOTE — PATIENT INSTRUCTIONS
Please stop atenolol and take metoprolol XL 25 mg instead   Due to albumin in urine and EF 50% we will start Ramipril 5 mg daily and take at night   Go for labs in 2-3 weeks order placed   Go for echo in April 2023 order placed then office or virtual visit

## 2022-12-01 NOTE — PROGRESS NOTES
"Patient is here to discuss follow up.    /77 (BP Location: Right arm, Patient Position: Chair, Cuff Size: Adult Large)   Pulse 58   Ht 5' 11\" (1.803 m)   Wt 212 lb 6.4 oz (96.3 kg)   SpO2 98%   BMI 29.62 kg/m      Questions patient would like addressed today are: N/A.    Refills are needed: Yes:  Angelique    Has homecare services and agency name:  [facility]ons[facility]  "

## 2022-12-01 NOTE — PROGRESS NOTES
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  He denies any chest pain, shortness of breath or palpitations and no leg swelling  Blood pressure is reasonably controlled and he is not on any ACE inhibitor and taking atenolol 25 mg daily  HISTORIES:  PROBLEM LIST:   Patient Active Problem List   Diagnosis     GERD (gastroesophageal reflux disease)     S/P PTCA (percutaneous transluminal coronary angioplasty)     Esophageal dysmotility     Chronic renal disease, stage III (H)     Hyperlipidemia with target LDL less than 70     HTN (hypertension)     Impaired renal function     CAD (coronary artery disease)     Abnormal stress test     Hypothyroidism, unspecified type     PAST MEDICAL HISTORY:  Past Medical History:   Diagnosis Date     CAD (coronary artery disease)     3/2012 [facility]IAN RCA, 8/2015 Heart cath - previously placed RCA stent patent, 70% mid LAD stenosis, 50% prox CFX, EF 55-60%     CKD (chronic kidney disease) stage 2, GFR 60-89 ml/min     baseline crt 1.20 - 1.60     Esophageal dysmotility      GERD (gastroesophageal reflux disease)      H pylori ulcer      Hip fracture (H) 01/2013    right side - closed fracture - no surgery     HTN  (hypertension)      Hyperlipidaemia LDL goal <100      Moderate major depression (H)      S/P appendectomy      S/P hernia repair     left     S/P PTCA (percutaneous transluminal coronary angioplasty) 03/2012    [facility]IAN RCA     PAST SURGICAL HISTORY:  Past Surgical History:   Procedure Laterality Date     APPENDECTOMY       CORONARY ANGIOGRAPHY ADULT ORDER  8/2015    previously placed RCA stent patent, 70% mid LAD stenosis, 50% prox CFX, EF 55-60%     HEART CATH STENT COR W/WO PTCA  3/2012    [facility]ILLIAN to RCA      HERNIA REPAIR       CURRENT MEDICATIONS:  Current Outpatient Medications   Medication Sig Dispense Refill     ACE NOT PRESCRIBED, INTENTIONAL, continuous prn Reported on 4/21/2017       aspirin (ASA) 81 MG EC tablet Take 81 mg by mouth every other day       Cholecalciferol (VITAMIN D3 PO) Take by mouth daily       levothyroxine (EUTHYROX) 75 MCG tablet Take 1 tablet (75 mcg) by mouth daily 90 tablet 3     metoprolol succinate ER (TOPROL XL) 25 MG 24 hr tablet Take 1 tablet (25 mg) by mouth every morning 30 tablet 5     Multiple Vitamins-Minerals (MULTIVITAMIN PO) Take by mouth daily       nitroglycerin (NITROSTAT) 0.4 MG SL tablet Place 1 tablet (0.4 mg) under the tongue every 5 minutes as needed for chest pain 90 tablet 3     omega-3 acid ethyl esters (LOVAZA) 1 g capsule Take 2 capsules (2 g) by mouth 2 times daily 120 capsule 5     RABEprazole (ACIPHEX) 20 MG EC tablet Take 1 tablet (20 mg) by mouth daily 90 tablet 3     ramipril (ALTACE) 5 MG capsule Take 1 capsule (5 mg) by mouth every evening 30 capsule 5     REPATHA SURECLICK 140 MG/ML prefilled autoinjector INJECT THE CONTENTS OF ONE PEN UNDER THE SKIN EVERY OTHER WEEK 6 mL 2     Sharps Container MISC 1 each every 14 days 1 each 0     ALLERGIES:  Allergies   Allergen Reactions     Atorvastatin      Crestor [Rosuvastatin]      Muscle aches     Pravastatin      Simvastatin      Zetia [Ezetimibe]      Myalgias       SOCIAL HISTORY:  Social History      Socioeconomic History     Marital status:      Spouse name: Not on file     Number of children: Not on file     Years of education: Not on file     Highest education level: Not on file   Occupational History     Not on file   Tobacco Use     Smoking status: Former     Packs/day: 1.00     Years: 8.00     Pack years: 8.00     Types: Cigarettes     Quit date:      Years since quittin.9     Smokeless tobacco: Never   Substance and Sexual Activity     Alcohol use: Yes     Alcohol/week: 10.0 standard drinks     Types: 10 Standard drinks or equivalent per week     Comment: 1-2 glasses of wine per evening with dinner     Drug use: No     Sexual activity: Yes     Partners: Female   Other Topics Concern      Service Not Asked     Blood Transfusions Not Asked     Caffeine Concern Yes     Comment: 3-4 cups coffee per day     Occupational Exposure Not Asked     Hobby Hazards Not Asked     Sleep Concern Yes     Comment: off and on     Stress Concern Not Asked     Weight Concern Yes     Special Diet No     Back Care Not Asked     Exercise Yes     Comment: walking 5 days week, 2.5 miles, biking occasionally     Bike Helmet Not Asked     Seat Belt Not Asked     Self-Exams Not Asked     Parent/sibling w/ CABG, MI or angioplasty before 65F 55M? Not Asked   Social History Narrative     Not on file     Social Determinants of Health     Financial Resource Strain: Not on file   Food Insecurity: Not on file   Transportation Needs: Not on file   Physical Activity: Not on file   Stress: Not on file   Social Connections: Not on file   Intimate Partner Violence: Not on file   Housing Stability: Not on file     FAMILY HISTORY:  Family History   Problem Relation Age of Onset     Cerebrovascular Disease Mother      Cerebrovascular Disease Father      C.A.D. Father      Myocardial Infarction Father      Parkinsonism Brother      Heart Failure Brother      Other - See Comments Brother          in [place]  "    REVIEW OF SYSTEMS:  CONSTITUTIONAL:no malaise, fatigue, or other general symptoms  EYES: no subjective changes in visual acuity, no photophobia  ENT/MOUTH: no complaints of rhinorrhea, nasal congestion, sore throat, hearing changes  RESP:no SOB  CV: no c/o exertional chest pressure or JAMES  GI: No abdominal pain, constipation, change in bowel movements, nausea, pyrosis, BRBPR  :no polyuria or polydipsia, no dysuria, no gross hematuria  MUSCULOSKELATAL:no arthalgias or myalgias  INTEGUMENTARY/SKIN: no pruritis, rashes, or moles with recent change in size, shape, or pigmentation  NEURO: no gross sensory or motor symptoms, no dizziness, no confusion  ENDOCRINE: no polyuria or polydipsia, no heat or cold intolerance  HEME/ALLERGY/IMMUNE: no fevers, chills, night sweats, or unwanted weight loss  PSYCHIATRIC: no depression, anxiety, or internal stimuli  EXAM:  /77 (BP Location: Right arm, Patient Position: Chair, Cuff Size: Adult Large)   Pulse 58   Ht 5' 11\" (1.803 m)   Wt 212 lb 6.4 oz (96.3 kg)   SpO2 98%   BMI 29.62 kg/m    BMI: Body mass index is 29.62 kg/m .  GENERAL APPEARANCE:  Pleasant  Healthy appearing male , alert, active, no distress cooperative.  EXAM:  EYES: clear conjunctiva, no cataracts, no obvious fundoscopic abnormalities  HENT: oropharynx, nares, and TMs are WNL  NECK: no JVD, thyromegaly or lymphadenopathy, no cervical bruits  RESP: clear to auscultation without rales, wheezes, or rhonchi  CV: RRR, no murmurs, gallops, or rubs  LYMPH: no cervical , axillary, or inguinal lymphadenopathy appreciated  GI: NABS, ND/NT, no masses or organomegally appreciated  MS: no obvoius clinicallly relevant arthropathy, no evidence vasculitis  SKIN: no nevi clinically suspicious for malignancy are noted  NEURO: CN II-XII intact, no localizing sensory or motor abnoramlities noted, DTRs symmetrical bilaterally  PSYCH: Mental status exam reveals the pt to have normal mood and affect. There is no disorder " "of thought form or content. There is no response to internal stimuli. There is no suicidal or homicidal ideation.    Reviewed recent laboratory tests and recent echocardiogram  ECHO 4/2022  \"Interpretation Summary     Left ventricular systolic function is mildly reduced.  The visual ejection fraction is estimated at 50%.  There is trace to mild aortic regurgitation.  There is no comparison study available.\"      A/P;  (E78.49) Familial hyperlipidemia ( Heterozygous FH with baseline untreated LDL > 225 on many occasions , TC was 300 range)  (primary encounter diagnosis)  He made significant progress with Repatha and tolerating but LDL is still greater than 70 and also elevated triglycerides  Will initiate Lovaza  Take Repatha without missing  TLC suggested and lose 10 pounds    Plan: omega-3 acid ethyl esters (LOVAZA) 1 g capsule            (I25.10) Coronary artery disease involving native coronary artery of native heart without angina pectoris, 2 v disease hx of RCA stent and modertae LAD lesion)  (I51.9) LV dysfunction EF 50% ( Echo 4/2022)   (Z82.49) Family history of premature CAD, father age 52 MI   Comment: He is asymptomatic  Not on any ACE inhibitor or ARB  Given mild LV dysfunction and also urine for microalbumin will initiate ramipril 5 mg daily in the evening  Was taking atenolol based on evidence he will benefit either with the Coreg or long-acting metoprolol XL and he prefers once a day medication will switch to metoprolol XL 25 mg daily and monitor blood pressure and pulse rate  Will get BMP labs in 2 weeks  Plan: ramipril (ALTACE) 5 MG capsule, metoprolol         succinate ER (TOPROL XL) 25 MG 24 hr tablet,         Echocardiogram Complete        (R73.01) IFG (impaired fasting glucose)  Plan: Hemoglobin A1 C  (R80.9) Microalbuminuria  Stage III CKD  Comment: Unclear why he has microalbuminuria and there is never or small blood in the urine contributed we will repeat the urine for microalbumin optimize " the medications add ACE inhibitor  Follow the diet avoid NSAIDs etc.  Plan: Albumin Random Urine Quantitative with Creat         Ratio, metoprolol succinate ER (TOPROL XL) 25         MG 24 hr tablet            (I10) Benign essential hypertension  Comment: Well-controlled with current medications optimized further due to microalbuminuria and also mild LV dysfunction see above  Plan: Basic metabolic panel, metoprolol succinate ER         (TOPROL XL) 25 MG 24 hr tablet        More than 40 minutes spent on the date of the encounter doing chart review, review of recent labs, previous evaluation, history and exam, documentation and further activities as noted above    This note was dictated by utilizing Dragon software  Copy of this note to primary care physician Dr. Doll and primary cardiologist Dr.Manoles Micaela Greenwood MD, FAHA, FSVM, FNLA, FACP  Vascular medicine  Clinical hypertension specialist  Clinical lipidologist

## 2022-12-06 ENCOUNTER — TELEPHONE (OUTPATIENT)
Dept: OTHER | Facility: CLINIC | Age: 77
End: 2022-12-06

## 2022-12-06 NOTE — TELEPHONE ENCOUNTER
Follow-up to 12/1/22      Non-fasting labs & urinalysis  in 2-3 weeks    Results will be communicated via MyChart or telephone.

## 2022-12-08 NOTE — TELEPHONE ENCOUNTER
Left voicemail with instructions for patient to call back to schedule their appointment(s)    December 8, 2022 , 9:18 AM

## 2022-12-19 ENCOUNTER — LAB (OUTPATIENT)
Dept: LAB | Facility: CLINIC | Age: 77
End: 2022-12-19
Payer: COMMERCIAL

## 2022-12-19 DIAGNOSIS — R73.01 IFG (IMPAIRED FASTING GLUCOSE): ICD-10-CM

## 2022-12-19 DIAGNOSIS — R80.9 MICROALBUMINURIA: ICD-10-CM

## 2022-12-19 DIAGNOSIS — I10 BENIGN ESSENTIAL HYPERTENSION: ICD-10-CM

## 2022-12-19 LAB
ANION GAP SERPL CALCULATED.3IONS-SCNC: 8 MMOL/L (ref 7–15)
BUN SERPL-MCNC: 26.2 MG/DL (ref 8–23)
CALCIUM SERPL-MCNC: 9.6 MG/DL (ref 8.8–10.2)
CHLORIDE SERPL-SCNC: 103 MMOL/L (ref 98–107)
CREAT SERPL-MCNC: 1.45 MG/DL (ref 0.67–1.17)
DEPRECATED HCO3 PLAS-SCNC: 25 MMOL/L (ref 22–29)
GFR SERPL CREATININE-BSD FRML MDRD: 49 ML/MIN/1.73M2
GLUCOSE SERPL-MCNC: 102 MG/DL (ref 70–99)
HBA1C MFR BLD: 5.4 % (ref 0–5.6)
POTASSIUM SERPL-SCNC: 5 MMOL/L (ref 3.4–5.3)
SODIUM SERPL-SCNC: 136 MMOL/L (ref 136–145)

## 2022-12-19 PROCEDURE — 82043 UR ALBUMIN QUANTITATIVE: CPT

## 2022-12-19 PROCEDURE — 36415 COLL VENOUS BLD VENIPUNCTURE: CPT

## 2022-12-19 PROCEDURE — 83036 HEMOGLOBIN GLYCOSYLATED A1C: CPT

## 2022-12-19 PROCEDURE — 80048 BASIC METABOLIC PNL TOTAL CA: CPT

## 2022-12-20 LAB
CREAT UR-MCNC: 180 MG/DL
MICROALBUMIN UR-MCNC: 436 MG/L
MICROALBUMIN/CREAT UR: 242.22 MG/G CR (ref 0–17)

## 2022-12-21 NOTE — RESULT ENCOUNTER NOTE
Still protein in the urine, please work with primary for BP management and appropriate medications etc   No diabetes ( good !)   Creatinine is slightly worse than before

## 2023-01-02 ENCOUNTER — TELEPHONE (OUTPATIENT)
Dept: OTHER | Facility: CLINIC | Age: 78
End: 2023-01-02

## 2023-01-02 NOTE — TELEPHONE ENCOUNTER
----- Message from Micaela Greenwood MD sent at 12/21/2022  2:30 PM CST -----  Still protein in the urine, please work with primary for BP management and appropriate medications etc   No diabetes ( good !)   Creatinine is slightly worse than before

## 2023-01-02 NOTE — TELEPHONE ENCOUNTER
Ok to LD on Home 5/21/19 vg    Left voice message with Dr. Greenwood's comments.  Asked that patient call me with any further questions.    Rosmery Mcmahon RN BSN  [facility]  [phone]

## 2023-01-30 ENCOUNTER — NURSE TRIAGE (OUTPATIENT)
Dept: FAMILY MEDICINE | Facility: CLINIC | Age: 78
End: 2023-01-30
Payer: COMMERCIAL

## 2023-01-30 NOTE — TELEPHONE ENCOUNTER
"Nurse Triage SBAR    Is this a 2nd Level Triage? YES, LICENSED PRACTITIONER REVIEW IS REQUIRED    Situation:   pt called and had a question about recent medication change and heart rate during exercise. He was triaged for med question/lightheadedness. Dispo to call back from clinic within one hour, also see pcp within 3 days for symptom. Pt agrees. He is going to wait to hear back from primary before contacting his specialist who prescribed the new medication for BP.  Routing to PCP/care team.     Background:     metoprolol succinate ER (TOPROL XL) 25 MG 24 hr tablet 30 tablet 5 12/1/2022  --   Sig - Route: Take 1 tablet (25 mg) by mouth every morning - Oral       Stopped:     atenolol (TENORMIN) 25 MG tablet (Discontinued) 90 tablet 3 3/16/2022 4/19/2022 No   Sig - Route: Take 1 tablet (25 mg) by mouth daily - Oral       Assessment:   pt needing review/recommendations for medication changes/side effects     Protocol Recommended Disposition:   Discuss With PCP And Callback By Nurse Within 1 Hour, See PCP Within 3 Days    Recommendation:   answer callback from clinic.      Routed to provider    Does the patient meet one of the following criteria for ADS visit consideration? 16+ years old, with an [facility] PCP     TIP  Providers, please consider if this condition is appropriate for management at one of our Acute and Diagnostic Services sites.     If patient is a good candidate, please use dotphrase <dot>triageresponse and select Refer to ADS to document.      1. NAME of MEDICATION: \"What medicine are you calling about?\"      metoprolol  2. QUESTION: \"What is your question?\" (e.g., double dose of medicine, side effect)      Heart rate with exercise is max 170, pt reporting feeling lightheaded. Stops exercise and rechecks after 15 min: /60, HR 62.   3. PRESCRIBING HCP: \"Who prescribed it?\" Reason: if prescribed by specialist, call should be referred to that group.      Dr. Almeida; vascular specialist. However, pt " "is wondering if Dr. Doll would be prescriber?  4. SYMPTOMS: \"Do you have any symptoms?\"      Pt reporting some mild lightheadedness during exercising. He runs 5 x a week. This is a new occurrence and started after blood pressure medication change after his office visit 12/01/22. He stopped taking atenolol and started taking metoprolol. Reporting while he was taking metoprolol, that his heart rate did not usually go over 120 bpm during exercise. Lightheaded will also occur occasionally with sudden standing.   5. SEVERITY: If symptoms are present, ask \"Are they mild, moderate or severe?\"      mild    Reason for Disposition    [1] MILD dizziness (e.g., walking normally) AND [2] has NOT been evaluated by physician for this  (Exception: dizziness caused by heat exposure, sudden standing, or poor fluid intake)    Caller has URGENT medicine question about med that PCP or specialist prescribed and triager unable to answer question    Additional Information    Negative: Caller has medication question about med NOT prescribed by PCP and triager unable to answer question (e.g., compatibility with other med, storage)    Negative: DOUBLE DOSE (an extra dose or lesser amount) of prescription drug and NO symptoms  (Exception: A double dose of antibiotics.)    Negative: Diabetes drug error or overdose (e.g., took wrong type of insulin or took extra dose)    Negative: MORE THAN A DOUBLE DOSE of a prescription or over-the-counter (OTC) drug    Negative: DOUBLE DOSE (an extra dose or lesser amount) of prescription drug and any symptoms (e.g., dizziness, nausea, pain, sleepiness)    Negative: DOUBLE DOSE (an extra dose or lesser amount) of over-the-counter (OTC) drug and any symptoms (e.g., dizziness, nausea, pain, sleepiness)    Negative: Took another person's prescription drug    Negative: Drug overdose and triager unable to answer question    Negative: Caller requesting a renewal or refill of a medicine patient is currently " taking    Negative: Caller requesting information unrelated to medicine    Negative: Caller requesting information about COVID-19 Vaccine    Negative: Caller requesting information about Emergency Contraception    Negative: Caller requesting information about Combined Birth Control Pills    Negative: Caller requesting information about Progestin Birth Control Pills    Negative: Caller requesting information about Post-Op pain or medicines    Negative: Caller requesting a prescription antibiotic (such as penicillin) for Strep throat and has a positive culture result    Negative: Caller requesting a prescription anti-viral med (such as Tamiflu) and has influenza (flu) symptoms    Negative: Immunization reaction suspected    Negative: Rash while taking a medicine or within 3 days of stopping it    Negative: Asthma and having symptoms of asthma (cough, wheezing, etc.)    Negative: Symptom of illness (e.g., headache, abdominal pain, earache, vomiting) that are more than mild    Negative: Breastfeeding questions about mother's medicines and diet    Negative: Intentional drug overdose and suicidal thoughts or ideas    Negative: Prescription not at pharmacy and was prescribed by PCP recently  (Exception: triager has access to EMR and prescription is recorded there. Go to Home Care and confirm for pharmacy.)    Negative: Pharmacy calling with prescription question and triager unable to answer question    Negative: [1] MODERATE dizziness (e.g., interferes with normal activities) AND [2] has NOT been evaluated by physician for this  (Exception: dizziness caused by heat exposure, sudden standing, or poor fluid intake)    Negative: Fever present > 3 days (72 hours)    Negative: Taking a medicine that could cause dizziness (e.g., blood pressure medications, diuretics)    Negative: [1] Dizziness caused by heat exposure, sudden standing, or poor fluid intake AND [2] no improvement after 2 hours of rest and fluids    Negative: [1]  "Fever > 103 F (39.4 C) AND [2] not able to get the fever down using Fever Care Advice    Negative: [1] Fever > 101 F (38.3 C) AND [2] age > 60 years    Negative: [1] Fever > 100.0 F (37.8 C) AND [2] bedridden (e.g., nursing home patient, CVA, chronic illness, recovering from surgery)    Negative: [1] Fever > 100.0 F (37.8 C) AND [2] diabetes mellitus or weak immune system (e.g., HIV positive, cancer chemo, splenectomy, organ transplant, chronic steroids)    Negative: SEVERE dizziness (e.g., unable to stand, requires support to walk, feels like passing out now)    Negative: Extra heart beats OR irregular heart beating (i.e., \"palpitations\")    Negative: [1] Drinking very little AND [2] dehydration suspected (e.g., no urine > 12 hours, very dry mouth, very lightheaded)    Negative: [1] Weakness (i.e., paralysis, loss of muscle strength) of the face, arm / hand, or leg / foot on one side of the body AND [2] sudden onset AND [3] brief (now gone)    Negative: [1] Numbness (i.e., loss of sensation) of the face, arm / hand, or leg / foot on one side of the body AND [2] sudden onset AND [3] brief (now gone)    Negative: [1] Loss of speech or garbled speech AND [2] sudden onset AND [3] brief (now gone)    Negative: Loss of vision or double vision (Exception: similar to previous migraines)    Negative: Patient sounds very sick or weak to the triager    Negative: Difficulty breathing    Negative: Chest pain    Negative: Rectal bleeding, bloody stool, or tarry-black stool    Negative: [1] Vomiting AND [2] contains red blood or black (\"coffee ground\") material    Negative: Vomiting is main symptom    Negative: Diarrhea is main symptom    Negative: Headache is main symptom    Negative: Patient states that they are having an anxiety or panic attack    Negative: Dizziness from low blood sugar (i.e., < 60 mg/dl or 3.5 mmol/l)    Negative: Dizziness is described as a spinning sensation (i.e., vertigo)    Negative: Heat exhaustion " suspected (i.e., dehydration from heat exposure)    Negative: SEVERE difficulty breathing (e.g., struggling for each breath, speaks in single words)    Negative: [1] Difficulty breathing or swallowing AND [2] started suddenly after medicine, an allergic food or bee sting    Negative: Shock suspected (e.g., cold/pale/clammy skin, too weak to stand, low BP, rapid pulse)    Negative: Difficult to awaken or acting confused (e.g., disoriented, slurred speech)    Negative: [1] Weakness (i.e., paralysis, loss of muscle strength) of the face, arm or leg on one side of the body AND [2] sudden onset AND [3] present now    Negative: [1] Numbness (i.e., loss of sensation) of the face, arm or leg on one side of the body AND [2] sudden onset AND [3] present now    Negative: [1] Loss of speech or garbled speech AND [2] sudden onset AND [3] present now    Negative: Overdose (accidental or intentional) of medications    Negative: [1] Fainted > 15 minutes ago AND [2] still feels too weak or dizzy to stand    Negative: Heart beating < 50 beats per minute OR > 140 beats per minute    Negative: Sounds like a life-threatening emergency to the triager    Negative: [1] MODERATE dizziness (e.g., interferes with normal activities) AND [2] has been evaluated by physician for this    Protocols used: DIZZINESS - [id number], MEDICATION QUESTION CALL-A-OH

## 2023-01-30 NOTE — TELEPHONE ENCOUNTER
Pt was called. His main concern is HR with exercise 170 where as while on atenolol HR >120 with activity. He denies chest pain, breathing problems and has dizziness when he stands up too quickly. HR at rest is 71 /71 and 109/74. Future appt was scheduled advised pt to call triage back for an earlier visit if new or worsening symptoms. Pt verbalized agreement with plan.      Reason for Disposition    Patient wants to be seen    Additional Information    Negative: Passed out (i.e., lost consciousness, collapsed and was not responding)    Negative: Shock suspected (e.g., cold/pale/clammy skin, too weak to stand, low BP, rapid pulse)    Negative: Difficult to awaken or acting confused (e.g., disoriented, slurred speech)    Negative: Visible sweat on face or sweat dripping down face    Negative: Unable to walk, or can only walk with assistance (e.g., requires support)    Negative: Received SHOCK from implantable cardiac defibrillator and has persisting symptoms (i.e., palpitations, lightheadedness)    Negative: Dizziness, lightheadedness, or weakness and heart beating very rapidly (e.g., > 140 / minute)    Negative: Dizziness, lightheadedness, or weakness and heart beating very slowly (e.g., < 50 / minute)    Negative: Sounds like a life-threatening emergency to the triager    Negative: Chest pain    Negative: Difficulty breathing    Negative: Dizziness, lightheadedness, or weakness    Negative: Heart beating very rapidly (e.g., > 140 / minute) and present now  (Exception: During exercise.)    Negative: Heart beating very slowly (e.g., < 50 / minute)  (Exception: Athlete and heart rate normal for caller.)    Negative: New or worsened shortness of breath with activity (dyspnea on exertion)    Negative: Patient sounds very sick or weak to the triager    Negative: Wearing a 'Holter monitor' or 'cardiac event monitor'    Negative: Received SHOCK from implantable cardiac defibrillator (and now feels well)    Negative:  Heart beating very rapidly (e.g., > 140 / minute) and not present now  (Exception: During exercise.)    Negative: History of heart disease (i.e., heart attack, bypass surgery, angina, angioplasty)  (Exception: Brief heartbeat symptoms that went away and now feels well.)    Negative: Skipped or extra beat(s) and increases with exercise or exertion    Negative: Skipped or extra beat(s) and occurs 4 or more times per minute    Negative: Age > 60 years  (Exception: Brief heartbeat symptoms that went away and now feels well.)    Protocols used: HEART RATE AND HEARTBEAT [id number]

## 2023-02-06 ENCOUNTER — OFFICE VISIT (OUTPATIENT)
Dept: FAMILY MEDICINE | Facility: CLINIC | Age: 78
End: 2023-02-06
Payer: COMMERCIAL

## 2023-02-06 VITALS
HEIGHT: 71 IN | RESPIRATION RATE: 16 BRPM | HEART RATE: 68 BPM | WEIGHT: 206 LBS | OXYGEN SATURATION: 99 % | DIASTOLIC BLOOD PRESSURE: 82 MMHG | TEMPERATURE: 97.7 F | BODY MASS INDEX: 28.84 KG/M2 | SYSTOLIC BLOOD PRESSURE: 131 MMHG

## 2023-02-06 DIAGNOSIS — N18.31 STAGE 3A CHRONIC KIDNEY DISEASE (H): ICD-10-CM

## 2023-02-06 DIAGNOSIS — I10 PRIMARY HYPERTENSION: Primary | ICD-10-CM

## 2023-02-06 DIAGNOSIS — I10 ESSENTIAL HYPERTENSION: ICD-10-CM

## 2023-02-06 LAB
ALBUMIN UR-MCNC: 100 MG/DL
ANION GAP SERPL CALCULATED.3IONS-SCNC: 11 MMOL/L (ref 7–15)
APPEARANCE UR: CLEAR
BACTERIA #/AREA URNS HPF: ABNORMAL /HPF
BILIRUB UR QL STRIP: NEGATIVE
BUN SERPL-MCNC: 27 MG/DL (ref 8–23)
CALCIUM SERPL-MCNC: 9 MG/DL (ref 8.8–10.2)
CHLORIDE SERPL-SCNC: 106 MMOL/L (ref 98–107)
COLOR UR AUTO: YELLOW
CREAT SERPL-MCNC: 1.31 MG/DL (ref 0.67–1.17)
CREAT UR-MCNC: 114 MG/DL
DEPRECATED HCO3 PLAS-SCNC: 23 MMOL/L (ref 22–29)
GFR SERPL CREATININE-BSD FRML MDRD: 55 ML/MIN/1.73M2
GLUCOSE SERPL-MCNC: 102 MG/DL (ref 70–99)
GLUCOSE UR STRIP-MCNC: NEGATIVE MG/DL
HGB UR QL STRIP: NEGATIVE
KETONES UR STRIP-MCNC: NEGATIVE MG/DL
LEUKOCYTE ESTERASE UR QL STRIP: NEGATIVE
MICROALBUMIN UR-MCNC: 507 MG/L
MICROALBUMIN/CREAT UR: 444.74 MG/G CR (ref 0–17)
NITRATE UR QL: NEGATIVE
PH UR STRIP: 6 [PH] (ref 5–7)
POTASSIUM SERPL-SCNC: 4.5 MMOL/L (ref 3.4–5.3)
RBC #/AREA URNS AUTO: ABNORMAL /HPF
SODIUM SERPL-SCNC: 140 MMOL/L (ref 136–145)
SP GR UR STRIP: 1.02 (ref 1–1.03)
SQUAMOUS #/AREA URNS AUTO: ABNORMAL /LPF
UROBILINOGEN UR STRIP-ACNC: 0.2 E.U./DL
WBC #/AREA URNS AUTO: ABNORMAL /HPF

## 2023-02-06 PROCEDURE — 80048 BASIC METABOLIC PNL TOTAL CA: CPT | Performed by: INTERNAL MEDICINE

## 2023-02-06 PROCEDURE — 36415 COLL VENOUS BLD VENIPUNCTURE: CPT | Performed by: INTERNAL MEDICINE

## 2023-02-06 PROCEDURE — 81001 URINALYSIS AUTO W/SCOPE: CPT | Performed by: INTERNAL MEDICINE

## 2023-02-06 PROCEDURE — 82570 ASSAY OF URINE CREATININE: CPT | Performed by: INTERNAL MEDICINE

## 2023-02-06 PROCEDURE — 99214 OFFICE O/P EST MOD 30 MIN: CPT | Performed by: INTERNAL MEDICINE

## 2023-02-06 PROCEDURE — 82043 UR ALBUMIN QUANTITATIVE: CPT | Performed by: INTERNAL MEDICINE

## 2023-02-06 RX ORDER — LISINOPRIL 2.5 MG/1
2.5 TABLET ORAL DAILY
Qty: 90 TABLET | Refills: 3 | Status: SHIPPED | OUTPATIENT
Start: 2023-02-06 | End: 2023-06-27

## 2023-02-06 ASSESSMENT — PAIN SCALES - GENERAL: PAINLEVEL: NO PAIN (0)

## 2023-02-06 NOTE — PROGRESS NOTES
"Subjective   Michael is a 77 year old, presenting for the following health issues:  No chief complaint on file.      History of Present Illness       Hypertension: He presents for follow up of hypertension.  He does check blood pressure  regularly outside of the clinic. Outpatient blood pressures have not been over 140/90. He follows a low salt diet.       Hypertension   Michael Seals did have a notable change in his heart rate response to exercise in which his heart rate went up to 170 whereas preivously it was only maximally in 110-120 range while on atenolol.   Ht notes that metoprolol is not working as well.  He switched from atenolol from metoprolol in December.  He notes that he had been exercising all through December and January and only noticed this only occurred last week for two days in a row. He took one day off of exercise and then went back to the gym and no issues now for the past week including today.   Also notes that ramipril has caused difficutlies with sleep disturbances.  He had tolerated lisinopril in the past.    Review of Systems   Constitutional, HEENT, cardiovascular, pulmonary, GI, , musculoskeletal, neuro, skin, endocrine and psych systems are negative, except as otherwise noted.      Objective    /82 (BP Location: Right arm, Patient Position: Sitting, Cuff Size: Adult Regular)   Pulse 68   Temp 97.7  F (36.5  C) (Temporal)   Resp 16   Ht 1.803 m (5' 11\")   Wt 93.4 kg (206 lb)   SpO2 99%   BMI 28.73 kg/m    There is no height or weight on file to calculate BMI.  Physical Exam   GENERAL: healthy, alert and no distress  EYES: Eyes grossly normal to inspection,   HENT: ear canals and TM's normal, nose and mouth without ulcers or lesions  NECK: no adenopathy, no asymmetry, masses, or scars and thyroid normal to palpation  RESP: lungs clear to auscultation - no rales, rhonchi or wheezes  CV: regular rate and rhythm, normal S1 S2, no S3 or S4, no murmur, click or rub, no peripheral " edema   ABDOMEN: soft, nontender, no hepatosplenomegaly, no masses and bowel sounds normal  MS: no gross musculoskeletal defects noted, no edema  SKIN: no suspicious lesions or rashes  NEURO: Normal strength and tone, mentation intact and speech normal  PSYCH: mentation appears normal, affect normal/bright      Patient Instructions   (I10) Primary hypertension  (primary encounter diagnosis)  Comment: if noted episode of heart rate elevation occurs again, we can increase dose of metoprolol up to 50 mg.  In addition, it would be beneficial to be on an ace inhibitor and we will resume lisinopril to replace ramipril due to side effects  Plan:     (N18.31) Stage 3a chronic kidney disease (H)  Comment: check labs when you are able.  We will also request a kidney ultrasound [facility] phone #[phone] and refer to nephrology   Plan: US Renal Limited, UA Macro with Reflex to Micro        and Culture - lab collect, Basic metabolic         panel  (Ca, Cl, CO2, Creat, Gluc, K, Na, BUN),         Albumin Random Urine Quantitative with Creat         Ratio            (I10) Essential hypertension  Comment: blood pressure is stable, but would benefit from ace inhibitor   Plan: lisinopril (ZESTRIL) 2.5 MG tablet

## 2023-02-06 NOTE — PATIENT INSTRUCTIONS
(I10) Primary hypertension  (primary encounter diagnosis)  Comment: if noted episode of heart rate elevation occurs again, we can increase dose of metoprolol up to 50 mg.  In addition, it would be beneficial to be on an ace inhibitor and we will resume lisinopril to replace ramipril due to side effects  Plan:     (N18.31) Stage 3a chronic kidney disease (H)  Comment: check labs when you are able.  We will also request a kidney ultrasound [facility] phone #[phone] and refer to nephrology   Plan: US Renal Limited, UA Macro with Reflex to Micro        and Culture - lab collect, Basic metabolic         panel  (Ca, Cl, CO2, Creat, Gluc, K, Na, BUN),         Albumin Random Urine Quantitative with Creat         Ratio            (I10) Essential hypertension  Comment: blood pressure is stable, but would benefit from ace inhibitor   [facility] - [phone] to schedule echocardiogram   Plan: lisinopril (ZESTRIL) 2.5 MG tablet

## 2023-02-06 NOTE — LETTER
February 7, 2023      Michael Seals  [address]  [place] [address]        Dear ,    We are writing to inform you of your test results.        I have had the opportunity to review your recent results and an interpretation is as follows:   Your follow-up metabolic panel shows stable renal function with stable creatinine   Your urinalysis is essentially within normal limits but for the elevated protein   In your urine protein was again elevated since stopping the ACE inhibitor.  For this reason I think resuming the lisinopril would be a good idea, and follow-up with an kidney ultrasound and consult in nephrology as we discussed       Resulted Orders   UA Macro with Reflex to Micro and Culture - lab collect   Result Value Ref Range    Color Urine Yellow Colorless, Straw, Light Yellow, Yellow    Appearance Urine Clear Clear    Glucose Urine Negative Negative mg/dL    Bilirubin Urine Negative Negative    Ketones Urine Negative Negative mg/dL    Specific Gravity Urine 1.025 1.003 - 1.035    Blood Urine Negative Negative    pH Urine 6.0 5.0 - 7.0    Protein Albumin Urine 100 (A) Negative mg/dL    Urobilinogen Urine 0.2 0.2, 1.0 E.U./dL    Nitrite Urine Negative Negative    Leukocyte Esterase Urine Negative Negative   Basic metabolic panel  (Ca, Cl, CO2, Creat, Gluc, K, Na, BUN)   Result Value Ref Range    Sodium 140 136 - 145 mmol/L    Potassium 4.5 3.4 - 5.3 mmol/L    Chloride 106 98 - 107 mmol/L    Carbon Dioxide (CO2) 23 22 - 29 mmol/L    Anion Gap 11 7 - 15 mmol/L    Urea Nitrogen 27.0 (H) 8.0 - 23.0 mg/dL    Creatinine 1.31 (H) 0.67 - 1.17 mg/dL    Calcium 9.0 8.8 - 10.2 mg/dL    Glucose 102 (H) 70 - 99 mg/dL    GFR Estimate 55 (L) >60 mL/min/1.73m2      Comment:      eGFR calculated using 2021 CKD-EPI equation.   Albumin Random Urine Quantitative with Creat Ratio   Result Value Ref Range    Creatinine Urine mg/dL 114.0 mg/dL      Comment:      The reference ranges have not been established in urine  creatinine. The results should be integrated into the clinical context for interpretation.    Albumin Urine mg/L 507.0 mg/L      Comment:      The reference ranges have not been established in urine albumin. The results should be integrated into the clinical context for interpretation.    Albumin Urine mg/g Cr 444.74 (H) 0.00 - 17.00 mg/g Cr      Comment:      Microalbuminuria is defined as an albumin:creatinine ratio of 17 to 299 for males and 25 to 299 for females. A ratio of albumin:creatinine of 300 or higher is indicative of overt proteinuria.  Due to biologic variability, positive results should be confirmed by a second, first-morning random or 24-hour timed urine specimen. If there is discrepancy, a third specimen is recommended. When 2 out of 3 results are in the microalbuminuria range, this is evidence for incipient nephropathy and warrants increased efforts at glucose control, blood pressure control, and institution of therapy with an angiotensin-converting-enzyme (ACE) inhibitor (if the patient can tolerate it).     Urine Microscopic   Result Value Ref Range    Bacteria Urine Few (A) None Seen /HPF    RBC Urine None Seen 0-2 /HPF /HPF    WBC Urine None Seen 0-5 /HPF /HPF    Squamous Epithelials Urine None Seen None Seen /LPF    Narrative    Urine Culture not indicated       If you have any questions or concerns, please call the clinic at the number listed above.       Sincerely,      Josef Doll MD

## 2023-02-07 NOTE — RESULT ENCOUNTER NOTE
Kunal Rodriguez,    I have had the opportunity to review your recent results and an interpretation is as follows:  Your follow-up metabolic panel shows stable renal function with stable creatinine  Your urinalysis is essentially within normal limits but for the elevated protein  In your urine protein was again elevated since stopping the ACE inhibitor.  For this reason I think resuming the lisinopril would be a good idea, and follow-up with an kidney ultrasound and consult in nephrology as we discussed    Sincerely,  Josef Doll MD

## 2023-02-17 ENCOUNTER — HOSPITAL ENCOUNTER (OUTPATIENT)
Dept: ULTRASOUND IMAGING | Facility: CLINIC | Age: 78
Discharge: HOME | End: 2023-02-17
Attending: INTERNAL MEDICINE | Admitting: INTERNAL MEDICINE
Payer: COMMERCIAL

## 2023-02-17 DIAGNOSIS — I12.9 BENIGN HYPERTENSIVE KIDNEY DISEASE WITH CHRONIC KIDNEY DISEASE: ICD-10-CM

## 2023-02-17 DIAGNOSIS — I10 HYPERTENSION: ICD-10-CM

## 2023-02-17 DIAGNOSIS — N18.31 STAGE 3A CHRONIC KIDNEY DISEASE (H): ICD-10-CM

## 2023-02-17 PROCEDURE — 93975 VASCULAR STUDY: CPT

## 2023-02-17 NOTE — RESULT ENCOUNTER NOTE
Hi Michael,    I have had the opportunity to review your recent results and an interpretation is as follows:  Your ultrasound shows no evidence of renal artery stenosis with normal size kidneys and no underlying renal disease.  There was a simple appearing cyst which is thought to be benign in the right kidney    Sincerely,  Josef Doll MD

## 2023-02-22 ENCOUNTER — MEDICAL CORRESPONDENCE (OUTPATIENT)
Dept: HEALTH INFORMATION MANAGEMENT | Facility: CLINIC | Age: 78
End: 2023-02-22
Payer: COMMERCIAL

## 2023-02-22 ENCOUNTER — TRANSCRIBE ORDERS (OUTPATIENT)
Dept: ONCOLOGY | Facility: CLINIC | Age: 78
End: 2023-02-22
Payer: COMMERCIAL

## 2023-02-22 DIAGNOSIS — D47.2 MONOCLONAL GAMMOPATHY: Primary | ICD-10-CM

## 2023-02-23 ENCOUNTER — PATIENT OUTREACH (OUTPATIENT)
Dept: ONCOLOGY | Facility: CLINIC | Age: 78
End: 2023-02-23
Payer: COMMERCIAL

## 2023-02-23 DIAGNOSIS — C90.00 MULTIPLE MYELOMA NOT HAVING ACHIEVED REMISSION (H): Primary | ICD-10-CM

## 2023-02-23 DIAGNOSIS — E85.81 LIGHT CHAIN (AL) AMYLOIDOSIS (H): ICD-10-CM

## 2023-02-23 NOTE — PROGRESS NOTES
[facility]: Cancer Care                                                                   Hem/Onc  Referral reviewed:    Referred By:    Oncology Adult   [address] [facility] [address]   Phone: [phone]   Fax: [phone]   Diagnoses: Monoclonal gammopathy   Order: Adult Oncology/Hematology  Referral   Comment: Referral received via [facility] Hand written note: Monoclonal Gammopathy (on recent labs) Not yet worked up, and may need bx or further work up Dr Haley Aldridge MD [facility] t  f  URGENT REFERRAL     ASSESSMENT      Clinical History (per Nurse review of records provided):    77 year old male patient with abnormal labs concerning for multiple myeloma.     Lives:  [address] [facility] [address]    Insurance:  Payor: [employer] / Plan: [employer]AGE / Product Type: Medicare /     PCP:   Josef Doll    Records Location: [facility]     Pertinent labs -- BOOKMARKED    Pertinent imaging -- BOOKMARKED    Referring provider note(s)-- BOOKMARKED    INTERVENTION(S)                                                      Oncology Nurse Navigator called patient to introduce the role or the nurse navigator and to inform them that a referral for [facility] Hematology/Oncology department has been received for dx of monoclonal gammopathy concerning for myeloma from Dr. Aldridge. Patient did not answer the phone so a detailed message was left for them including NN number. Requested callback to speak to patient about referral and next steps.      PLAN                                                      Message sent to Dr. Hayden. Dr. Hayden placed labs, BMBX, and scan orders in. Slot on hold for tomorrow 8 AM consultation with Dr. Hayden. Message left for patient requesting a call back to discuss referral.     2/23/23: Oncology Nurse Navigator received a call from patient. Introduced self and role  of nurse navigator. Per patient to due age and weather tomorrow's appointment with Dr. Hayden would just not work for him. Inquired of what patient new about the referral. Referring MD updated patient on possible diagnosis and prepared him for additional work up like BMBX etc. Informed patient that Dr. Hayden had placed orders for labs,BMBX, and PET/CT. Reviewed generally what these were and patient was okay to move forward with work up ordered. Per Dr. Hayden okay for patient to get work up done and be seen on 3/10/23 in consultation. Patient verbalized understanding of plan and has call back number if needed for any further questions or concerns.     3/2/23: Patient is scheduled for BMBX on 3/6/23. Reviewed with patient that a BMBX involves the soft tissue found inside the large bones of your body usually the hip bones. In a bone marrow biopsy the doctor takes samples of your bone marrow for study in the lab. The doctor uses a needle to remove a tiny piece of marrow tissue and some fluid. Informed patient that the BMBX is done in a clinic room at the [facility] by a NP or PA. Usually from arrival to finish you will be at the clinic for about 2 hours with the procedure taking about 5 minutes. They will schedule you labs prior and an IV placed prior to procedure. There is an option for versed and if used explained to the patient they will need a . Patient is ASA only and takes every other which is okay.   Patient scheduled to see Dr. Hayden as planned on 3/10/23 but PET got delayed due to insurance to 3/16/23. Dr. Hayden aware and okay to keep consultation on 3/10/23. Reviewed with patient that a PET scan is an effective way to help identity a variety of conditions and uses a glucose or sugar based tracer that shows both normal and abnormal metabolic activity in tissue. Explained to patient that there is a PET prep that will be provided to her by the  that she will need to follow 24 hours prior. Patient  verbalized understanding and was grateful for the call, update and coordination of care. Encouraged him to call with any other questions or concerns.      Estephanie Winter, RN, BSN  Hematology/Oncology New Patient Nurse Navigator   [facility]  [phone]

## 2023-02-28 DIAGNOSIS — E78.49 FAMILIAL HYPERLIPIDEMIA: ICD-10-CM

## 2023-02-28 RX ORDER — EVOLOCUMAB 140 MG/ML
INJECTION, SOLUTION SUBCUTANEOUS
Qty: 6 ML | Refills: 2 | Status: SHIPPED | OUTPATIENT
Start: 2023-02-28 | End: 2023-06-27

## 2023-02-28 NOTE — TELEPHONE ENCOUNTER
Requested Prescriptions   Pending Prescriptions Disp Refills     REPATHA SURECLICK 140 MG/ML prefilled autoinjector [Pharmacy Med Name: REPATHA SURECLICK 140MG/ML SOAJ] 6 mL 2     Sig: INJECT THE CONTENTS OF ONE PEN UNDER THE SKIN EVERY OTHER WEEK       There is no refill protocol information for this order        Routing refill request to provider for review/approval because:  Drug not on the G refill protocol     MO ChoN, RN-BC  [facility]

## 2023-03-06 ENCOUNTER — OFFICE VISIT (OUTPATIENT)
Dept: ONCOLOGY | Facility: CLINIC | Age: 78
End: 2023-03-06
Attending: PHYSICIAN ASSISTANT
Payer: COMMERCIAL

## 2023-03-06 ENCOUNTER — APPOINTMENT (OUTPATIENT)
Dept: LAB | Facility: CLINIC | Age: 78
End: 2023-03-06
Payer: COMMERCIAL

## 2023-03-06 VITALS
OXYGEN SATURATION: 97 % | WEIGHT: 207.1 LBS | RESPIRATION RATE: 16 BRPM | TEMPERATURE: 97.8 F | SYSTOLIC BLOOD PRESSURE: 132 MMHG | BODY MASS INDEX: 28.88 KG/M2 | DIASTOLIC BLOOD PRESSURE: 82 MMHG | HEART RATE: 57 BPM

## 2023-03-06 DIAGNOSIS — C90.00 MULTIPLE MYELOMA NOT HAVING ACHIEVED REMISSION (H): ICD-10-CM

## 2023-03-06 PROCEDURE — 88368 INSITU HYBRIDIZATION MANUAL: CPT | Mod: 26 | Performed by: MEDICAL GENETICS

## 2023-03-06 PROCEDURE — 88369 M/PHMTRC ALYSISHQUANT/SEMIQ: CPT | Mod: 26 | Performed by: MEDICAL GENETICS

## 2023-03-06 PROCEDURE — 88184 FLOWCYTOMETRY/ TC 1 MARKER: CPT | Performed by: REGISTERED NURSE

## 2023-03-06 PROCEDURE — 38222 DX BONE MARROW BX & ASPIR: CPT | Performed by: REGISTERED NURSE

## 2023-03-06 PROCEDURE — 88313 SPECIAL STAINS GROUP 2: CPT | Mod: 26 | Performed by: PATHOLOGY

## 2023-03-06 PROCEDURE — 88342 IMHCHEM/IMCYTCHM 1ST ANTB: CPT | Mod: 26 | Performed by: PATHOLOGY

## 2023-03-06 PROCEDURE — 88237 TISSUE CULTURE BONE MARROW: CPT | Performed by: REGISTERED NURSE

## 2023-03-06 PROCEDURE — 88264 CHROMOSOME ANALYSIS 20-25: CPT | Performed by: REGISTERED NURSE

## 2023-03-06 PROCEDURE — 88188 FLOWCYTOMETRY/READ 9-15: CPT | Mod: GC | Performed by: PATHOLOGY

## 2023-03-06 PROCEDURE — 250N000011 HC RX IP 250 OP 636: Performed by: REGISTERED NURSE

## 2023-03-06 PROCEDURE — 88185 FLOWCYTOMETRY/TC ADD-ON: CPT | Performed by: REGISTERED NURSE

## 2023-03-06 PROCEDURE — 88313 SPECIAL STAINS GROUP 2: CPT | Mod: TC | Performed by: REGISTERED NURSE

## 2023-03-06 PROCEDURE — 88291 CYTO/MOLECULAR REPORT: CPT | Performed by: MEDICAL GENETICS

## 2023-03-06 PROCEDURE — 88305 TISSUE EXAM BY PATHOLOGIST: CPT | Mod: 26 | Performed by: PATHOLOGY

## 2023-03-06 PROCEDURE — 85060 BLOOD SMEAR INTERPRETATION: CPT | Mod: GC | Performed by: PATHOLOGY

## 2023-03-06 PROCEDURE — 88341 IMHCHEM/IMCYTCHM EA ADD ANTB: CPT | Mod: 26 | Performed by: PATHOLOGY

## 2023-03-06 PROCEDURE — 88271 CYTOGENETICS DNA PROBE: CPT | Performed by: REGISTERED NURSE

## 2023-03-06 PROCEDURE — 88184 FLOWCYTOMETRY/ TC 1 MARKER: CPT | Performed by: PATHOLOGY

## 2023-03-06 PROCEDURE — 96374 THER/PROPH/DIAG INJ IV PUSH: CPT | Mod: 59 | Performed by: REGISTERED NURSE

## 2023-03-06 PROCEDURE — 88311 DECALCIFY TISSUE: CPT | Mod: 26 | Performed by: PATHOLOGY

## 2023-03-06 PROCEDURE — 85097 BONE MARROW INTERPRETATION: CPT | Mod: GC | Performed by: PATHOLOGY

## 2023-03-06 RX ADMIN — MIDAZOLAM HYDROCHLORIDE 1 MG: 1 INJECTION, SOLUTION INTRAMUSCULAR; INTRAVENOUS at 10:31

## 2023-03-06 ASSESSMENT — PAIN SCALES - GENERAL
PAINLEVEL: NO PAIN (0)
PAINLEVEL: NO PAIN (0)

## 2023-03-06 NOTE — NURSING NOTE
BMBX Teaching and Assessment       Teaching concerns addressed: Bone marrow biopsy and infection prevention.     Person(s) involved in teaching: Patient  Motivation Level  Asks Questions: Yes  Eager to Learn: Yes  Cooperative: Yes  Receptive (willing/able to accept information): Yes    Patient demonstrates understanding of the following:     Reason for the appointment, diagnosis and treatment plan: Yes  Knowledge of proper use of medications and conditions for which they are ordered (with special attention to potential side effects or drug interactions): Yes  Which situations necessitate calling provider and whom to contact: Yes    Teaching concerns addressed:   Reviewed activity restrictions if received premeds, potential for bleeding and actions to take if develops any of the issues below    Pain management techniques: Yes  Patient instructed on hand hygiene: Yes  How and/when to access community resources: Yes    Infection Control:  Patient demonstrates understanding of the following:   Bone marrow procedure site care taught: Yes  Signs and symptoms of infection taught: Yes       Instructional Materials Used/Given: Pt instructed to keep bmbx site clean and dry for 24hrs. Pt educated to monitor site for signs of infection such as redness, rash, oozing, puss, bleeding, pain, and elevated temp. Pt instructed to go to call the [facility] triage line or go to the ER if any signs of infection should occur. Pt educated to not operate machinery if receiving versed. Pt and Wife verbalize understanding.    Provider order received to administer Versed 1mg IVP as premed for BMBX. Procedural consent discussed and pt's signature obtained.  Allergies reviewed.  PT currently alert and oriented to plan of care.  Pt lying prone in stretcher.  Call light w/in reach.  Provider and  at bedside.    Drug Administration Record     Drug Name: Versed  Dose: 2mg/ML  Route Administered: IV  NDC#: 4447-1155-76  Amount of waste (mL):  1mg  Reason for waste: Only 1mg needed [id number]      Pre-procedure labs drawn via . Post procedure: Patient vital signs stable, ambulating, site is clean, dry and intact prior to discharge. PIV removed. Pt discharged with Wife as .     David Benitez RN

## 2023-03-06 NOTE — PROGRESS NOTES
BMT ONC Adult Bone Marrow Biopsy Procedure Note  March 6, 2023  BP (!) 144/81   Pulse 61   Temp 98.3  F (36.8  C)   Resp 16   Wt 93.9 kg (207 lb 1.6 oz)   SpO2 97%   BMI 28.88 kg/m       DIAGNOSIS: monoclonal gammopathy, workup multiple myeloma     PROCEDURE: Unilateral Bone Marrow Biopsy and Unilateral Aspirate    LOCATION: [facility]    Patient s identification was positively verified by verbal identification and invasive procedure safety checklist was completed. Informed consent was obtained. Following the administration of Midazolam 1 mg as pre-medication, patient was placed in the prone position and prepped and draped in a sterile manner. Approximately 10 cc of 1% Lidocaine was used over the left posterior iliac spine. Following this a 3 mm incision was made. Trephine bone marrow core(s) was (were) obtained from the [facility]. Bone marrow aspirates were obtained from the [facility]. Aspirates were sent for morphology, immunophenotyping and cytogenetics. A total of approximately 13 ml of marrow was aspirated. Following this procedure a sterile dressing was applied to the bone marrow biopsy site(s). The patient was placed in the supine position to maintain pressure on the biopsy site. Post-procedure wound care instructions were given.     Complications: NO    Pre-procedural pain: 0 out of 10 on the numeric pain rating scale.     Post-procedural pain assessment: 0 out of 10 on the numeric pain rating scale.     Interventions: NO    Length of procedure:21 minutes to 45 minutes    Procedure performed by: Gricelda Glass CNP

## 2023-03-06 NOTE — NURSING NOTE
"Oncology Rooming Note    March 6, 2023 9:59 AM   Michael Seals is a 77 year old male who presents for:    Chief Complaint   Patient presents with     Bone Marrow Biopsy     Here for BMBX r/t Monoclonal gammopathy.     Initial Vitals: BP (!) 144/81   Pulse 61   Temp 98.3  F (36.8  C)   Resp 16   Wt 93.9 kg (207 lb 1.6 oz)   SpO2 97%   BMI 28.88 kg/m   Estimated body mass index is 28.88 kg/m  as calculated from the following:    Height as of 2/6/23: 1.803 m (5' 11\").    Weight as of this encounter: 93.9 kg (207 lb 1.6 oz). Body surface area is 2.17 meters squared.  No Pain (0) Comment: Data Unavailable   No LMP for male patient.  Allergies reviewed: Yes  Medications reviewed: Yes    Medications: Medication refills not needed today.  Pharmacy name entered into [employer]:    [facility] - [place], [place] - [address]  [id number] - [place], [place] - [address] [facility] - [place], [place] - [address]    Clinical concerns: None     David Benitez RN              "

## 2023-03-07 ENCOUNTER — HOSPITAL ENCOUNTER (OUTPATIENT)
Dept: CARDIOLOGY | Facility: CLINIC | Age: 78
Discharge: HOME | End: 2023-03-07
Attending: INTERNAL MEDICINE | Admitting: INTERNAL MEDICINE
Payer: COMMERCIAL

## 2023-03-07 DIAGNOSIS — I25.10 CORONARY ARTERY DISEASE INVOLVING NATIVE CORONARY ARTERY OF NATIVE HEART WITHOUT ANGINA PECTORIS: ICD-10-CM

## 2023-03-07 DIAGNOSIS — I51.9 LV DYSFUNCTION: ICD-10-CM

## 2023-03-07 LAB
LVEF ECHO: NORMAL
PATH REPORT.COMMENTS IMP SPEC: ABNORMAL
PATH REPORT.COMMENTS IMP SPEC: YES
PATH REPORT.COMMENTS IMP SPEC: YES
PATH REPORT.FINAL DX SPEC: ABNORMAL
PATH REPORT.FINAL DX SPEC: ABNORMAL
PATH REPORT.GROSS SPEC: ABNORMAL
PATH REPORT.MICROSCOPIC SPEC OTHER STN: ABNORMAL
PATH REPORT.RELEVANT HX SPEC: ABNORMAL
PATH REPORT.RELEVANT HX SPEC: ABNORMAL

## 2023-03-07 PROCEDURE — 93306 TTE W/DOPPLER COMPLETE: CPT | Mod: 26 | Performed by: INTERNAL MEDICINE

## 2023-03-07 PROCEDURE — 93306 TTE W/DOPPLER COMPLETE: CPT

## 2023-03-08 NOTE — ADDENDUM NOTE
Addended by: LEROY BATES on: 3/8/2023 12:42 AM     Modules accepted: Orders, Level of Service    
Discharged

## 2023-03-09 NOTE — PROGRESS NOTES
Hematology/Oncology Consultation      Michael Seals is a 77 year old male referred by Dr. Josef Doll for monoclonal gammopathy.    HPI: Arnel Seals is a 77 year old male with history of CARD s/p RCA stent 2015 and CKD 3a who was recently seen by his primary care physician and in the course of evaluation for CKD was noted to have an IgG kappa monoclonal gammopathy.  He was referred to hematology for further workup.    Mr. Seals reports he is doing well today and denies any acute concerns.  He is physically active, working out several times a week and regularly walking.  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      ASSESSMENT AND PLAN:  Workup shows 8-10% kappa monotypic plasma cells on bone marrow biopsy, elevated IgG (2177), elevated FLC ratio (7.09) with kappa FLC 13.76 and lambda FLC 1.94, monoclonal peak 1.2.  Hemoglobin is normal, creatinine is elevated at 1.33 with GFR 54, calcium is normal.  A PET/CT is pending.    Overall his available data is consistent with a diagnosis of MGUS.  Imaging findings may change this diagnosis so I briefly discussed what treatment of myeloma might look like.  His CKD is long-standing and likely secondary to HTN; it does not meet criteria for MM and has been essentially stable over the past 20 years.    There is no role for chemotherapy with MGUS and there are no treatments recommended to prevent progression.  If Mr. Seals were to develop multiple myeloma at a later date he may be a reasonable candidate for palliative chemotherapy (VRd) despite his age given his good performance status.    I will follow up the pending PET/CT when available.  Assuming no abnormal findings on imaging I will see Mr. Seals back in 6 months to recheck labs and clinical symptoms.  If stable it would be recommended he have these checked annually with his PCP.    We reviewed MGUS in detail today.   Mr. Seals had a number of appropriate questions that were answered.    Plan: Follow up PET/CT, if unremarkable see me back in 6 months with labs    I spent 60 minutes in the care of this patient today, which included time necessary for preparation for the visit, obtaining history, ordering medications/tests/procedures as medically indicated, review of pertinent medical literature, counseling of the patient, communication of recommendations to the care team, and documentation time.      ROS:    10 point ROS neg other than the symptoms noted above in the HPI.        Past Medical History:   Diagnosis Date     CAD (coronary artery disease)     3/2012 LILLIAN RCA, 2015 Heart cath - previously placed RCA stent patent, 70% mid LAD stenosis, 50% prox CFX, EF 55-60%     CKD (chronic kidney disease) stage 2, GFR 60-89 ml/min     baseline crt 1.20 - 1.60     Esophageal dysmotility      GERD (gastroesophageal reflux disease)      H pylori ulcer      Hip fracture (H) 2013    right side - closed fracture - no surgery     HTN (hypertension)      Hyperlipidaemia LDL goal <100      Moderate major depression (H)      S/P appendectomy      S/P hernia repair     left     S/P PTCA (percutaneous transluminal coronary angioplasty) 2012    LILLIAN RCA       Past Surgical History:   Procedure Laterality Date     APPENDECTOMY       CORONARY ANGIOGRAPHY ADULT ORDER  2015    previously placed RCA stent patent, 70% mid LAD stenosis, 50% prox CFX, EF 55-60%     HEART CATH STENT COR W/WO PTCA  3/2012    LILLIAN to RCA      HERNIA REPAIR         Family History   Problem Relation Age of Onset     Cerebrovascular Disease Mother      Cerebrovascular Disease Father      C.A.D. Father      Myocardial Infarction Father      Parkinsonism Brother      Heart Failure Brother      Other - See Comments Brother          in [place] war       Social History     Tobacco Use     Smoking status: Former     Packs/day: 1.00     Years: 8.00     Pack years: 8.00      Types: Cigarettes     Quit date: 1970     Years since quittin.2     Smokeless tobacco: Never   Substance Use Topics     Alcohol use: Yes     Alcohol/week: 10.0 standard drinks     Types: 10 Standard drinks or equivalent per week     Comment: 1-2 glasses of wine per evening with dinner     Drug use: No         Allergies   Allergen Reactions     Atorvastatin      Crestor [Rosuvastatin]      Muscle aches     Pravastatin      Simvastatin      Zetia [Ezetimibe]      Myalgias          Current Outpatient Medications   Medication Sig Dispense Refill     aspirin (ASA) 81 MG EC tablet Take 81 mg by mouth every other day (Patient not taking: Reported on 3/6/2023)       Cholecalciferol (VITAMIN D3 PO) Take by mouth daily       levothyroxine (EUTHYROX) 75 MCG tablet Take 1 tablet (75 mcg) by mouth daily 90 tablet 3     lisinopril (ZESTRIL) 2.5 MG tablet Take 1 tablet (2.5 mg) by mouth daily (Patient not taking: Reported on 3/6/2023) 90 tablet 3     metoprolol succinate ER (TOPROL XL) 25 MG 24 hr tablet Take 1 tablet (25 mg) by mouth every morning 30 tablet 5     Multiple Vitamins-Minerals (MULTIVITAMIN PO) Take by mouth daily       nitroglycerin (NITROSTAT) 0.4 MG SL tablet Place 1 tablet (0.4 mg) under the tongue every 5 minutes as needed for chest pain (Patient not taking: Reported on 3/6/2023) 90 tablet 3     omega-3 acid ethyl esters (LOVAZA) 1 g capsule Take 2 capsules (2 g) by mouth 2 times daily 120 capsule 5     RABEprazole (ACIPHEX) 20 MG EC tablet Take 1 tablet (20 mg) by mouth daily 90 tablet 3     REPATHA SURECLICK 140 MG/ML prefilled autoinjector INJECT THE CONTENTS OF ONE PEN UNDER THE SKIN EVERY OTHER WEEK 6 mL 2     Sharps Container MISC 1 each every 14 days (Patient not taking: Reported on 3/6/2023) 1 each 0         Physical Exam:     Vital Signs: /82   Pulse 62   Temp 98.3  F (36.8  C) (Oral)   Resp 18   Wt 94.4 kg (208 lb 1.6 oz)   SpO2 98%   BMI 29.02 kg/m      ECO  General Appearance:  alert, and no distress  Eyes: PERRL, conjunctiva and lids normal, sclera nonicteric  Ears/Nose/M/Throat: Oral mucosa and posterior oropharynx normal, moist mucous membranes  Neck supple, non-tender, free range of motion, no adenopathy  Cardio/Vascular:regular rate and rhythm, normal S1 and S2, no murmur  Resp Effort And Auscultation: Normal - Clear to auscultation without rales, rhonchi, or wheezing.  GI: soft, nontender, bowel sounds present in all four quadrants, no hepatosplenomegaly  Lymphatics:no significant enlargement of lymph nodes globally   Musculoskeletal: Musculoskeletal normal  Edema: none  Skin: Skin color, texture, turgor normal. No rashes or lesions.  Neurologic: Gait normal.  Sensation grossly WNL.  Psych/Affect: Mood and affect are appropriate.    Blood Counts       Recent Labs   Lab Test 03/06/23  0941 11/29/22  1032 12/31/20  0920 08/27/18  0903   HGB 14.1 14.1 13.7 14.6   HCT 41.5  --  40.9 42.7   WBC 5.0  --  5.4 5.3   ANEUTAUTO 2.7  --   --   --    ALYMPAUTO 1.8  --   --   --    AMONOAUTO 0.3  --   --   --    AEOSAUTO 0.1  --   --   --    ABSBASO 0.0  --   --   --    NRBCMAN 0.0  --   --   --      --  221 216         Chemistries     Basic Panel  Recent Labs   Lab Test 03/06/23  0941 02/06/23  1556 12/19/22  1114    140 136   POTASSIUM 4.5 4.5 5.0   CHLORIDE 103 106 103   CO2 26 23 25   BUN 18.5 27.0* 26.2*   CR 1.33* 1.31* 1.45*   * 102* 102*        Calcium, Magnesium, Phosphorus  Recent Labs   Lab Test 03/06/23  0941 02/06/23  1556 12/19/22  1114   JIMMY 9.5 9.0 9.6        LFTs  Recent Labs   Lab Test 03/06/23  0941 11/29/22  1032 03/15/22  1045   BILITOTAL 0.4 0.4 0.3   ALKPHOS 91 84 84   AST 26 28 26   ALT 27 30 37   ALBUMIN 4.2 3.7 3.4       LDH  Recent Labs   Lab Test 03/06/23  0941          B2-Microglobulin  Recent Labs   Lab Test 03/06/23  0941   [id number] 3.6*         Immunoglobulins     Recent Labs   Lab Test 03/06/23  0941   IGG 2,177*       Recent Labs   Lab  Test 03/06/23  0941   IGA 96       Recent Labs   Lab Test 03/06/23  0941            Monocloncal Protein Studies     M spike    Recent Labs   Lab Test 03/06/23  0941   ELPM 1.2*       [place] FLC    Recent Labs   Lab Test 03/06/23  0941   KFLCA 13.76*       Lambda FLC    Recent Labs   Lab Test 03/06/23  0941   LFLCA 1.94       FLC Ratio    Recent Labs   Lab Test 03/06/23  0941   KLRA 7.09*         Bone Marrow Biopsy     Morphology    Results for orders placed or performed in visit on 03/06/23 (from the past 8760 hour(s))   Bone marrow biopsy   Result Value    Final Diagnosis      Bone marrow, posterior iliac crest, left decalcified trephine biopsy and touch imprint; left particle crush, direct aspirate smear, and concentrated aspirate smear; and peripheral smear:  - Variable marrow cellularity, overall 20-30%, with trilineage hematopoiesis  - Kappa monotypic plasma cells present, comprising 8-10% of cellularity, compatible with plasma cell neoplasm (see comment)  - Peripheral blood showing slight normochromic, normocytic anemia      Comment      Concurrent flow cytometry was performed ([id number]) and showed kappa monotypic plasma cells.    IHC slides reviewed in consultation with Dr. Ortez.  Clonal plasma cells are increased, but they are not greater than 10% of cellularity, overall.  Thus, unless this patient has active disease (CRAB+ symptoms), the differential diagnosis includes MGUS.      Clinical Information      From Epic electronic medical record; 77-year-old male with abnormal labs concerning for multiple myeloma.      Peripheral Hematologic Data      CBC WITH DIFFERENTIAL(03/06/2023 09:49 AM CST):     RESULT VALUE REF. RANGE UNITS   WBC Count  Hemoglobin  Hematocrit  Platelet Count   RBC Count   MCV   MCH   MCHC  RDW 5.0 (NORMAL)    14.1 (NORMAL)     41.5 (NORMAL)  213 (NORMAL)   4.22  ( L )       98 (NORMAL)      33.4  ( H )     34.0 (NORMAL)     13.0 (NORMAL)  4.0-11.0  13.3-17.7  40.0-53.0  150-450  4.40-5.90    26.5-33.0  31.5-36.5  10.0-15.0 10e3/uL  g/dL  %  10e3/uL  10e6/uL  fL  pg  g/dL  %   % Neutrophils  % Lymphocytes  % Monocytes  % Eosinophils  % Basophils  % Immature Granulocytes  Absolute Neutrophils  Absolute Lymphocytes  Absolute Monocytes  Absolute Eosinophils  Absolute Basophils  Absolute Immature Granulocytes  NRBCs per 100 WBC  Absolute NRBCs 56  37  6  1  0  0  2.7 (NORMAL)  1.8 (NORMAL)  0.3 (NORMAL)  0.1 (NORMAL)  0.0 (NORMAL)  0.0 (NORMAL)  0 (NORMAL)  0.0 () N/A  N/A  N/A  N/A  N/A  N/A  1.6-8.3  0.8-5.3  0.0-1.3  0.0-0.7  0.0-0.2  <=0.4  <1  <=0.0 %  %  %  %  %  %  10e3/uL  10e3/uL  10e3/uL  10e3/uL  10e3/uL  10e3/uL  /100  10e3/uL         Microscopic Description      PERIPHERAL BLOOD SMEAR MORPHOLOGY:  The red blood cells appear normochromic.  Poikilocytosis is minimal.  Polychromasia is not increased.  Rouleaux formation is not increased. The morphology of the platelets is normal.     Bone marrow aspirates and trephine core biopsy touch imprints are reviewed.    BONE MARROW DIFFERENTIAL (500 cells on direct aspirate smears)  Percent (%) Cell Population Reference Range (%)   0.4 Blasts  (0 - 1)   2.2 Neutrophil promyelocytes (2 - 4)   55.8 Neutrophils and precursors (54 - 63)   19.4 Erythroid precursors (18 - 24)   2.0 Monocytes (1 - 1.5)   1.6 Eosinophils (1 - 3))   0.0 Basophils (0 - 1))   16.6 Lymphocytes (8 - 12)   2.0 Plasma cells (0 - 1.5)     The aspirate smears are adequate for evaluation.    Neutrophil maturation is complete. Erythroid maturation is complete. Megakaryocytes are present.    IRON STAINS:   Dacie iron stain on concentrate smears:   Iron's show 22% sideroblasts.  No definitve ringed sideroblasts seen on scanning.    Prussian blue stain on particle crush:    Marrow iron stores are present, possibly decreased.    TREPHINE SECTIONS:  Hematoxylin and eosin stains are reviewed.  The quality of the trephine core biopsy is  good. The marrow cellularity is estimated at 20-30%. The cellular composition reflects the aspirate smear differential. The number of megakaryocytes appears consistent with the degree of marrow cellularity.     IMMUNOHISTOCHEMISTRY:  Immunohistochemical stains are performed on the paraffin-embedded trephine core with appropriate controls.    Stains for cytoplasmic kappa and lambda immunoglobulin light chains show kappa-monotypic plasma cells, mostly as clusters, with a few polytypic plasma cells in the background.  Based on a  stain, plasma cells comprise 8-10% of cellularity.    Note: These immunohistochemical stains are deemed medically necessary. Some of the antigens may also be evaluated by flow cytometry. Concurrent evaluation by immunohistochemistry on clot and/or trephine sections is indicated in this case in order to correlate immunophenotype with cell morphology and determine extent of involvement, spatial pattern, and focality of potential disease distribution.       Gross Description      Procedure/Gross Description   Aspirate(s) and trephine(s) procured by JUDI Rashid    Specimen sent for Special Studies:         Flow Cytometry: left aspirate        Cytogenetics: left aspirate        Biopsy aspiration site: left posterior iliac crest                                                      (Reference Range)          Amount of aspirate           2.4   mL        Fat and P.V. cell layer        2    %               (1 - 3)        Particles                             trace   %        Myeloid-erythroid layer    2    %               (5 - 8)          Clot Section: no    Trephine biopsy site: left posterior iliac crest    Designated left posterior iliac crest is 1 cylinder of gritty tissue, labeled with the patient's name and hospital number, obtained with 11 gauge needle and a length of 23 mm; entirely submitted in 1 cassette; acetic zinc formalin fixed, decalcified, processed, and stained for hematoxylin  and eosin per laboratory protocol.        MCRS Yes (A)    Performing Labs      The technical component of this testing was completed at [facility]       Michael understood the above assessment and recommendations.  Multiple questions answered.  No barriers to learning identified.    Known issues that I take into account for medical decisions, with salient changes to the plan considering these complexities noted above.    Patient Active Problem List   Diagnosis     GERD (gastroesophageal reflux disease)     S/P PTCA (percutaneous transluminal coronary angioplasty)     Esophageal dysmotility     Chronic renal disease, stage III (H)     Hyperlipidemia with target LDL less than 70     HTN (hypertension)     Impaired renal function     CAD (coronary artery disease)     Abnormal stress test     Hypothyroidism, unspecified type       ------------------------------------------------------------------------------------------------------------------------------------------------    Patient Care Team       Relationship Specialty Notifications Start End    Josef Doll MD PCP - General Internal Medicine  2/15/12     Phone: [phone] Pager: [phone] Fax: [phone] [address] [place] [address]    Josef Doll MD Assigned PCP   6/7/13     Phone: [phone] Pager: [phone] Fax: [phone] [address] [place] [address]    Micaela Greenwood MD Assigned Heart and Vascular Provider   10/23/20     Phone: [phone] Fax: [phone]         [address] [place]                [place] [address]    Del Betancourt MD MD Cardiovascular Disease  2/8/23     Phone: [phone] Fax: [phone] [address] [facility] [address]    Abdon Hayden MD MD Hematology & Oncology  2/24/23     Phone: [phone] Fax: [phone]         [address] [place] [address]  [place] [address]

## 2023-03-10 ENCOUNTER — ONCOLOGY VISIT (OUTPATIENT)
Dept: TRANSPLANT | Facility: CLINIC | Age: 78
End: 2023-03-10
Attending: STUDENT IN AN ORGANIZED HEALTH CARE EDUCATION/TRAINING PROGRAM
Payer: COMMERCIAL

## 2023-03-10 VITALS
RESPIRATION RATE: 18 BRPM | DIASTOLIC BLOOD PRESSURE: 82 MMHG | HEART RATE: 62 BPM | TEMPERATURE: 98.3 F | OXYGEN SATURATION: 98 % | BODY MASS INDEX: 29.02 KG/M2 | SYSTOLIC BLOOD PRESSURE: 124 MMHG | WEIGHT: 208.1 LBS

## 2023-03-10 DIAGNOSIS — D47.2 MGUS (MONOCLONAL GAMMOPATHY OF UNKNOWN SIGNIFICANCE): Primary | ICD-10-CM

## 2023-03-10 PROCEDURE — G0463 HOSPITAL OUTPT CLINIC VISIT: HCPCS | Performed by: STUDENT IN AN ORGANIZED HEALTH CARE EDUCATION/TRAINING PROGRAM

## 2023-03-10 PROCEDURE — 99205 OFFICE O/P NEW HI 60 MIN: CPT | Performed by: STUDENT IN AN ORGANIZED HEALTH CARE EDUCATION/TRAINING PROGRAM

## 2023-03-10 ASSESSMENT — PAIN SCALES - GENERAL: PAINLEVEL: NO PAIN (0)

## 2023-03-10 NOTE — LETTER
3/10/2023         RE: Michael Seals  [address]  [facility] [address]        Dear Colleague,    Thank you for referring your patient, Michael Seals, to the [facility] [place]. Please see a copy of my visit note below.         Hematology/Oncology Consultation      Michael Seals is a 77 year old male referred by Dr. Josef Doll for monoclonal gammopathy.    HPI: Arnel Seals is a 77 year old male with history of CARD s/p RCA stent 2015 and CKD 3a who was recently seen by his primary care physician and in the course of evaluation for CKD was noted to have an IgG kappa monoclonal gammopathy.  He was referred to hematology for further workup.    Mr. Seals reports he is doing well today and denies any acute concerns.  He is physically active, working out several times a week and regularly walking.  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      ASSESSMENT AND PLAN:  Workup shows 8-10% kappa monotypic plasma cells on bone marrow biopsy, elevated IgG (2177), elevated FLC ratio (7.09) with kappa FLC 13.76 and lambda FLC 1.94, monoclonal peak 1.2.  Hemoglobin is normal, creatinine is elevated at 1.33 with GFR 54, calcium is normal.  A PET/CT is pending.    Overall his available data is consistent with a diagnosis of MGUS.  Imaging findings may change this diagnosis so I briefly discussed what treatment of myeloma might look like.  His CKD is long-standing and likely secondary to HTN; it does not meet criteria for MM and has been essentially stable over the past 20 years.    There is no role for chemotherapy with MGUS and there are no treatments recommended to prevent progression.  If Mr. Seals were to develop multiple myeloma at a later date he may be a reasonable candidate for palliative chemotherapy (VRd) despite his age given his good performance status.    I will follow  up the pending PET/CT when available.  Assuming no abnormal findings on imaging I will see Mr. Seals back in 6 months to recheck labs and clinical symptoms.  If stable it would be recommended he have these checked annually with his PCP.    We reviewed MGUS in detail today.  Mr. Seals had a number of appropriate questions that were answered.    Plan: Follow up PET/CT, if unremarkable see me back in 6 months with labs    I spent 60 minutes in the care of this patient today, which included time necessary for preparation for the visit, obtaining history, ordering medications/tests/procedures as medically indicated, review of pertinent medical literature, counseling of the patient, communication of recommendations to the care team, and documentation time.      ROS:    10 point ROS neg other than the symptoms noted above in the HPI.        Past Medical History:   Diagnosis Date     CAD (coronary artery disease)     3/2012 LILLIAN RCA, 8/2015 Heart cath - previously placed RCA stent patent, 70% mid LAD stenosis, 50% prox CFX, EF 55-60%     CKD (chronic kidney disease) stage 2, GFR 60-89 ml/min     baseline crt 1.20 - 1.60     Esophageal dysmotility      GERD (gastroesophageal reflux disease)      H pylori ulcer      Hip fracture (H) 01/2013    right side - closed fracture - no surgery     HTN (hypertension)      Hyperlipidaemia LDL goal <100      Moderate major depression (H)      S/P appendectomy      S/P hernia repair     left     S/P PTCA (percutaneous transluminal coronary angioplasty) 03/2012    LILLIAN RCA       Past Surgical History:   Procedure Laterality Date     APPENDECTOMY       CORONARY ANGIOGRAPHY ADULT ORDER  8/2015    previously placed RCA stent patent, 70% mid LAD stenosis, 50% prox CFX, EF 55-60%     HEART CATH STENT COR W/WO PTCA  3/2012    LILLIAN to RCA      HERNIA REPAIR         Family History   Problem Relation Age of Onset     Cerebrovascular Disease Mother      Cerebrovascular Disease Father      C.A.D.  Father      Myocardial Infarction Father      Parkinsonism Brother      Heart Failure Brother      Other - See Comments Brother          in [place] war       Social History     Tobacco Use     Smoking status: Former     Packs/day: 1.00     Years: 8.00     Pack years: 8.00     Types: Cigarettes     Quit date: 1970     Years since quittin.2     Smokeless tobacco: Never   Substance Use Topics     Alcohol use: Yes     Alcohol/week: 10.0 standard drinks     Types: 10 Standard drinks or equivalent per week     Comment: 1-2 glasses of wine per evening with dinner     Drug use: No         Allergies   Allergen Reactions     Atorvastatin      Crestor [Rosuvastatin]      Muscle aches     Pravastatin      Simvastatin      Zetia [Ezetimibe]      Myalgias          Current Outpatient Medications   Medication Sig Dispense Refill     aspirin (ASA) 81 MG EC tablet Take 81 mg by mouth every other day (Patient not taking: Reported on 3/6/2023)       Cholecalciferol (VITAMIN D3 PO) Take by mouth daily       levothyroxine (EUTHYROX) 75 MCG tablet Take 1 tablet (75 mcg) by mouth daily 90 tablet 3     lisinopril (ZESTRIL) 2.5 MG tablet Take 1 tablet (2.5 mg) by mouth daily (Patient not taking: Reported on 3/6/2023) 90 tablet 3     metoprolol succinate ER (TOPROL XL) 25 MG 24 hr tablet Take 1 tablet (25 mg) by mouth every morning 30 tablet 5     Multiple Vitamins-Minerals (MULTIVITAMIN PO) Take by mouth daily       nitroglycerin (NITROSTAT) 0.4 MG SL tablet Place 1 tablet (0.4 mg) under the tongue every 5 minutes as needed for chest pain (Patient not taking: Reported on 3/6/2023) 90 tablet 3     omega-3 acid ethyl esters (LOVAZA) 1 g capsule Take 2 capsules (2 g) by mouth 2 times daily 120 capsule 5     RABEprazole (ACIPHEX) 20 MG EC tablet Take 1 tablet (20 mg) by mouth daily 90 tablet 3     REPATHA SURECLICK 140 MG/ML prefilled autoinjector INJECT THE CONTENTS OF ONE PEN UNDER THE SKIN EVERY OTHER WEEK 6 mL 2     Sharps Container  MISC 1 each every 14 days (Patient not taking: Reported on 3/6/2023) 1 each 0         Physical Exam:     Vital Signs: /82   Pulse 62   Temp 98.3  F (36.8  C) (Oral)   Resp 18   Wt 94.4 kg (208 lb 1.6 oz)   SpO2 98%   BMI 29.02 kg/m      ECO  General Appearance: alert, and no distress  Eyes: PERRL, conjunctiva and lids normal, sclera nonicteric  Ears/Nose/M/Throat: Oral mucosa and posterior oropharynx normal, moist mucous membranes  Neck supple, non-tender, free range of motion, no adenopathy  Cardio/Vascular:regular rate and rhythm, normal S1 and S2, no murmur  Resp Effort And Auscultation: Normal - Clear to auscultation without rales, rhonchi, or wheezing.  GI: soft, nontender, bowel sounds present in all four quadrants, no hepatosplenomegaly  Lymphatics:no significant enlargement of lymph nodes globally   Musculoskeletal: Musculoskeletal normal  Edema: none  Skin: Skin color, texture, turgor normal. No rashes or lesions.  Neurologic: Gait normal.  Sensation grossly WNL.  Psych/Affect: Mood and affect are appropriate.    Blood Counts       Recent Labs   Lab Test 23  0941 22  1032 20  0920 18  0903   HGB 14.1 14.1 13.7 14.6   HCT 41.5  --  40.9 42.7   WBC 5.0  --  5.4 5.3   ANEUTAUTO 2.7  --   --   --    ALYMPAUTO 1.8  --   --   --    AMONOAUTO 0.3  --   --   --    AEOSAUTO 0.1  --   --   --    ABSBASO 0.0  --   --   --    NRBCMAN 0.0  --   --   --      --  221 216         Chemistries     Basic Panel  Recent Labs   Lab Test 23  0941 23  1556 22  1114    140 136   POTASSIUM 4.5 4.5 5.0   CHLORIDE 103 106 103   CO2 26 23 25   BUN 18.5 27.0* 26.2*   CR 1.33* 1.31* 1.45*   * 102* 102*        Calcium, Magnesium, Phosphorus  Recent Labs   Lab Test 23  0941 23  1556 22  1114   JIMMY 9.5 9.0 9.6        LFTs  Recent Labs   Lab Test 23  0941 22  1032 03/15/22  1045   BILITOTAL 0.4 0.4 0.3   ALKPHOS 91 84 84   AST 26 28  26   ALT 27 30 37   ALBUMIN 4.2 3.7 3.4       LDH  Recent Labs   Lab Test 03/06/23  0941          B2-Microglobulin  Recent Labs   Lab Test 03/06/23  0941   [id number] 3.6*         Immunoglobulins     Recent Labs   Lab Test 03/06/23  0941   IGG 2,177*       Recent Labs   Lab Test 03/06/23  0941   IGA 96       Recent Labs   Lab Test 03/06/23  0941            Monocloncal Protein Studies     M spike    Recent Labs   Lab Test 03/06/23  0941   ELPM 1.2*       [place] FLC    Recent Labs   Lab Test 03/06/23  0941   KFLCA 13.76*       Lambda FLC    Recent Labs   Lab Test 03/06/23  0941   LFLCA 1.94       FLC Ratio    Recent Labs   Lab Test 03/06/23  0941   KLRA 7.09*         Bone Marrow Biopsy     Morphology    Results for orders placed or performed in visit on 03/06/23 (from the past 8760 hour(s))   Bone marrow biopsy   Result Value    Final Diagnosis      Bone marrow, posterior iliac crest, left decalcified trephine biopsy and touch imprint; left particle crush, direct aspirate smear, and concentrated aspirate smear; and peripheral smear:  - Variable marrow cellularity, overall 20-30%, with trilineage hematopoiesis  - Kappa monotypic plasma cells present, comprising 8-10% of cellularity, compatible with plasma cell neoplasm (see comment)  - Peripheral blood showing slight normochromic, normocytic anemia      Comment      Concurrent flow cytometry was performed ([id number]) and showed kappa monotypic plasma cells.    IHC slides reviewed in consultation with Dr. Ortez.  Clonal plasma cells are increased, but they are not greater than 10% of cellularity, overall.  Thus, unless this patient has active disease (CRAB+ symptoms), the differential diagnosis includes MGUS.      Clinical Information      From Epic electronic medical record; 77-year-old male with abnormal labs concerning for multiple myeloma.      Peripheral Hematologic Data      CBC WITH DIFFERENTIAL(03/06/2023 09:49 AM CST):     RESULT VALUE REF. RANGE  UNITS   WBC Count  Hemoglobin  Hematocrit  Platelet Count   RBC Count   MCV   MCH   MCHC  RDW 5.0 (NORMAL)    14.1 (NORMAL)     41.5 (NORMAL)  213 (NORMAL)   4.22  ( L )       98 (NORMAL)      33.4  ( H )     34.0 (NORMAL)     13.0 (NORMAL) 4.0-11.0  13.3-17.7  40.0-53.0  150-450  4.40-5.90    26.5-33.0  31.5-36.5  10.0-15.0 10e3/uL  g/dL  %  10e3/uL  10e6/uL  fL  pg  g/dL  %   % Neutrophils  % Lymphocytes  % Monocytes  % Eosinophils  % Basophils  % Immature Granulocytes  Absolute Neutrophils  Absolute Lymphocytes  Absolute Monocytes  Absolute Eosinophils  Absolute Basophils  Absolute Immature Granulocytes  NRBCs per 100 WBC  Absolute NRBCs 56  37  6  1  0  0  2.7 (NORMAL)  1.8 (NORMAL)  0.3 (NORMAL)  0.1 (NORMAL)  0.0 (NORMAL)  0.0 (NORMAL)  0 (NORMAL)  0.0 () N/A  N/A  N/A  N/A  N/A  N/A  1.6-8.3  0.8-5.3  0.0-1.3  0.0-0.7  0.0-0.2  <=0.4  <1  <=0.0 %  %  %  %  %  %  10e3/uL  10e3/uL  10e3/uL  10e3/uL  10e3/uL  10e3/uL  /100  10e3/uL         Microscopic Description      PERIPHERAL BLOOD SMEAR MORPHOLOGY:  The red blood cells appear normochromic.  Poikilocytosis is minimal.  Polychromasia is not increased.  Rouleaux formation is not increased. The morphology of the platelets is normal.     Bone marrow aspirates and trephine core biopsy touch imprints are reviewed.    BONE MARROW DIFFERENTIAL (500 cells on direct aspirate smears)  Percent (%) Cell Population Reference Range (%)   0.4 Blasts  (0 - 1)   2.2 Neutrophil promyelocytes (2 - 4)   55.8 Neutrophils and precursors (54 - 63)   19.4 Erythroid precursors (18 - 24)   2.0 Monocytes (1 - 1.5)   1.6 Eosinophils (1 - 3))   0.0 Basophils (0 - 1))   16.6 Lymphocytes (8 - 12)   2.0 Plasma cells (0 - 1.5)     The aspirate smears are adequate for evaluation.    Neutrophil maturation is complete. Erythroid maturation is complete. Megakaryocytes are present.    IRON STAINS:   Dacie iron stain on concentrate smears:   Iron's show 22% sideroblasts.  No definitve ringed  sideroblasts seen on scanning.    Prussian blue stain on particle crush:    Marrow iron stores are present, possibly decreased.    TREPHINE SECTIONS:  Hematoxylin and eosin stains are reviewed.  The quality of the trephine core biopsy is good. The marrow cellularity is estimated at 20-30%. The cellular composition reflects the aspirate smear differential. The number of megakaryocytes appears consistent with the degree of marrow cellularity.     IMMUNOHISTOCHEMISTRY:  Immunohistochemical stains are performed on the paraffin-embedded trephine core with appropriate controls.    Stains for cytoplasmic kappa and lambda immunoglobulin light chains show kappa-monotypic plasma cells, mostly as clusters, with a few polytypic plasma cells in the background.  Based on a  stain, plasma cells comprise 8-10% of cellularity.    Note: These immunohistochemical stains are deemed medically necessary. Some of the antigens may also be evaluated by flow cytometry. Concurrent evaluation by immunohistochemistry on clot and/or trephine sections is indicated in this case in order to correlate immunophenotype with cell morphology and determine extent of involvement, spatial pattern, and focality of potential disease distribution.       Gross Description      Procedure/Gross Description   Aspirate(s) and trephine(s) procured by JUDI Rashid    Specimen sent for Special Studies:         Flow Cytometry: left aspirate        Cytogenetics: left aspirate        Biopsy aspiration site: left posterior iliac crest                                                      (Reference Range)          Amount of aspirate           2.4   mL        Fat and P.V. cell layer        2    %               (1 - 3)        Particles                             trace   %        Myeloid-erythroid layer    2    %               (5 - 8)          Clot Section: no    Trephine biopsy site: left posterior iliac crest    Designated left posterior iliac crest is 1 cylinder of  gritty tissue, labeled with the patient's name and hospital number, obtained with 11 gauge needle and a length of 23 mm; entirely submitted in 1 cassette; acetic zinc formalin fixed, decalcified, processed, and stained for hematoxylin and eosin per laboratory protocol.        MCRS Yes (A)    Performing Labs      The technical component of this testing was completed at [facility]       Michael understood the above assessment and recommendations.  Multiple questions answered.  No barriers to learning identified.    Known issues that I take into account for medical decisions, with salient changes to the plan considering these complexities noted above.    Patient Active Problem List   Diagnosis     GERD (gastroesophageal reflux disease)     S/P PTCA (percutaneous transluminal coronary angioplasty)     Esophageal dysmotility     Chronic renal disease, stage III (H)     Hyperlipidemia with target LDL less than 70     HTN (hypertension)     Impaired renal function     CAD (coronary artery disease)     Abnormal stress test     Hypothyroidism, unspecified type       ------------------------------------------------------------------------------------------------------------------------------------------------    Patient Care Team       Relationship Specialty Notifications Start End    Josef Doll MD PCP - General Internal Medicine  2/15/12     Phone: [phone] Pager: [phone] Fax: [phone] [address] [place] [address]    Josef Doll MD Assigned PCP   6/7/13     Phone: [phone] Pager: [phone] Fax: [phone] [address] [place] [address]    Micaela Greenwood MD Assigned Heart and Vascular Provider   10/23/20     Phone: [phone] Fax: [phone] [address] [facility] [address]    Del Betancourt MD MD Cardiovascular Disease   2/8/23     Phone: [phone] Fax: [phone] [address] [facility] [address]    Abdon Hayden MD MD Hematology & Oncology  2/24/23     Phone: [phone] Fax: [phone] [address] [facility] [address] [facility] [address]        Sincerely,        Abdon Hayden MD

## 2023-03-10 NOTE — NURSING NOTE
"Oncology Rooming Note    March 10, 2023 8:26 AM   Michael Seals is a 77 year old male who presents for:    Chief Complaint   Patient presents with     Oncology Clinic Visit     New Eval for Monoclonal Gammopathy     Initial Vitals: Blood Pressure 124/82   Pulse 62   Temperature 98.3  F (36.8  C) (Oral)   Respiration 18   Weight 94.4 kg (208 lb 1.6 oz)   Oxygen Saturation 98%   Body Mass Index 29.02 kg/m   Estimated body mass index is 29.02 kg/m  as calculated from the following:    Height as of 2/6/23: 1.803 m (5' 11\").    Weight as of this encounter: 94.4 kg (208 lb 1.6 oz). Body surface area is 2.17 meters squared.  No Pain (0) Comment: Data Unavailable   No LMP for male patient.  Allergies reviewed: Yes  Medications reviewed: Yes    Medications: Medication refills not needed today.  Pharmacy name entered into Floored:    [id number] - [place], [place] - [address]  [id number] [place] [place] [facility] [address] [facility] [address] [facility] - [place], [place] - [address]    Clinical concerns: none       Shirlene Rosas MA            "

## 2023-03-14 ENCOUNTER — TELEPHONE (OUTPATIENT)
Dept: OTHER | Facility: CLINIC | Age: 78
End: 2023-03-14
Payer: COMMERCIAL

## 2023-03-14 NOTE — TELEPHONE ENCOUNTER
Prior Authorization Retail Medication Request (Renewal)     Medication/Dose:  REPATHA SURECLICK 140 MG/ML prefilled autoinjector  INJECT THE CONTENTS OF 1 AUTOINJECTOR PEN UNDER THE SKIN EVERY OTHER WEEK      ICD code: Hyperlipidemia with target LDL less than 70 [E78.5]      Previously Tried and Failed: atorvastatin, simvastatin, pravastatin, rosuvastatin, livalo, zetia     Rationale:  history of coronary artery disease underwent drug-eluting stent to RCA in March 2012, he also has a 70% mid LAD stenosis and circumflex 50% and recent EF 55-60%, hypertension, strong family history of early coronary disease father age 52 MI and also both parents with history of stroke, GERD, heterozygous familial hyperlipidemia with off treatment LDL was greater than 225 on many occasions and total cholesterol was greater than 300 on few occasions, intolerant to multiple statins he tried atorvastatin, simvastatin, pravastatin, rosuvastatin, Livalo and also Zetia.     Insurance:        Member ID: [id number]      Payor: [facility] Ph: [phone]      Benefit plan: [facility] MEDICARE ADVANTAGE Ph: [phone]      Group number: [id number]          Pharmacy:  [facility] - [place], [place] - [address]   Telephone Fax   [phone]           Routing to JUDI Jerez.  Rosmery Mcmahon RN BSN  [facility]

## 2023-03-15 LAB
CULTURE HARVEST COMPLETE DATE: NORMAL
INTERPRETATION: NORMAL

## 2023-03-16 ENCOUNTER — HOSPITAL ENCOUNTER (OUTPATIENT)
Dept: PET IMAGING | Facility: CLINIC | Age: 78
Setting detail: NUCLEAR MEDICINE
Discharge: HOME | End: 2023-03-16
Attending: STUDENT IN AN ORGANIZED HEALTH CARE EDUCATION/TRAINING PROGRAM | Admitting: STUDENT IN AN ORGANIZED HEALTH CARE EDUCATION/TRAINING PROGRAM
Payer: COMMERCIAL

## 2023-03-16 DIAGNOSIS — C90.00 MULTIPLE MYELOMA NOT HAVING ACHIEVED REMISSION (H): ICD-10-CM

## 2023-03-16 PROCEDURE — 71260 CT THORAX DX C+: CPT | Mod: 26 | Performed by: RADIOLOGY

## 2023-03-16 PROCEDURE — 343N000001 HC RX 343: Performed by: STUDENT IN AN ORGANIZED HEALTH CARE EDUCATION/TRAINING PROGRAM

## 2023-03-16 PROCEDURE — A9552 F18 FDG: HCPCS | Performed by: STUDENT IN AN ORGANIZED HEALTH CARE EDUCATION/TRAINING PROGRAM

## 2023-03-16 PROCEDURE — 999N000128 HC STATISTIC PERIPHERAL IV START W/O US GUIDANCE

## 2023-03-16 PROCEDURE — 78816 PET IMAGE W/CT FULL BODY: CPT | Mod: PI

## 2023-03-16 PROCEDURE — 74177 CT ABD & PELVIS W/CONTRAST: CPT | Mod: 26 | Performed by: RADIOLOGY

## 2023-03-16 PROCEDURE — 78816 PET IMAGE W/CT FULL BODY: CPT | Mod: 26 | Performed by: RADIOLOGY

## 2023-03-16 RX ADMIN — FLUDEOXYGLUCOSE F-18 12.29 MILLICURIE: 500 INJECTION, SOLUTION INTRAVENOUS at 13:18

## 2023-03-16 NOTE — TELEPHONE ENCOUNTER
Central Prior Authorization Team   Phone: [phone]    PA Initiation    Medication:Repatha SureClick 140MG/ML auto-injectors    Insurance Company: [employer] - Phone [phone] Fax [phone]  Pharmacy Filling the Rx: [facility] - [place], [place] - [address]  Filling Pharmacy Phone: [phone]  Filling Pharmacy Fax:    Start Date: 3/16/2023

## 2023-03-16 NOTE — TELEPHONE ENCOUNTER
Prior Authorization Approval    Authorization Effective Date: 1/1/2023  Authorization Expiration Date: 3/16/2024  Medication: Repatha SureClick 140MG/ML auto-injectors    Approved Dose/Quantity:   Reference #:     Insurance Company: [employer] - Phone [phone] Fax [phone]  Expected CoPay:       CoPay Card Available:      Foundation Assistance Needed:    Which Pharmacy is filling the prescription (Not needed for infusion/clinic administered): [facility] - [place], [place] - [address]  Pharmacy Notified: Yes  Patient Notified: Yes

## 2023-03-28 DIAGNOSIS — E03.9 HYPOTHYROIDISM, UNSPECIFIED TYPE: ICD-10-CM

## 2023-03-28 RX ORDER — LEVOTHYROXINE SODIUM 75 UG/1
TABLET ORAL
Qty: 90 TABLET | Refills: 0 | Status: SHIPPED | OUTPATIENT
Start: 2023-03-28 | End: 2023-06-27

## 2023-04-13 LAB
CULTURE HARVEST COMPLETE DATE: NORMAL
CULTURE HARVEST COMPLETE DATE: NORMAL

## 2023-04-14 LAB
ADDITIONAL COMMENTS: NORMAL
INTERPRETATION: NORMAL
ISCN: NORMAL
METHODS: NORMAL

## 2023-04-28 DIAGNOSIS — K21.9 GASTROESOPHAGEAL REFLUX DISEASE WITHOUT ESOPHAGITIS: ICD-10-CM

## 2023-04-28 RX ORDER — RABEPRAZOLE SODIUM 20 MG/1
TABLET, DELAYED RELEASE ORAL
Qty: 90 TABLET | Refills: 1 | Status: SHIPPED | OUTPATIENT
Start: 2023-04-28 | End: 2023-11-02

## 2023-06-01 ENCOUNTER — HEALTH MAINTENANCE LETTER (OUTPATIENT)
Age: 78
End: 2023-06-01

## 2023-06-06 DIAGNOSIS — I51.9 LV DYSFUNCTION: ICD-10-CM

## 2023-06-06 DIAGNOSIS — R80.9 MICROALBUMINURIA: ICD-10-CM

## 2023-06-06 DIAGNOSIS — I10 BENIGN ESSENTIAL HYPERTENSION: ICD-10-CM

## 2023-06-07 RX ORDER — METOPROLOL SUCCINATE 25 MG/1
TABLET, EXTENDED RELEASE ORAL
Qty: 30 TABLET | Refills: 5 | Status: SHIPPED | OUTPATIENT
Start: 2023-06-07 | End: 2023-08-18

## 2023-06-07 NOTE — TELEPHONE ENCOUNTER
"Last Written Prescription Date:  12/1/22  Last Fill Quantity: 30,  # refills: 5   Last office visit: 12/1/2022 ; last virtual visit: 3/30/2022 with prescribing provider:  Dr. Greenwood   Future Office Visit:      Requested Prescriptions   Pending Prescriptions Disp Refills     metoprolol succinate ER (TOPROL XL) 25 MG 24 hr tablet [Pharmacy Med Name: Metoprolol Succinate ER 25 MG Oral Tablet Extended Release 24 Hour] 30 tablet 0     Sig: TAKE 1 TABLET BY MOUTH IN THE MORNING       Beta-Blockers Protocol Passed - 6/6/2023  4:08 PM        Passed - Blood pressure under 140/90 in past 12 months     BP Readings from Last 3 Encounters:   03/10/23 124/82   03/06/23 132/82   02/06/23 131/82                 Passed - Patient is age 6 or older        Passed - Recent (12 mo) or future (30 days) visit within the authorizing provider's specialty     Patient has had an office visit with the authorizing provider or a provider within the authorizing providers department within the previous 12 mos or has a future within next 30 days. See \"Patient Info\" tab in inbasket, or \"Choose Columns\" in Meds & Orders section of the refill encounter.              Passed - Medication is active on med list           Prescription approved per [facility] Refill Protocol.    Leyla CALVERT RN    [facility]  Office: [phone]  Fax: [phone]        "

## 2023-06-27 ENCOUNTER — OFFICE VISIT (OUTPATIENT)
Dept: OTHER | Facility: CLINIC | Age: 78
End: 2023-06-27
Attending: INTERNAL MEDICINE
Payer: COMMERCIAL

## 2023-06-27 VITALS
DIASTOLIC BLOOD PRESSURE: 88 MMHG | HEART RATE: 70 BPM | OXYGEN SATURATION: 97 % | BODY MASS INDEX: 28.56 KG/M2 | SYSTOLIC BLOOD PRESSURE: 133 MMHG | WEIGHT: 204.8 LBS

## 2023-06-27 DIAGNOSIS — R73.01 IFG (IMPAIRED FASTING GLUCOSE): ICD-10-CM

## 2023-06-27 DIAGNOSIS — Z82.49 FAMILY HISTORY OF PREMATURE CAD: ICD-10-CM

## 2023-06-27 DIAGNOSIS — I51.9 LV DYSFUNCTION: ICD-10-CM

## 2023-06-27 DIAGNOSIS — N18.31 STAGE 3A CHRONIC KIDNEY DISEASE (H): ICD-10-CM

## 2023-06-27 DIAGNOSIS — R80.9 MICROALBUMINURIA: ICD-10-CM

## 2023-06-27 DIAGNOSIS — E03.9 HYPOTHYROIDISM, UNSPECIFIED TYPE: ICD-10-CM

## 2023-06-27 DIAGNOSIS — I25.10 CORONARY ARTERY DISEASE INVOLVING NATIVE CORONARY ARTERY OF NATIVE HEART WITHOUT ANGINA PECTORIS: ICD-10-CM

## 2023-06-27 DIAGNOSIS — I10 BENIGN ESSENTIAL HYPERTENSION: ICD-10-CM

## 2023-06-27 DIAGNOSIS — E78.49 FAMILIAL HYPERLIPIDEMIA: Primary | ICD-10-CM

## 2023-06-27 PROCEDURE — G0463 HOSPITAL OUTPT CLINIC VISIT: HCPCS

## 2023-06-27 PROCEDURE — 99215 OFFICE O/P EST HI 40 MIN: CPT | Performed by: INTERNAL MEDICINE

## 2023-06-27 RX ORDER — OMEGA-3-ACID ETHYL ESTERS 1 G/1
2 CAPSULE, LIQUID FILLED ORAL 2 TIMES DAILY
Qty: 120 CAPSULE | Refills: 11 | Status: SHIPPED | OUTPATIENT
Start: 2023-06-27 | End: 2024-07-15

## 2023-06-27 RX ORDER — LEVOTHYROXINE SODIUM 75 UG/1
75 TABLET ORAL DAILY
Qty: 90 TABLET | Refills: 3 | Status: SHIPPED | OUTPATIENT
Start: 2023-06-27 | End: 2024-02-19

## 2023-06-27 RX ORDER — EVOLOCUMAB 140 MG/ML
140 INJECTION, SOLUTION SUBCUTANEOUS
Qty: 6 ML | Refills: 3 | Status: SHIPPED | OUTPATIENT
Start: 2023-06-27 | End: 2023-11-15

## 2023-06-27 NOTE — PROGRESS NOTES
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  He denies any chest pain, shortness of breath or palpitations and no leg swelling  Blood pressure is reasonably controlled and he is not on any ACE inhibitor and taking BB  HISTORIES:  PROBLEM LIST:   Patient Active Problem List   Diagnosis     GERD (gastroesophageal reflux disease)     S/P PTCA (percutaneous transluminal coronary angioplasty)     Esophageal dysmotility     Chronic renal disease, stage III (H)     Hyperlipidemia with target LDL less than 70     HTN (hypertension)     Impaired renal function     CAD (coronary artery disease)     Abnormal stress test     Hypothyroidism, unspecified type     PAST MEDICAL HISTORY:  Past Medical History:   Diagnosis Date     CAD (coronary artery disease)     3/2012 LILLIAN RCA, 8/2015 Heart cath - previously placed RCA stent patent, 70% mid LAD stenosis, 50% prox CFX, EF 55-60%     CKD (chronic kidney disease) stage 2, GFR 60-89 ml/min     baseline crt 1.20 - 1.60     Esophageal dysmotility      GERD (gastroesophageal reflux disease)      H pylori ulcer      Hip fracture (H) 01/2013    right side - closed fracture - no surgery     HTN (hypertension)      Hyperlipidaemia LDL goal <100      Moderate major  depression (H)      S/P appendectomy      S/P hernia repair     left     S/P PTCA (percutaneous transluminal coronary angioplasty) 03/2012    LILLIAN RCA     PAST SURGICAL HISTORY:  Past Surgical History:   Procedure Laterality Date     APPENDECTOMY       CORONARY ANGIOGRAPHY ADULT ORDER  8/2015    previously placed RCA stent patent, 70% mid LAD stenosis, 50% prox CFX, EF 55-60%     HEART CATH STENT COR W/WO PTCA  3/2012    LILLIAN to RCA      HERNIA REPAIR       CURRENT MEDICATIONS:  Current Outpatient Medications   Medication Sig Dispense Refill     aspirin (ASA) 81 MG EC tablet Take 81 mg by mouth every other day       Cholecalciferol (VITAMIN D3 PO) Take by mouth daily       levothyroxine (SYNTHROID/LEVOTHROID) 75 MCG tablet Take 1 tablet by mouth once daily 90 tablet 0     lisinopril (ZESTRIL) 2.5 MG tablet Take 1 tablet (2.5 mg) by mouth daily 90 tablet 3     metoprolol succinate ER (TOPROL XL) 25 MG 24 hr tablet TAKE 1 TABLET BY MOUTH IN THE MORNING 30 tablet 5     Multiple Vitamins-Minerals (MULTIVITAMIN PO) Take by mouth daily       nitroglycerin (NITROSTAT) 0.4 MG SL tablet Place 1 tablet (0.4 mg) under the tongue every 5 minutes as needed for chest pain 90 tablet 3     omega-3 acid ethyl esters (LOVAZA) 1 g capsule Take 2 capsules (2 g) by mouth 2 times daily 120 capsule 5     RABEprazole (ACIPHEX) 20 MG EC tablet Take 1 tablet by mouth once daily 90 tablet 1     REPATHA SURECLICK 140 MG/ML prefilled autoinjector INJECT THE CONTENTS OF ONE PEN UNDER THE SKIN EVERY OTHER WEEK 6 mL 2     Sharps Container MISC 1 each every 14 days 1 each 0     ALLERGIES:  Allergies   Allergen Reactions     Atorvastatin      Crestor [Rosuvastatin]      Muscle aches     Pravastatin      Simvastatin      Zetia [Ezetimibe]      Myalgias       SOCIAL HISTORY:  Social History     Socioeconomic History     Marital status:      Spouse name: Not on file     Number of children: Not on file     Years of education: Not on file      Highest education level: Not on file   Occupational History     Not on file   Tobacco Use     Smoking status: Former     Packs/day: 1.00     Years: 8.00     Pack years: 8.00     Types: Cigarettes     Quit date: 1970     Years since quittin.5     Smokeless tobacco: Never   Substance and Sexual Activity     Alcohol use: Yes     Alcohol/week: 10.0 standard drinks of alcohol     Types: 10 Standard drinks or equivalent per week     Comment: 1-2 glasses of wine per evening with dinner     Drug use: No     Sexual activity: Yes     Partners: Female   Other Topics Concern      Service Not Asked     Blood Transfusions Not Asked     Caffeine Concern Yes     Comment: 3-4 cups coffee per day     Occupational Exposure Not Asked     Hobby Hazards Not Asked     Sleep Concern Yes     Comment: off and on     Stress Concern Not Asked     Weight Concern Yes     Special Diet No     Back Care Not Asked     Exercise Yes     Comment: walking 5 days week, 2.5 miles, biking occasionally     Bike Helmet Not Asked     Seat Belt Not Asked     Self-Exams Not Asked     Parent/sibling w/ CABG, MI or angioplasty before 65F 55M? Not Asked   Social History Narrative     Not on file     Social Determinants of Health     Financial Resource Strain: Not on file   Food Insecurity: Not on file   Transportation Needs: Not on file   Physical Activity: Not on file   Stress: Not on file   Social Connections: Not on file   Intimate Partner Violence: Not on file   Housing Stability: Not on file     FAMILY HISTORY:  Family History   Problem Relation Age of Onset     Cerebrovascular Disease Mother      Cerebrovascular Disease Father      C.A.D. Father      Myocardial Infarction Father      Parkinsonism Brother      Heart Failure Brother      Other - See Comments Brother          in [place] war     REVIEW OF SYSTEMS:  CONSTITUTIONAL:no malaise, fatigue, or other general symptoms  EYES: no subjective changes in visual acuity, no  photophobia  ENT/MOUTH: no complaints of rhinorrhea, nasal congestion, sore throat, hearing changes  RESP:no SOB  CV: no c/o exertional chest pressure or JAMES  GI: No abdominal pain, constipation, change in bowel movements, nausea, pyrosis, BRBPR  :no polyuria or polydipsia, no dysuria, no gross hematuria  MUSCULOSKELATAL:no arthalgias or myalgias  INTEGUMENTARY/SKIN: no pruritis, rashes, or moles with recent change in size, shape, or pigmentation  NEURO: no gross sensory or motor symptoms, no dizziness, no confusion  ENDOCRINE: no polyuria or polydipsia, no heat or cold intolerance  HEME/ALLERGY/IMMUNE: no fevers, chills, night sweats, or unwanted weight loss  PSYCHIATRIC: no depression, anxiety, or internal stimuli  EXAM:  /88 (BP Location: Right arm, Patient Position: Chair, Cuff Size: Adult Regular)   Pulse 70   Wt 204 lb 12.8 oz (92.9 kg)   SpO2 97%   BMI 28.56 kg/m    BMI: Body mass index is 28.56 kg/m .  GENERAL APPEARANCE:  Pleasant  Healthy appearing male , alert, active, no distress cooperative.  EXAM:  EYES: clear conjunctiva, no cataracts, no obvious fundoscopic abnormalities  HENT: oropharynx, nares, and TMs are WNL  NECK: no JVD, thyromegaly or lymphadenopathy, no cervical bruits  RESP: clear to auscultation without rales, wheezes, or rhonchi  CV: RRR, no murmurs, gallops, or rubs  LYMPH: no cervical , axillary, or inguinal lymphadenopathy appreciated  GI: NABS, ND/NT, no masses or organomegally appreciated  MS: no obvoius clinicallly relevant arthropathy, no evidence vasculitis  SKIN: no nevi clinically suspicious for malignancy are noted  NEURO: CN II-XII intact, no localizing sensory or motor abnoramlities noted, DTRs symmetrical bilaterally  PSYCH: Mental status exam reveals the pt to have normal mood and affect. There is no disorder of thought form or content. There is no response to internal stimuli. There is no suicidal or homicidal ideation.    Reviewed recent laboratory tests and  "recent echocardiogram  ECHO 4/2022  \"Interpretation Summary     Left ventricular systolic function is mildly reduced.  The visual ejection fraction is estimated at 50%.  There is trace to mild aortic regurgitation.  There is no comparison study available.\"  ECHO 3/2023  \"Interpretation Summary     The visual ejection fraction is 50-55%.  Compared to prior study, there is no significant change\"      A/P;  (E78.49) Familial hyperlipidemia ( Heterozygous FH with baseline untreated LDL > 225 on many occasions , TC was 300 range)  (primary encounter diagnosis)  He made significant progress with Repatha and tolerating but LDL is still greater than 70 and also elevated triglycerides  Continue  Lovaza  Take Repatha without missing RX sent  TLC suggested and lose 10 pounds    Plan: omega-3 acid ethyl esters (LOVAZA) 1 g capsule       Will get CMP, NMR and TSH with FT4 index     (I25.10) Coronary artery disease involving native coronary artery of native heart without angina pectoris, 2 v disease hx of RCA stent and modertae LAD lesion)  (I51.9) LV dysfunction EF 50% ( Echo 4/2022)   (Z82.49) Family history of premature CAD, father age 52 MI   Comment: He is asymptomatic  Not on any ACE inhibitor or ARB?  Taking  metoprolol XL 25 mg daily and monitor blood pressure and pulse rate       Stage III CKD  Comment: Unclear why he has microalbuminuria and there is never or small blood in the urine contributed we will repeat the urine for microalbumin optimize the medications add ACE inhibitor  Follow the diet avoid NSAIDs etc.  (I10) Benign essential hypertension  Comment: Well-controlled with current medications , cont same        40 minutes spent on the date of the encounter doing chart review, review of recent labs, previous evaluation, history and exam, documentation and further activities as noted above    This note was dictated by utilizing Dragon software  Copy of this note to primary care physician Dr. Doll and primary " cardiologist Dr.Manoles Micaela Greenwood MD, FAHA, FSVM, FNLA, FACP  Vascular Medicine  Clinical Hypertension specialist  Clinical Lipidologist

## 2023-06-27 NOTE — PATIENT INSTRUCTIONS
Continue current medications     Refilled meds    Go for fasting labs order placed     Follow up virtually or office in 6 months

## 2023-06-27 NOTE — PROGRESS NOTES
[facility]        Patient is here for a  follow up.     Pt is currently taking Aspirin.    /88 (BP Location: Right arm, Patient Position: Chair, Cuff Size: Adult Regular)   Pulse 70   Wt 204 lb 12.8 oz (92.9 kg)   SpO2 97%   BMI 28.56 kg/m      The provider has been notified that the patient has no concerns.     Questions patient would like addressed today are: N/A.    Refills are needed: N/A    Has homecare services and agency name:  Monserrat Chong MA

## 2023-08-08 ENCOUNTER — TELEPHONE (OUTPATIENT)
Dept: FAMILY MEDICINE | Facility: CLINIC | Age: 78
End: 2023-08-08
Payer: COMMERCIAL

## 2023-08-08 NOTE — TELEPHONE ENCOUNTER
"Patient calling clinic to update PCP and seek recommendations. Patient stated his Urologist advised him to discontinue all BP medications to aid with better kidney profusion. Patient stated he stopped BP meds \"about 3 weeks ago\" and BP has been consistently in the \"110/80 to 117/78 range\", patient did not endorse new or worsening symptoms.     Patient also seeking recommendations on receiving shingles vaccine. Per chart review, patient had one shingles vaccine but did not have a second one completed. Patient is seeking recommendations if an additional vaccine is needed.     Routing to PCP for review. Okay to continue w/o BP meds? Is patient due for an additional shingles vaccine? Thanks.   "

## 2023-08-10 NOTE — TELEPHONE ENCOUNTER
Called pt, agreed to VV    Appointments in Next Year      Aug 18, 2023  3:00 PM  (Arrive by 2:40 PM)  Provider Visit with Josef Doll MD  [facility] ([facility] ) [phone]     Sep 08, 2023 11:15 AM  LAB with [facility]  [facility] ([facility] ) [phone]     Sep 15, 2023 12:00 PM  (Arrive by 11:45 AM)  Return Patient with Abdon Hayden MD  [facility] [place] ([facility] ) [phone]          Padilla Aquino RN  [facility]

## 2023-08-18 ENCOUNTER — VIRTUAL VISIT (OUTPATIENT)
Dept: FAMILY MEDICINE | Facility: CLINIC | Age: 78
End: 2023-08-18
Payer: COMMERCIAL

## 2023-08-18 DIAGNOSIS — R80.9 MICROALBUMINURIA: ICD-10-CM

## 2023-08-18 DIAGNOSIS — I10 BENIGN ESSENTIAL HYPERTENSION: ICD-10-CM

## 2023-08-18 DIAGNOSIS — I51.9 LV DYSFUNCTION: ICD-10-CM

## 2023-08-18 PROCEDURE — 99214 OFFICE O/P EST MOD 30 MIN: CPT | Mod: 93 | Performed by: INTERNAL MEDICINE

## 2023-08-18 NOTE — PROGRESS NOTES
Michael is a 78 year old who is being evaluated via a billable telephone visit.      What phone number would you like to be contacted at? [phone]   How would you like to obtain your AVS? Joseph    Distant Location (provider location):  On-site        Subjective   Michael is a 78 year old, presenting for the following health issues:  Medication Follow-up      HPI        Benign essential hypertension  Microalbuminuria  LV dysfunction    Michael Seals has been monitoring his blood pressure over the summer and notes that he has discontinued lisinopril at the direction of nephrology and had continued on metoprolol due to episodic hypotension, but ultimately stopped metoprolol this summer 3 weeks ago given persistent low blood pressure readings..   Most days blood pressure has been in the 100-110's/80's.  No longer taking any blood pressure medications.  Has been staying hydrated.  He feels well, and no longer noticing any light-headedness.        Review of Systems   Constitutional, HEENT, cardiovascular, pulmonary, gi and gu systems are negative, except as otherwise noted.      Objective    Vitals - Patient Reported  Systolic (Patient Reported): 115  Diastolic (Patient Reported): 83  Pain Score: No Pain (0)        Physical Exam   healthy, alert, and no distress  PSYCH: Alert and oriented times 3; coherent speech, normal   rate and volume, able to articulate logical thoughts, able   to abstract reason, no tangential thoughts, no hallucinations   or delusions  His affect is normal  RESP: No cough, no audible wheezing, able to talk in full sentences  Remainder of exam unable to be completed due to telephone visits    (I10) Benign essential hypertension  Comment: We reviewed his blood pressure readings which have been excellent.  We will continue off of both blood pressure medications at this time.  I advised him to follow-up in February for a physical.  Plan:     (R80.9) Microalbuminuria  Comment: Recent nephrology consult.   He was recommended to discontinue lisinopril, and I encouraged him to remain off of this medication  Plan:     (I51.9) LV dysfunction EF 50% ( Echo 4/2022)   Comment: As above.  Okay to discontinue both metoprolol lisinopril given hypotension  Plan:            Phone call duration: 11 minutes

## 2023-09-08 ENCOUNTER — LAB (OUTPATIENT)
Dept: LAB | Facility: CLINIC | Age: 78
End: 2023-09-08
Payer: COMMERCIAL

## 2023-09-08 DIAGNOSIS — E78.49 FAMILIAL HYPERLIPIDEMIA: ICD-10-CM

## 2023-09-08 DIAGNOSIS — E03.9 HYPOTHYROIDISM, UNSPECIFIED TYPE: ICD-10-CM

## 2023-09-08 DIAGNOSIS — D47.2 MGUS (MONOCLONAL GAMMOPATHY OF UNKNOWN SIGNIFICANCE): ICD-10-CM

## 2023-09-08 LAB
ALBUMIN SERPL BCG-MCNC: 4 G/DL (ref 3.5–5.2)
ALP SERPL-CCNC: 94 U/L (ref 40–129)
ALT SERPL W P-5'-P-CCNC: 33 U/L (ref 0–70)
ANION GAP SERPL CALCULATED.3IONS-SCNC: 11 MMOL/L (ref 7–15)
AST SERPL W P-5'-P-CCNC: 34 U/L (ref 0–45)
BASOPHILS # BLD AUTO: 0 10E3/UL (ref 0–0.2)
BASOPHILS NFR BLD AUTO: 0 %
BILIRUB SERPL-MCNC: 0.4 MG/DL
BUN SERPL-MCNC: 17.4 MG/DL (ref 8–23)
CALCIUM SERPL-MCNC: 9.4 MG/DL (ref 8.8–10.2)
CHLORIDE SERPL-SCNC: 105 MMOL/L (ref 98–107)
CREAT SERPL-MCNC: 1.24 MG/DL (ref 0.67–1.17)
DEPRECATED HCO3 PLAS-SCNC: 24 MMOL/L (ref 22–29)
EGFRCR SERPLBLD CKD-EPI 2021: 59 ML/MIN/1.73M2
EOSINOPHIL # BLD AUTO: 0.1 10E3/UL (ref 0–0.7)
EOSINOPHIL NFR BLD AUTO: 1 %
ERYTHROCYTE [DISTWIDTH] IN BLOOD BY AUTOMATED COUNT: 12.5 % (ref 10–15)
GLUCOSE SERPL-MCNC: 108 MG/DL (ref 70–99)
HCT VFR BLD AUTO: 41.1 % (ref 40–53)
HGB BLD-MCNC: 14.3 G/DL (ref 13.3–17.7)
IMM GRANULOCYTES # BLD: 0 10E3/UL
IMM GRANULOCYTES NFR BLD: 0 %
LYMPHOCYTES # BLD AUTO: 2 10E3/UL (ref 0.8–5.3)
LYMPHOCYTES NFR BLD AUTO: 40 %
MCH RBC QN AUTO: 34.2 PG (ref 26.5–33)
MCHC RBC AUTO-ENTMCNC: 34.8 G/DL (ref 31.5–36.5)
MCV RBC AUTO: 98 FL (ref 78–100)
MONOCYTES # BLD AUTO: 0.4 10E3/UL (ref 0–1.3)
MONOCYTES NFR BLD AUTO: 7 %
NEUTROPHILS # BLD AUTO: 2.6 10E3/UL (ref 1.6–8.3)
NEUTROPHILS NFR BLD AUTO: 51 %
PLATELET # BLD AUTO: 206 10E3/UL (ref 150–450)
POTASSIUM SERPL-SCNC: 4.7 MMOL/L (ref 3.4–5.3)
PROT SERPL-MCNC: 8 G/DL (ref 6.4–8.3)
RBC # BLD AUTO: 4.18 10E6/UL (ref 4.4–5.9)
SODIUM SERPL-SCNC: 140 MMOL/L (ref 136–145)
TOTAL PROTEIN SERUM FOR ELP: 7.6 G/DL (ref 6.4–8.3)
TSH SERPL DL<=0.005 MIU/L-ACNC: 3.92 UIU/ML (ref 0.3–4.2)
WBC # BLD AUTO: 5 10E3/UL (ref 4–11)

## 2023-09-08 PROCEDURE — 83521 IG LIGHT CHAINS FREE EACH: CPT | Performed by: INTERNAL MEDICINE

## 2023-09-08 PROCEDURE — 80053 COMPREHEN METABOLIC PANEL: CPT | Performed by: INTERNAL MEDICINE

## 2023-09-08 PROCEDURE — 80061 LIPID PANEL: CPT | Mod: 90 | Performed by: INTERNAL MEDICINE

## 2023-09-08 PROCEDURE — 84155 ASSAY OF PROTEIN SERUM: CPT | Mod: 59 | Performed by: INTERNAL MEDICINE

## 2023-09-08 PROCEDURE — 84165 PROTEIN E-PHORESIS SERUM: CPT

## 2023-09-08 PROCEDURE — 84443 ASSAY THYROID STIM HORMONE: CPT | Performed by: INTERNAL MEDICINE

## 2023-09-08 PROCEDURE — 83704 LIPOPROTEIN BLD QUAN PART: CPT | Mod: 90 | Performed by: INTERNAL MEDICINE

## 2023-09-08 PROCEDURE — 82784 ASSAY IGA/IGD/IGG/IGM EACH: CPT | Performed by: INTERNAL MEDICINE

## 2023-09-08 PROCEDURE — 99000 SPECIMEN HANDLING OFFICE-LAB: CPT | Performed by: INTERNAL MEDICINE

## 2023-09-08 PROCEDURE — 36415 COLL VENOUS BLD VENIPUNCTURE: CPT | Performed by: INTERNAL MEDICINE

## 2023-09-08 PROCEDURE — 85025 COMPLETE CBC W/AUTO DIFF WBC: CPT | Performed by: INTERNAL MEDICINE

## 2023-09-11 LAB
ALBUMIN SERPL ELPH-MCNC: 4 G/DL (ref 3.7–5.1)
ALPHA1 GLOB SERPL ELPH-MCNC: 0.3 G/DL (ref 0.2–0.4)
ALPHA2 GLOB SERPL ELPH-MCNC: 0.7 G/DL (ref 0.5–0.9)
B-GLOBULIN SERPL ELPH-MCNC: 0.7 G/DL (ref 0.6–1)
GAMMA GLOB SERPL ELPH-MCNC: 1.9 G/DL (ref 0.7–1.6)
IGA SERPL-MCNC: 96 MG/DL (ref 84–499)
IGG SERPL-MCNC: 2061 MG/DL (ref 610–1616)
IGM SERPL-MCNC: 169 MG/DL (ref 35–242)
KAPPA LC FREE SER-MCNC: 13.44 MG/DL (ref 0.33–1.94)
KAPPA LC FREE/LAMBDA FREE SER NEPH: 7.55 {RATIO} (ref 0.26–1.65)
LAMBDA LC FREE SERPL-MCNC: 1.78 MG/DL (ref 0.57–2.63)
M PROTEIN SERPL ELPH-MCNC: 1.3 G/DL
PROT PATTERN SERPL ELPH-IMP: ABNORMAL

## 2023-09-14 LAB
CHOLEST SERPL-MCNC: 228 MG/DL
HDL SERPL QN: 8.6 NM
HDL SERPL-SCNC: 35.8 UMOL/L
HDLC SERPL-MCNC: 59 MG/DL
HLD.LARGE SERPL-SCNC: 3.4 UMOL/L
LDL SERPL QN: 20.8 NM
LDL SERPL-SCNC: 1580 NMOL/L
LDL SMALL SERPL-SCNC: 677 NMOL/L
LDLC SERPL CALC-MCNC: 129 MG/DL
PATHOLOGY STUDY: ABNORMAL
TRIGL SERPL-MCNC: 198 MG/DL
VLDL LARGE SERPL-SCNC: 5.6 NMOL/L
VLDL SERPL QN: 48.1 NM

## 2023-09-15 ENCOUNTER — ONCOLOGY VISIT (OUTPATIENT)
Dept: TRANSPLANT | Facility: CLINIC | Age: 78
End: 2023-09-15
Attending: STUDENT IN AN ORGANIZED HEALTH CARE EDUCATION/TRAINING PROGRAM
Payer: COMMERCIAL

## 2023-09-15 VITALS
TEMPERATURE: 98.7 F | HEART RATE: 81 BPM | SYSTOLIC BLOOD PRESSURE: 125 MMHG | WEIGHT: 211.3 LBS | OXYGEN SATURATION: 98 % | DIASTOLIC BLOOD PRESSURE: 76 MMHG | BODY MASS INDEX: 29.47 KG/M2 | RESPIRATION RATE: 16 BRPM

## 2023-09-15 DIAGNOSIS — D47.2 MGUS (MONOCLONAL GAMMOPATHY OF UNKNOWN SIGNIFICANCE): Primary | ICD-10-CM

## 2023-09-15 PROCEDURE — 99214 OFFICE O/P EST MOD 30 MIN: CPT | Performed by: STUDENT IN AN ORGANIZED HEALTH CARE EDUCATION/TRAINING PROGRAM

## 2023-09-15 PROCEDURE — G0463 HOSPITAL OUTPT CLINIC VISIT: HCPCS | Performed by: STUDENT IN AN ORGANIZED HEALTH CARE EDUCATION/TRAINING PROGRAM

## 2023-09-15 ASSESSMENT — PAIN SCALES - GENERAL: PAINLEVEL: NO PAIN (0)

## 2023-09-15 NOTE — NURSING NOTE
"Oncology Rooming Note    September 15, 2023 11:47 AM   Michael Seals is a 78 year old male who presents for:    Chief Complaint   Patient presents with    Oncology Clinic Visit     Multiple myeloma     Initial Vitals: /76 (BP Location: Right arm, Patient Position: Sitting, Cuff Size: Adult Regular)   Pulse 81   Temp 98.7  F (37.1  C) (Oral)   Resp 16   Wt 95.8 kg (211 lb 4.8 oz)   SpO2 98%   BMI 29.47 kg/m   Estimated body mass index is 29.47 kg/m  as calculated from the following:    Height as of 2/6/23: 1.803 m (5' 11\").    Weight as of this encounter: 95.8 kg (211 lb 4.8 oz). Body surface area is 2.19 meters squared.  No Pain (0) Comment: Data Unavailable   No LMP for male patient.  Allergies reviewed: Yes  Medications reviewed: Yes    Medications: Medication refills not needed today.  Pharmacy name entered into [facility]:    [id number] - [place] - [address]  [id number] [place] [place] [facility] [address] [facility] - [place], [place] - [address]    Clinical concerns:       Martinez Haley              "

## 2023-09-15 NOTE — LETTER
9/15/2023         RE: Michael Seals  [address]  [facility] [address]        Dear Colleague,    Thank you for referring your patient, Michael Seals, to the [facility] [place]. Please see a copy of my visit note below.         Hematology/Oncology Consultation    Michael Seals is a 78 year old male referred by Dr. Josef Doll for monoclonal gammopathy.    HPI: Michael Seals is a 77 year old male with history of CARD s/p RCA stent 2015 and CKD 3a who was seen by his primary care physician and in the course of evaluation for CKD was noted to have an IgG kappa monoclonal gammopathy.  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    Mr. Seals is seen today for scheduled follow up.  He denies bone pain, night sweats, fevers/chills, unintentional weight loss, or other acute concerns.  He remains physically active and reports he is generally doing well.        ASSESSMENT AND PLAN:  Monoclonal peak, free light chains, and immunoglobulins are all stable.  He has no evidence of lab or clinical progression from MGUS.  Prior PET/CT had no overtly concerning findings but had an equivocal foci of uptake at L2; this did not meet any diagnostic criteria for myeloma but I will plan to recheck his labs again in 6 months to ensure they remain stable.  If labs remain stable he can be followed annually.    There is no role for chemotherapy with MGUS and there are no treatments recommended to prevent progression.  If Mr. Seals were to develop multiple myeloma at a later date he may be a reasonable candidate for palliative chemotherapy (VRd) despite his age given his good performance status.    We reviewed MGUS again today.  Mr. Seals had a number of appropriate questions that were answered.    Plan: Follow up in 6 months    I spent 30 minutes in the care of this patient today, which included time necessary for preparation for the visit, obtaining  history, ordering medications/tests/procedures as medically indicated, review of pertinent medical literature, counseling of the patient, communication of recommendations to the care team, and documentation time.      ROS:    10 point ROS neg other than the symptoms noted above in the HPI.        Past Medical History:   Diagnosis Date    CAD (coronary artery disease)     3/2012 LILLIAN RCA, 2015 Heart cath - previously placed RCA stent patent, 70% mid LAD stenosis, 50% prox CFX, EF 55-60%    CKD (chronic kidney disease) stage 2, GFR 60-89 ml/min     baseline crt 1.20 - 1.60    Esophageal dysmotility     GERD (gastroesophageal reflux disease)     H pylori ulcer     Hip fracture (H) 2013    right side - closed fracture - no surgery    HTN (hypertension)     Hyperlipidaemia LDL goal <100     Moderate major depression (H)     S/P appendectomy     S/P hernia repair     left    S/P PTCA (percutaneous transluminal coronary angioplasty) 2012    LILLIAN RCA       Past Surgical History:   Procedure Laterality Date    APPENDECTOMY      CORONARY ANGIOGRAPHY ADULT ORDER  2015    previously placed RCA stent patent, 70% mid LAD stenosis, 50% prox CFX, EF 55-60%    HEART CATH STENT COR W/WO PTCA  3/2012    LILLIAN to RCA     HERNIA REPAIR         Family History   Problem Relation Age of Onset    Cerebrovascular Disease Mother     Cerebrovascular Disease Father     C.A.D. Father     Myocardial Infarction Father     Parkinsonism Brother     Heart Failure Brother     Other - See Comments Brother          in [place] war       Social History     Tobacco Use    Smoking status: Former     Packs/day: 1.00     Years: 8.00     Pack years: 8.00     Types: Cigarettes     Quit date: 1970     Years since quittin.7    Smokeless tobacco: Never   Substance Use Topics    Alcohol use: Yes     Alcohol/week: 10.0 standard drinks of alcohol     Types: 10 Standard drinks or equivalent per week     Comment: 1-2 glasses of wine per evening with  dinner    Drug use: No         Allergies   Allergen Reactions    Atorvastatin     Crestor [Rosuvastatin]      Muscle aches    Pravastatin     Simvastatin     Zetia [Ezetimibe]      Myalgias          Current Outpatient Medications   Medication Sig Dispense Refill    aspirin (ASA) 81 MG EC tablet Take 81 mg by mouth every other day      Cholecalciferol (VITAMIN D3 PO) Take by mouth daily      evolocumab (REPATHA SURECLICK) 140 MG/ML prefilled autoinjector Inject 1 mL (140 mg) Subcutaneous every 14 days 6 mL 3    levothyroxine (SYNTHROID/LEVOTHROID) 75 MCG tablet Take 1 tablet (75 mcg) by mouth daily 90 tablet 3    Multiple Vitamins-Minerals (MULTIVITAMIN PO) Take by mouth daily      nitroglycerin (NITROSTAT) 0.4 MG SL tablet Place 1 tablet (0.4 mg) under the tongue every 5 minutes as needed for chest pain 90 tablet 3    omega-3 acid ethyl esters (LOVAZA) 1 g capsule Take 2 capsules (2 g) by mouth 2 times daily 120 capsule 11    RABEprazole (ACIPHEX) 20 MG EC tablet Take 1 tablet by mouth once daily 90 tablet 1    Sharps Container MISC 1 each every 14 days 1 each 0         Physical Exam:     Vital Signs: /76 (BP Location: Right arm, Patient Position: Sitting, Cuff Size: Adult Regular)   Pulse 81   Temp 98.7  F (37.1  C) (Oral)   Resp 16   Wt 95.8 kg (211 lb 4.8 oz)   SpO2 98%   BMI 29.47 kg/m      ECO  General Appearance: alert, and no distress  Eyes: PERRL, conjunctiva and lids normal, sclera nonicteric  Ears/Nose/M/Throat: Oral mucosa and posterior oropharynx normal, moist mucous membranes  Neck supple, non-tender, free range of motion, no adenopathy  Cardio/Vascular:regular rate and rhythm, normal S1 and S2, no murmur  Resp Effort And Auscultation: Normal - Clear to auscultation without rales, rhonchi, or wheezing.  GI: soft, nontender, bowel sounds present in all four quadrants, no hepatosplenomegaly  Lymphatics:no significant enlargement of lymph nodes globally   Musculoskeletal: Musculoskeletal  normal  Edema: none  Skin: Skin color, texture, turgor normal. No rashes or lesions.  Neurologic: Gait normal.  Sensation grossly WNL.  Psych/Affect: Mood and affect are appropriate.    Blood Counts       Recent Labs   Lab Test 09/08/23 1123 03/06/23  0941 11/29/22  1032 12/31/20  0920   HGB 14.3 14.1 14.1 13.7   HCT 41.1 41.5  --  40.9   WBC 5.0 5.0  --  5.4   ANEUTAUTO 2.6 2.7  --   --    ALYMPAUTO 2.0 1.8  --   --    AMONOAUTO 0.4 0.3  --   --    AEOSAUTO 0.1 0.1  --   --    ABSBASO 0.0 0.0  --   --    NRBCMAN  --  0.0  --   --     213  --  221         Chemistries     Basic Panel  Recent Labs   Lab Test 09/08/23 1123 03/06/23  0941 02/06/23  1556    137 140   POTASSIUM 4.7 4.5 4.5   CHLORIDE 105 103 106   CO2 24 26 23   BUN 17.4 18.5 27.0*   CR 1.24* 1.33* 1.31*   * 120* 102*        Calcium, Magnesium, Phosphorus  Recent Labs   Lab Test 09/08/23 1123 03/06/23  0941 02/06/23  1556   JIMMY 9.4 9.5 9.0        LFTs  Recent Labs   Lab Test 09/08/23 1123 03/06/23  0941 11/29/22  1032   BILITOTAL 0.4 0.4 0.4   ALKPHOS 94 91 84   AST 34 26 28   ALT 33 27 30   ALBUMIN 4.0 4.2 3.7       LDH  Recent Labs   Lab Test 03/06/23  0941          B2-Microglobulin  Recent Labs   Lab Test 03/06/23  0941   [id number] 3.6*         Immunoglobulins     Recent Labs   Lab Test 09/08/23 1123 03/06/23  0941   IGG 2,061* 2,177*       Recent Labs   Lab Test 09/08/23 1123 03/06/23  0941   IGA 96 96       Recent Labs   Lab Test 09/08/23 1123 03/06/23  0941    161         Monocloncal Protein Studies     M spike    Recent Labs   Lab Test 09/08/23  1123 03/06/23  0941   ELPM 1.3* 1.2*       [place] FLC    Recent Labs   Lab Test 09/08/23  1123 03/06/23  0941   KFLCA 13.44* 13.76*       Lambda FLC    Recent Labs   Lab Test 09/08/23  1123 03/06/23  0941   LFLCA 1.78 1.94       FLC Ratio    Recent Labs   Lab Test 09/08/23  1123 03/06/23  0941   KLRA 7.55* 7.09*         Bone Marrow Biopsy     Morphology    Results for  orders placed or performed in visit on 03/06/23 (from the past 8760 hour(s))   Bone marrow biopsy   Result Value    Final Diagnosis      Bone marrow, posterior iliac crest, left decalcified trephine biopsy and touch imprint; left particle crush, direct aspirate smear, and concentrated aspirate smear; and peripheral smear:  - Variable marrow cellularity, overall 20-30%, with trilineage hematopoiesis  - Kappa monotypic plasma cells present, comprising 8-10% of cellularity, compatible with plasma cell neoplasm (see comment)  - Peripheral blood showing slight normochromic, normocytic anemia      Comment      Concurrent flow cytometry was performed ([id number]) and showed kappa monotypic plasma cells.    IHC slides reviewed in consultation with Dr. Ortez.  Clonal plasma cells are increased, but they are not greater than 10% of cellularity, overall.  Thus, unless this patient has active disease (CRAB+ symptoms), the differential diagnosis includes MGUS.      Clinical Information      From Epic electronic medical record; 77-year-old male with abnormal labs concerning for multiple myeloma.      Peripheral Hematologic Data      CBC WITH DIFFERENTIAL(03/06/2023 09:49 AM CST):     RESULT VALUE REF. RANGE UNITS   WBC Count  Hemoglobin  Hematocrit  Platelet Count   RBC Count   MCV   MCH   MCHC  RDW 5.0 (NORMAL)    14.1 (NORMAL)     41.5 (NORMAL)  213 (NORMAL)   4.22  ( L )       98 (NORMAL)      33.4  ( H )     34.0 (NORMAL)     13.0 (NORMAL) 4.0-11.0  13.3-17.7  40.0-53.0  150-450  4.40-5.90    26.5-33.0  31.5-36.5  10.0-15.0 10e3/uL  g/dL  %  10e3/uL  10e6/uL  fL  pg  g/dL  %   % Neutrophils  % Lymphocytes  % Monocytes  % Eosinophils  % Basophils  % Immature Granulocytes  Absolute Neutrophils  Absolute Lymphocytes  Absolute Monocytes  Absolute Eosinophils  Absolute Basophils  Absolute Immature Granulocytes  NRBCs per 100 WBC  Absolute NRBCs  56  37  6  1  0  0  2.7 (NORMAL)  1.8 (NORMAL)  0.3 (NORMAL)  0.1 (NORMAL)  0.0 (NORMAL)  0.0 (NORMAL)  0 (NORMAL)  0.0 () N/A  N/A  N/A  N/A  N/A  N/A  1.6-8.3  0.8-5.3  0.0-1.3  0.0-0.7  0.0-0.2  <=0.4  <1  <=0.0 %  %  %  %  %  %  10e3/uL  10e3/uL  10e3/uL  10e3/uL  10e3/uL  10e3/uL  /100  10e3/uL       Microscopic Description      PERIPHERAL BLOOD SMEAR MORPHOLOGY:  The red blood cells appear normochromic.  Poikilocytosis is minimal.  Polychromasia is not increased.  Rouleaux formation is not increased. The morphology of the platelets is normal.     Bone marrow aspirates and trephine core biopsy touch imprints are reviewed.    BONE MARROW DIFFERENTIAL (500 cells on direct aspirate smears)  Percent (%) Cell Population Reference Range (%)   0.4 Blasts  (0 - 1)   2.2 Neutrophil promyelocytes (2 - 4)   55.8 Neutrophils and precursors (54 - 63)   19.4 Erythroid precursors (18 - 24)   2.0 Monocytes (1 - 1.5)   1.6 Eosinophils (1 - 3))   0.0 Basophils (0 - 1))   16.6 Lymphocytes (8 - 12)   2.0 Plasma cells (0 - 1.5)   The aspirate smears are adequate for evaluation.    Neutrophil maturation is complete. Erythroid maturation is complete. Megakaryocytes are present.    IRON STAINS:   Dacie iron stain on concentrate smears:   Iron's show 22% sideroblasts.  No definitve ringed sideroblasts seen on scanning.    Prussian blue stain on particle crush:    Marrow iron stores are present, possibly decreased.    TREPHINE SECTIONS:  Hematoxylin and eosin stains are reviewed.  The quality of the trephine core biopsy is good. The marrow cellularity is estimated at 20-30%. The cellular composition reflects the aspirate smear differential. The number of megakaryocytes appears consistent with the degree of marrow cellularity.     IMMUNOHISTOCHEMISTRY:  Immunohistochemical stains are performed on the paraffin-embedded trephine core with appropriate controls.    Stains for cytoplasmic kappa and lambda immunoglobulin light chains show  kappa-monotypic plasma cells, mostly as clusters, with a few polytypic plasma cells in the background.  Based on a  stain, plasma cells comprise 8-10% of cellularity.    Note: These immunohistochemical stains are deemed medically necessary. Some of the antigens may also be evaluated by flow cytometry. Concurrent evaluation by immunohistochemistry on clot and/or trephine sections is indicated in this case in order to correlate immunophenotype with cell morphology and determine extent of involvement, spatial pattern, and focality of potential disease distribution.       Gross Description      Procedure/Gross Description   Aspirate(s) and trephine(s) procured by JUDI Rashid    Specimen sent for Special Studies:         Flow Cytometry: left aspirate        Cytogenetics: left aspirate        Biopsy aspiration site: left posterior iliac crest                                                      (Reference Range)          Amount of aspirate           2.4   mL        Fat and P.V. cell layer        2    %               (1 - 3)        Particles                             trace   %        Myeloid-erythroid layer    2    %               (5 - 8)          Clot Section: no    Trephine biopsy site: left posterior iliac crest    Designated left posterior iliac crest is 1 cylinder of gritty tissue, labeled with the patient's name and hospital number, obtained with 11 gauge needle and a length of 23 mm; entirely submitted in 1 cassette; acetic zinc formalin fixed, decalcified, processed, and stained for hematoxylin and eosin per laboratory protocol.        MCRS Yes (A)    Performing Labs      The technical component of this testing was completed at [facility]       Michael understood the above assessment and recommendations.  Multiple questions answered.  No barriers to learning identified.    Known issues that I take into account for medical decisions, with  salient changes to the plan considering these complexities noted above.    Patient Active Problem List   Diagnosis    GERD (gastroesophageal reflux disease)    S/P PTCA (percutaneous transluminal coronary angioplasty)    Esophageal dysmotility    Chronic renal disease, stage III (H)    Hyperlipidemia with target LDL less than 70    HTN (hypertension)    Impaired renal function    CAD (coronary artery disease)    Abnormal stress test    Hypothyroidism, unspecified type       ------------------------------------------------------------------------------------------------------------------------------------------------    Patient Care Team         Relationship Specialty Notifications Start End    Josef Doll MD PCP - General Internal Medicine  2/15/12     Phone: [phone] Pager: [phone] Fax: [phone] [address] [place] [address]    Josef Doll MD Assigned PCP   6/7/13     Phone: [phone] Pager: [phone] Fax: [phone] [address] [place] [address]    Micaela Greenwood MD Assigned Heart and Vascular Provider   10/23/20     Phone: [phone] Fax: [phone] [address] [place] [address]    Del Betancourt MD MD Cardiovascular Disease  2/8/23     Phone: [phone] Fax: [phone] [address] [place] [address]    Abdon Hayden MD MD Hematology & Oncology  2/24/23     Phone: [phone] Fax: [phone]         [address] [place] [address] [address] [facility] [address]            Abdon Hayden MD

## 2023-09-15 NOTE — PROGRESS NOTES
Hematology/Oncology Consultation    Michael Seals is a 78 year old male referred by Dr. Josef Doll for monoclonal gammopathy.    HPI: Michael Seals is a 77 year old male with history of CARD s/p RCA stent 2015 and CKD 3a who was seen by his primary care physician and in the course of evaluation for CKD was noted to have an IgG kappa monoclonal gammopathy.  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    Mr. Seals is seen today for scheduled follow up.  He denies bone pain, night sweats, fevers/chills, unintentional weight loss, or other acute concerns.  He remains physically active and reports he is generally doing well.        ASSESSMENT AND PLAN:  Monoclonal peak, free light chains, and immunoglobulins are all stable.  He has no evidence of lab or clinical progression from MGUS.  Prior PET/CT had no overtly concerning findings but had an equivocal foci of uptake at L2; this did not meet any diagnostic criteria for myeloma but I will plan to recheck his labs again in 6 months to ensure they remain stable.  If labs remain stable he can be followed annually.    There is no role for chemotherapy with MGUS and there are no treatments recommended to prevent progression.  If Mr. Seals were to develop multiple myeloma at a later date he may be a reasonable candidate for palliative chemotherapy (VRd) despite his age given his good performance status.    We reviewed MGUS again today.  Mr. Seals had a number of appropriate questions that were answered.    Plan: Follow up in 6 months    I spent 30 minutes in the care of this patient today, which included time necessary for preparation for the visit, obtaining history, ordering medications/tests/procedures as medically indicated, review of pertinent medical literature, counseling of the patient, communication of recommendations to the care team, and documentation time.      ROS:    10 point ROS neg other than the symptoms noted  above in the HPI.        Past Medical History:   Diagnosis Date    CAD (coronary artery disease)     3/2012 LILLIAN RCA, 2015 Heart cath - previously placed RCA stent patent, 70% mid LAD stenosis, 50% prox CFX, EF 55-60%    CKD (chronic kidney disease) stage 2, GFR 60-89 ml/min     baseline crt 1.20 - 1.60    Esophageal dysmotility     GERD (gastroesophageal reflux disease)     H pylori ulcer     Hip fracture (H) 2013    right side - closed fracture - no surgery    HTN (hypertension)     Hyperlipidaemia LDL goal <100     Moderate major depression (H)     S/P appendectomy     S/P hernia repair     left    S/P PTCA (percutaneous transluminal coronary angioplasty) 2012    LILLIAN RCA       Past Surgical History:   Procedure Laterality Date    APPENDECTOMY      CORONARY ANGIOGRAPHY ADULT ORDER  2015    previously placed RCA stent patent, 70% mid LAD stenosis, 50% prox CFX, EF 55-60%    HEART CATH STENT COR W/WO PTCA  3/2012    LILLIAN to RCA     HERNIA REPAIR         Family History   Problem Relation Age of Onset    Cerebrovascular Disease Mother     Cerebrovascular Disease Father     C.A.D. Father     Myocardial Infarction Father     Parkinsonism Brother     Heart Failure Brother     Other - See Comments Brother          in [place] war       Social History     Tobacco Use    Smoking status: Former     Packs/day: 1.00     Years: 8.00     Pack years: 8.00     Types: Cigarettes     Quit date: 1970     Years since quittin.7    Smokeless tobacco: Never   Substance Use Topics    Alcohol use: Yes     Alcohol/week: 10.0 standard drinks of alcohol     Types: 10 Standard drinks or equivalent per week     Comment: 1-2 glasses of wine per evening with dinner    Drug use: No         Allergies   Allergen Reactions    Atorvastatin     Crestor [Rosuvastatin]      Muscle aches    Pravastatin     Simvastatin     Zetia [Ezetimibe]      Myalgias          Current Outpatient Medications   Medication Sig Dispense Refill    aspirin  (ASA) 81 MG EC tablet Take 81 mg by mouth every other day      Cholecalciferol (VITAMIN D3 PO) Take by mouth daily      evolocumab (REPATHA SURECLICK) 140 MG/ML prefilled autoinjector Inject 1 mL (140 mg) Subcutaneous every 14 days 6 mL 3    levothyroxine (SYNTHROID/LEVOTHROID) 75 MCG tablet Take 1 tablet (75 mcg) by mouth daily 90 tablet 3    Multiple Vitamins-Minerals (MULTIVITAMIN PO) Take by mouth daily      nitroglycerin (NITROSTAT) 0.4 MG SL tablet Place 1 tablet (0.4 mg) under the tongue every 5 minutes as needed for chest pain 90 tablet 3    omega-3 acid ethyl esters (LOVAZA) 1 g capsule Take 2 capsules (2 g) by mouth 2 times daily 120 capsule 11    RABEprazole (ACIPHEX) 20 MG EC tablet Take 1 tablet by mouth once daily 90 tablet 1    Sharps Container MISC 1 each every 14 days 1 each 0         Physical Exam:     Vital Signs: /76 (BP Location: Right arm, Patient Position: Sitting, Cuff Size: Adult Regular)   Pulse 81   Temp 98.7  F (37.1  C) (Oral)   Resp 16   Wt 95.8 kg (211 lb 4.8 oz)   SpO2 98%   BMI 29.47 kg/m      ECO  General Appearance: alert, and no distress  Eyes: PERRL, conjunctiva and lids normal, sclera nonicteric  Ears/Nose/M/Throat: Oral mucosa and posterior oropharynx normal, moist mucous membranes  Neck supple, non-tender, free range of motion, no adenopathy  Cardio/Vascular:regular rate and rhythm, normal S1 and S2, no murmur  Resp Effort And Auscultation: Normal - Clear to auscultation without rales, rhonchi, or wheezing.  GI: soft, nontender, bowel sounds present in all four quadrants, no hepatosplenomegaly  Lymphatics:no significant enlargement of lymph nodes globally   Musculoskeletal: Musculoskeletal normal  Edema: none  Skin: Skin color, texture, turgor normal. No rashes or lesions.  Neurologic: Gait normal.  Sensation grossly WNL.  Psych/Affect: Mood and affect are appropriate.    Blood Counts       Recent Labs   Lab Test 23  1123 23  0941 22  1032  12/31/20  0920   HGB 14.3 14.1 14.1 13.7   HCT 41.1 41.5  --  40.9   WBC 5.0 5.0  --  5.4   ANEUTAUTO 2.6 2.7  --   --    ALYMPAUTO 2.0 1.8  --   --    AMONOAUTO 0.4 0.3  --   --    AEOSAUTO 0.1 0.1  --   --    ABSBASO 0.0 0.0  --   --    NRBCMAN  --  0.0  --   --     213  --  221         Chemistries     Basic Panel  Recent Labs   Lab Test 09/08/23 1123 03/06/23  0941 02/06/23  1556    137 140   POTASSIUM 4.7 4.5 4.5   CHLORIDE 105 103 106   CO2 24 26 23   BUN 17.4 18.5 27.0*   CR 1.24* 1.33* 1.31*   * 120* 102*        Calcium, Magnesium, Phosphorus  Recent Labs   Lab Test 09/08/23 1123 03/06/23  0941 02/06/23  1556   JIMMY 9.4 9.5 9.0        LFTs  Recent Labs   Lab Test 09/08/23 1123 03/06/23  0941 11/29/22  1032   BILITOTAL 0.4 0.4 0.4   ALKPHOS 94 91 84   AST 34 26 28   ALT 33 27 30   ALBUMIN 4.0 4.2 3.7       LDH  Recent Labs   Lab Test 03/06/23  0941          B2-Microglobulin  Recent Labs   Lab Test 03/06/23  0941   [id number] 3.6*         Immunoglobulins     Recent Labs   Lab Test 09/08/23 1123 03/06/23  0941   IGG 2,061* 2,177*       Recent Labs   Lab Test 09/08/23 1123 03/06/23  0941   IGA 96 96       Recent Labs   Lab Test 09/08/23 1123 03/06/23  0941    161         Monocloncal Protein Studies     M spike    Recent Labs   Lab Test 09/08/23 1123 03/06/23  0941   ELPM 1.3* 1.2*       Burtrum FLC    Recent Labs   Lab Test 09/08/23 1123 03/06/23  0941   KFLCA 13.44* 13.76*       Lambda FLC    Recent Labs   Lab Test 09/08/23 1123 03/06/23  0941   LFLCA 1.78 1.94       FLC Ratio    Recent Labs   Lab Test 09/08/23  1123 03/06/23  0941   KLRA 7.55* 7.09*         Bone Marrow Biopsy     Morphology    Results for orders placed or performed in visit on 03/06/23 (from the past 8760 hour(s))   Bone marrow biopsy   Result Value    Final Diagnosis      Bone marrow, posterior iliac crest, left decalcified trephine biopsy and touch imprint; left particle crush, direct aspirate smear, and  concentrated aspirate smear; and peripheral smear:  - Variable marrow cellularity, overall 20-30%, with trilineage hematopoiesis  - Kappa monotypic plasma cells present, comprising 8-10% of cellularity, compatible with plasma cell neoplasm (see comment)  - Peripheral blood showing slight normochromic, normocytic anemia      Comment      Concurrent flow cytometry was performed ([id number]) and showed kappa monotypic plasma cells.    IHC slides reviewed in consultation with Dr. Ortez.  Clonal plasma cells are increased, but they are not greater than 10% of cellularity, overall.  Thus, unless this patient has active disease (CRAB+ symptoms), the differential diagnosis includes MGUS.      Clinical Information      From Epic electronic medical record; 77-year-old male with abnormal labs concerning for multiple myeloma.      Peripheral Hematologic Data      CBC WITH DIFFERENTIAL(03/06/2023 09:49 AM CST):     RESULT VALUE REF. RANGE UNITS   WBC Count  Hemoglobin  Hematocrit  Platelet Count   RBC Count   MCV   MCH   MCHC  RDW 5.0 (NORMAL)    14.1 (NORMAL)     41.5 (NORMAL)  213 (NORMAL)   4.22  ( L )       98 (NORMAL)      33.4  ( H )     34.0 (NORMAL)     13.0 (NORMAL) 4.0-11.0  13.3-17.7  40.0-53.0  150-450  4.40-5.90    26.5-33.0  31.5-36.5  10.0-15.0 10e3/uL  g/dL  %  10e3/uL  10e6/uL  fL  pg  g/dL  %   % Neutrophils  % Lymphocytes  % Monocytes  % Eosinophils  % Basophils  % Immature Granulocytes  Absolute Neutrophils  Absolute Lymphocytes  Absolute Monocytes  Absolute Eosinophils  Absolute Basophils  Absolute Immature Granulocytes  NRBCs per 100 WBC  Absolute NRBCs 56  37  6  1  0  0  2.7 (NORMAL)  1.8 (NORMAL)  0.3 (NORMAL)  0.1 (NORMAL)  0.0 (NORMAL)  0.0 (NORMAL)  0 (NORMAL)  0.0 () N/A  N/A  N/A  N/A  N/A  N/A  1.6-8.3  0.8-5.3  0.0-1.3  0.0-0.7  0.0-0.2  <=0.4  <1  <=0.0 %  %  %  %  %  %  10e3/uL  10e3/uL  10e3/uL  10e3/uL  10e3/uL  10e3/uL  /100  10e3/uL       Microscopic Description      PERIPHERAL BLOOD  SMEAR MORPHOLOGY:  The red blood cells appear normochromic.  Poikilocytosis is minimal.  Polychromasia is not increased.  Rouleaux formation is not increased. The morphology of the platelets is normal.     Bone marrow aspirates and trephine core biopsy touch imprints are reviewed.    BONE MARROW DIFFERENTIAL (500 cells on direct aspirate smears)  Percent (%) Cell Population Reference Range (%)   0.4 Blasts  (0 - 1)   2.2 Neutrophil promyelocytes (2 - 4)   55.8 Neutrophils and precursors (54 - 63)   19.4 Erythroid precursors (18 - 24)   2.0 Monocytes (1 - 1.5)   1.6 Eosinophils (1 - 3))   0.0 Basophils (0 - 1))   16.6 Lymphocytes (8 - 12)   2.0 Plasma cells (0 - 1.5)   The aspirate smears are adequate for evaluation.    Neutrophil maturation is complete. Erythroid maturation is complete. Megakaryocytes are present.    IRON STAINS:   Dacie iron stain on concentrate smears:   Iron's show 22% sideroblasts.  No definitve ringed sideroblasts seen on scanning.    Prussian blue stain on particle crush:    Marrow iron stores are present, possibly decreased.    TREPHINE SECTIONS:  Hematoxylin and eosin stains are reviewed.  The quality of the trephine core biopsy is good. The marrow cellularity is estimated at 20-30%. The cellular composition reflects the aspirate smear differential. The number of megakaryocytes appears consistent with the degree of marrow cellularity.     IMMUNOHISTOCHEMISTRY:  Immunohistochemical stains are performed on the paraffin-embedded trephine core with appropriate controls.    Stains for cytoplasmic kappa and lambda immunoglobulin light chains show kappa-monotypic plasma cells, mostly as clusters, with a few polytypic plasma cells in the background.  Based on a  stain, plasma cells comprise 8-10% of cellularity.    Note: These immunohistochemical stains are deemed medically necessary. Some of the antigens may also be evaluated by flow cytometry. Concurrent evaluation by immunohistochemistry on  clot and/or trephine sections is indicated in this case in order to correlate immunophenotype with cell morphology and determine extent of involvement, spatial pattern, and focality of potential disease distribution.       Gross Description      Procedure/Gross Description   Aspirate(s) and trephine(s) procured by JUD[facility] Rashid    Specimen sent for Special Studies:         Flow Cytometry: left aspirate        Cytogenetics: left aspirate        Biopsy aspiration site: left posterior iliac crest                                                      (Reference Range)          Amount of aspirate           2.4   mL        Fat and P.V. cell layer        2    %               (1 - 3)        Particles                             trace   %        Myeloid-erythroid layer    2    %               (5 - 8)          Clot Section: no    Trephine biopsy site: left posterior iliac crest    Designated left posterior iliac crest is 1 cylinder of gritty tissue, labeled with the patient's name and hospital number, obtained with 11 gauge needle and a length of 23 mm; entirely submitted in 1 cassette; acetic zinc formalin fixed, decalcified, processed, and stained for hematoxylin and eosin per laboratory protocol.        MCRS Yes (A)    Performing Labs      The technical component of this testing was completed at [facility]       Michael understood the above assessment and recommendations.  Multiple questions answered.  No barriers to learning identified.    Known issues that I take into account for medical decisions, with salient changes to the plan considering these complexities noted above.    Patient Active Problem List   Diagnosis    GERD (gastroesophageal reflux disease)    S/P PTCA (percutaneous transluminal coronary angioplasty)    Esophageal dysmotility    Chronic renal disease, stage III (H)    Hyperlipidemia with target LDL less than 70    HTN (hypertension)     Impaired renal function    CAD (coronary artery disease)    Abnormal stress test    Hypothyroidism, unspecified type       ------------------------------------------------------------------------------------------------------------------------------------------------    Patient Care Team         Relationship Specialty Notifications Start End    Josef Doll MD PCP - General Internal Medicine  2/15/12     Phone: [phone] Pager: [phone] Fax: [phone] [address] [place] [address]    Josef Doll MD Assigned PCP   6/7/13     Phone: [phone] Pager: [phone] Fax: [phone] [address] [place] [address]    Micaela Greenwood MD Assigned Heart and Vascular Provider   10/23/20     Phone: [phone] Fax: [phone] [address] [place]                [place] [address]    Del Betancourt MD MD Cardiovascular Disease  2/8/23     Phone: [phone] Fax: [phone] [address] [place] [address]    Abdon Hayden MD MD Hematology & Oncology  2/24/23     Phone: [phone] Fax: [phone]         [address] [place] [id number]

## 2023-09-20 ENCOUNTER — TELEPHONE (OUTPATIENT)
Dept: OTHER | Facility: CLINIC | Age: 78
End: 2023-09-20
Payer: COMMERCIAL

## 2023-09-20 NOTE — TELEPHONE ENCOUNTER
----- Message from Hair Payan MD sent at 9/19/2023  4:42 PM CDT -----  Please schedule an appointment with Dr. Greenwood to discuss management of these results.       Please arrange for phone visit to discuss results of labs drawn 9/8/2023      Appt note:   Follow-up to labs drawn 9/8/23.

## 2023-09-20 NOTE — TELEPHONE ENCOUNTER
Future Appointments   Date Time Provider Department Center   10/5/2023  4:20 PM Micaela Greenwood MD [facility]

## 2023-10-12 ENCOUNTER — VIRTUAL VISIT (OUTPATIENT)
Dept: OTHER | Facility: CLINIC | Age: 78
End: 2023-10-12
Attending: INTERNAL MEDICINE
Payer: COMMERCIAL

## 2023-10-12 DIAGNOSIS — I10 BENIGN ESSENTIAL HYPERTENSION: ICD-10-CM

## 2023-10-12 DIAGNOSIS — Z82.49 FAMILY HISTORY OF PREMATURE CAD: ICD-10-CM

## 2023-10-12 DIAGNOSIS — N18.31 STAGE 3A CHRONIC KIDNEY DISEASE (H): ICD-10-CM

## 2023-10-12 DIAGNOSIS — E78.49 FAMILIAL HYPERLIPIDEMIA: Primary | ICD-10-CM

## 2023-10-12 DIAGNOSIS — I25.10 CORONARY ARTERY DISEASE INVOLVING NATIVE CORONARY ARTERY OF NATIVE HEART WITHOUT ANGINA PECTORIS: ICD-10-CM

## 2023-10-12 DIAGNOSIS — E03.9 HYPOTHYROIDISM, UNSPECIFIED TYPE: ICD-10-CM

## 2023-10-12 PROCEDURE — 99214 OFFICE O/P EST MOD 30 MIN: CPT | Mod: 95 | Performed by: INTERNAL MEDICINE

## 2023-10-12 NOTE — PATIENT INSTRUCTIONS
Please go for fasting lipids, CMP, TSH  in 2 months ,  order placed continue current medications , follow diet     Continue repatha and fish oil caps same and do not miss medications     Virtual visit week after labs

## 2023-10-12 NOTE — PROGRESS NOTES
Michael is a 78 year old who is being evaluated via a billable telephone visit.      What phone number would you like to be contacted at? [phone]  How would you like to obtain your AVS? Joseph    Distant Location (provider location):  On-site      Objective         Vitals:  No vitals were obtained today due to virtual visit.    PER LAL      Provider visit note:    Follow-up visit  Multiple concerns  Review of recent labs  He was not fasting on the day of the recent lab test   Taking Repatha and fish oil capsules  Statin and Zetia intolerance   LDL is not at goal and triglycerides are still elevated  LDLp >1500  sLDLP>650     History of present illness:  For full details please see my previous office visit notes  Michael Seals is a 78 year old very pleasant male with known history of coronary artery disease status post stenting, familial hyperlipidemia heterozygous FH intolerance to multiple statins currently taking Repatha, hypertension underwent recently labs which showed urine for microalbumin and his blood sugar in the past was 110 range but no history of diabetes, echocardiogram few months ago showed EF around 50% and is asymptomatic.  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 with baseline untreated LDL > 225 on many occasions , TC was 300 range)  (primary encounter diagnosis)  He made significant progress with Repatha and tolerating but LDL is still greater than 70 and also elevated triglycerides  Continue  Lovaza  Recent NMR LipoProfile was done nonfasting LDL  particle number not at goal and triglycerides elevated  We will repeat NMR LipoProfile, thyroid function tests and CMP labs in 2 months then followed by virtual visit  Take Repatha without missing RX sent  TLC suggested and lose 10 pounds         Will get CMP, NMR and TSH with FT4 index      (I25.10) Coronary artery disease involving native coronary artery of native heart without angina pectoris, 2 v disease hx of RCA stent and modertae LAD lesion)  (I51.9) LV dysfunction EF 50% ( Echo 4/2022)   (Z82.49) Family history of premature CAD, father age 52 MI   Comment: He is asymptomatic  Not on any ACE inhibitor or ARB?  Consider in the future  Taking  metoprolol XL 25 mg daily   Continue to monitor blood pressure and pulse rate       Stage III CKD  Risk factor modification tight control of blood pressure  Follow the diet avoid NSAIDs etc.    (I10) Benign essential hypertension  Comment: Well-controlled with current medications , cont same         Hypothyroidism: On replacement clinically euthyroid recent thyroid function tests are normal and the same      Phone visit start time: 4:10 PM  Phone visit end time: 4:28 PM  Location of the patient: at his home in [place]  Location of the provider: [facility]    30 minutes total patient care time spent on the date of the encounter doing chart review, review of recent lab tests previous evaluation, history, documentation and addressed above-mentioned multiple issues    AVS with written instructions done      This note was dictated by utilizing Dragon software  Copy of this note to primary care physician Dr. Doll and primary cardiologist       This visit is being conducted as a virtual visit due to the emphasis on mitigation of the COVID-19 virus pandemic. The clinician has decided that the risk of an in-office visit outweighs the benefit for this patient.         Micaela Greenwood MD, FADAX, FSVM, FNLA, FACP  Vascular Medicine  Clinical  Hypertension specialist  Clinical Lipidologist

## 2023-11-02 DIAGNOSIS — K21.9 GASTROESOPHAGEAL REFLUX DISEASE WITHOUT ESOPHAGITIS: ICD-10-CM

## 2023-11-02 RX ORDER — RABEPRAZOLE SODIUM 20 MG/1
TABLET, DELAYED RELEASE ORAL
Qty: 90 TABLET | Refills: 2 | Status: SHIPPED | OUTPATIENT
Start: 2023-11-02 | End: 2024-02-19

## 2023-11-15 DIAGNOSIS — E78.49 FAMILIAL HYPERLIPIDEMIA: ICD-10-CM

## 2023-11-15 RX ORDER — EVOLOCUMAB 140 MG/ML
INJECTION, SOLUTION SUBCUTANEOUS
Qty: 6 ML | Refills: 2 | Status: SHIPPED | OUTPATIENT
Start: 2023-11-15 | End: 2024-07-24

## 2023-11-15 NOTE — TELEPHONE ENCOUNTER
evolocumab (REPATHA SURECLICK) 140 MG/ML prefilled autoinjector     Last Written Prescription Date:  6/27/23  Last Fill Quantity: 6ml,  # refills: 3     Last visit with provider:  10/12/23  Follow up recommended:  December 2023    Pharmacy transfer.

## 2023-11-27 ENCOUNTER — TELEPHONE (OUTPATIENT)
Dept: FAMILY MEDICINE | Facility: CLINIC | Age: 78
End: 2023-11-27
Payer: COMMERCIAL

## 2023-11-27 NOTE — TELEPHONE ENCOUNTER
Pt called the clinic stating he was seen in UC last week and diagnosed with bronchitis.   He was given prednisone and an inhaler. He was also given doxycycline (100mg BID for 5 days) to start if other medications were ineffective.     Pt states his symptoms have slightly improved but are still present, and he is wondering if he should start the antibiotics he was given, or if there is anything further his PCP recommends.     His symptoms include a cough, clear/white mucus, and rib pain from coughing. He denies a fever or SOB.     Routing to PCP to advise.   Can we leave a detailed message on this number? YES  Phone number patient can be reached at: Home number on file [phone] (home)    Gabi Chun RN  [facility] Triage

## 2023-11-27 NOTE — TELEPHONE ENCOUNTER
No contraindication of doxycycline.  Would be OK for trial if not improved with steroids.    Josef Doll MD, MD

## 2023-11-27 NOTE — TELEPHONE ENCOUNTER
Patient is going to try the doxycycline. Agreeable to Dr. Doll's advice.     JOE Saldana  [facility]

## 2024-01-17 ENCOUNTER — LAB (OUTPATIENT)
Dept: LAB | Facility: CLINIC | Age: 79
End: 2024-01-17
Payer: COMMERCIAL

## 2024-01-17 DIAGNOSIS — E03.9 HYPOTHYROIDISM, UNSPECIFIED TYPE: ICD-10-CM

## 2024-01-17 DIAGNOSIS — I10 BENIGN ESSENTIAL HYPERTENSION: ICD-10-CM

## 2024-01-17 DIAGNOSIS — E78.49 FAMILIAL HYPERLIPIDEMIA: ICD-10-CM

## 2024-01-17 PROCEDURE — 80053 COMPREHEN METABOLIC PANEL: CPT

## 2024-01-17 PROCEDURE — 80061 LIPID PANEL: CPT | Mod: 90

## 2024-01-17 PROCEDURE — 36415 COLL VENOUS BLD VENIPUNCTURE: CPT

## 2024-01-17 PROCEDURE — 84443 ASSAY THYROID STIM HORMONE: CPT

## 2024-01-17 PROCEDURE — 83704 LIPOPROTEIN BLD QUAN PART: CPT | Mod: 90

## 2024-01-17 PROCEDURE — 99000 SPECIMEN HANDLING OFFICE-LAB: CPT

## 2024-01-18 LAB
ALBUMIN SERPL BCG-MCNC: 3.8 G/DL (ref 3.5–5.2)
ALP SERPL-CCNC: 89 U/L (ref 40–150)
ALT SERPL W P-5'-P-CCNC: 33 U/L (ref 0–70)
ANION GAP SERPL CALCULATED.3IONS-SCNC: 8 MMOL/L (ref 7–15)
AST SERPL W P-5'-P-CCNC: 27 U/L (ref 0–45)
BILIRUB SERPL-MCNC: 0.4 MG/DL
BUN SERPL-MCNC: 21.7 MG/DL (ref 8–23)
CALCIUM SERPL-MCNC: 9 MG/DL (ref 8.8–10.2)
CHLORIDE SERPL-SCNC: 106 MMOL/L (ref 98–107)
CREAT SERPL-MCNC: 1.35 MG/DL (ref 0.67–1.17)
DEPRECATED HCO3 PLAS-SCNC: 25 MMOL/L (ref 22–29)
EGFRCR SERPLBLD CKD-EPI 2021: 53 ML/MIN/1.73M2
GLUCOSE SERPL-MCNC: 109 MG/DL (ref 70–99)
POTASSIUM SERPL-SCNC: 5 MMOL/L (ref 3.4–5.3)
PROT SERPL-MCNC: 7.4 G/DL (ref 6.4–8.3)
SODIUM SERPL-SCNC: 139 MMOL/L (ref 135–145)
TSH SERPL DL<=0.005 MIU/L-ACNC: 3.46 UIU/ML (ref 0.3–4.2)

## 2024-01-20 LAB
CHOLEST SERPL-MCNC: 210 MG/DL
HDL SERPL QN: 8.5 NM
HDL SERPL-SCNC: 34.5 UMOL/L
HDLC SERPL-MCNC: 52 MG/DL
HLD.LARGE SERPL-SCNC: 3.1 UMOL/L
LDL SERPL QN: 20.7 NM
LDL SERPL-SCNC: 1320 NMOL/L
LDL SMALL SERPL-SCNC: 503 NMOL/L
LDLC SERPL CALC-MCNC: 108 MG/DL
PATHOLOGY STUDY: ABNORMAL
TRIGL SERPL-MCNC: 252 MG/DL
VLDL LARGE SERPL-SCNC: 6.8 NMOL/L
VLDL SERPL QN: 49.3 NM

## 2024-01-24 ENCOUNTER — VIRTUAL VISIT (OUTPATIENT)
Dept: OTHER | Facility: CLINIC | Age: 79
End: 2024-01-24
Attending: INTERNAL MEDICINE
Payer: COMMERCIAL

## 2024-01-24 DIAGNOSIS — I10 BENIGN ESSENTIAL HYPERTENSION: ICD-10-CM

## 2024-01-24 DIAGNOSIS — Z82.49 FAMILY HISTORY OF PREMATURE CAD: ICD-10-CM

## 2024-01-24 DIAGNOSIS — I25.10 CORONARY ARTERY DISEASE INVOLVING NATIVE CORONARY ARTERY OF NATIVE HEART WITHOUT ANGINA PECTORIS: ICD-10-CM

## 2024-01-24 DIAGNOSIS — N18.31 STAGE 3A CHRONIC KIDNEY DISEASE (H): ICD-10-CM

## 2024-01-24 DIAGNOSIS — E03.9 HYPOTHYROIDISM, UNSPECIFIED TYPE: ICD-10-CM

## 2024-01-24 DIAGNOSIS — E78.49 FAMILIAL HYPERLIPIDEMIA: ICD-10-CM

## 2024-01-24 PROCEDURE — 99443 PR PHYSICIAN TELEPHONE EVALUATION 21-30 MIN: CPT | Mod: 93 | Performed by: INTERNAL MEDICINE

## 2024-01-24 NOTE — PROGRESS NOTES
Michael is a 78 year old who is being evaluated via a billable telephone visit.      What phone number would you like to be contacted at? [phone]  How would you like to obtain your AVS? Joseph    Distant Location (provider location):  On-site          Objective         Vitals:  No vitals were obtained today due to virtual visit.    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 intolerance to multiple statins currently taking Repatha, hypertension underwent recently labs which showed improved LDL particle number but still elevated triglycerides   he denies any chest pain, shortness of breath or palpitations and no leg swelling  Blood pressure is reasonably controlled and he is not on any ACE inhibitor and taking BB  Creatinine is also slightly elevated and he was fasting more than 12 hours that day    Review of systems: Reviewed all 12 point review of systems as per HPI otherwise unremarkable    Physical exam:( no physical exam done this is virtual visit)    Reviewed recent laboratory tests, imaging studies in the epic and updated chart    Assessment and plan:    E78.49) Familial hyperlipidemia ( Heterozygous FH with baseline untreated LDL > 225 on many occasions , TC was 300 range)  (primary encounter diagnosis)  He made significant progress with Repatha and tolerating but still elevated triglycerides  Continue  Lovaza, increase the dose and take 2 pills a day  Continue Repatha every 2 weeks  TLC  suggested and lose 10 pounds         Will get CMP, NMR and TSH with FT4 index in 6 months then followed by a virtual or office visit     (I25.10) Coronary artery disease involving native coronary artery of native heart without angina pectoris, 2 v disease hx of RCA stent and modertae LAD lesion)  (I51.9) LV dysfunction EF 50% ( Echo 4/2022)   (Z82.49) Family history of premature CAD, father age 52 MI   Comment: He is asymptomatic  Not on any ACE inhibitor or ARB?  Consider in the future  Taking  metoprolol XL 25 mg daily   Continue to monitor blood pressure and pulse rate       Stage III CKD  Risk factor modification tight control of blood pressure  Follow the diet avoid NSAIDs etc.    (I10) Benign essential hypertension  Comment: Well-controlled with current medications , cont same         Hypothyroidism: On replacement clinically euthyroid recent thyroid function tests are normal and the same      Phone visit start time: 1:05 PM   Phone visit end time: 1:24 PM   Location of the patient: at his home in [place]  Location of the provider: [facility]    22 minutes total patient care time spent on the date of the encounter doing chart review, review of recent lab tests previous evaluation, history, documentation and addressed above-mentioned multiple issues    AVS with written instructions done      This note was dictated by utilizing Dragon software  Copy of this note to primary care physician Dr. Doll and primary cardiologist       This visit is being conducted as a virtual visit due to the emphasis on mitigation of the COVID-19 virus pandemic. The clinician has decided that the risk of an in-office visit outweighs the benefit for this patient.         Micaela Greenwood MD, BARBARA, FSVM, FNLA, FACP  Vascular Medicine  Clinical Hypertension specialist  Clinical Lipidologist

## 2024-01-24 NOTE — PATIENT INSTRUCTIONS
Your lipids looks better but still triglycerides elevated    Increase fish oil capsules dose and take 2 pills a day with food  Continue Repatha every 2 weeks    Monitor blood pressure at home    Repeat NMR lab for profile, TSH, comprehensive metabolic panel in 6 months then followed by virtual or office visit

## 2024-02-13 ENCOUNTER — TELEPHONE (OUTPATIENT)
Dept: OTHER | Facility: CLINIC | Age: 79
End: 2024-02-13
Payer: COMMERCIAL

## 2024-02-13 NOTE — TELEPHONE ENCOUNTER
Prior Authorization Specialty Medication Request    Medication/Dose:   evolocumab (REPATHA SURECLICK) 140 MG/ML prefilled autoinjector   INJECT THE CONTENTS OF ONE PEN UNDER THE SKIN EVERY OTHER WEEK     Diagnosis and ICD code (if different than what is on RX):    Familial hyperlipidemia ( Heterozygous FH with baseline untreated LDL > 225 on many occasions , TC was 300 range) [E78.49]     New/renewal/insurance change PA/secondary ins. PA:    Covermymeds Key:  [id number]      Pharmacy Information   Name:  [facility] - [place] [address]    Phone:  [phone]   Fax:[phone]

## 2024-02-14 NOTE — NURSING NOTE
"Chief Complaint   Patient presents with     Wellness Visit        Initial /81 (BP Location: Left arm, Patient Position: Chair, Cuff Size: Adult Large)  Pulse 51  Temp 97.4  F (36.3  C) (Tympanic)  Ht 5' 10.5\" (1.791 m)  Wt 203 lb 14.4 oz (92.5 kg)  SpO2 96%  BMI 28.84 kg/m2 Estimated body mass index is 28.84 kg/(m^2) as calculated from the following:    Height as of this encounter: 5' 10.5\" (1.791 m).    Weight as of this encounter: 203 lb 14.4 oz (92.5 kg)..    BP completed using cuff size: large  MEDICATIONS REVIEWED  SOCIAL AND FAMILY HX REVIEWED  Tonja Us CMA  " - Continue home meds at this time.

## 2024-02-19 ENCOUNTER — OFFICE VISIT (OUTPATIENT)
Dept: FAMILY MEDICINE | Facility: CLINIC | Age: 79
End: 2024-02-19
Payer: COMMERCIAL

## 2024-02-19 VITALS
OXYGEN SATURATION: 96 % | WEIGHT: 208.5 LBS | HEART RATE: 79 BPM | HEIGHT: 71 IN | TEMPERATURE: 97.3 F | BODY MASS INDEX: 29.19 KG/M2 | DIASTOLIC BLOOD PRESSURE: 80 MMHG | SYSTOLIC BLOOD PRESSURE: 124 MMHG | RESPIRATION RATE: 18 BRPM

## 2024-02-19 DIAGNOSIS — I25.10 ASCVD (ARTERIOSCLEROTIC CARDIOVASCULAR DISEASE): ICD-10-CM

## 2024-02-19 DIAGNOSIS — E03.9 HYPOTHYROIDISM, UNSPECIFIED TYPE: ICD-10-CM

## 2024-02-19 DIAGNOSIS — Z23 NEED FOR SHINGLES VACCINE: ICD-10-CM

## 2024-02-19 DIAGNOSIS — Z00.00 ROUTINE GENERAL MEDICAL EXAMINATION AT A HEALTH CARE FACILITY: Primary | ICD-10-CM

## 2024-02-19 DIAGNOSIS — D47.2 MGUS (MONOCLONAL GAMMOPATHY OF UNKNOWN SIGNIFICANCE): ICD-10-CM

## 2024-02-19 DIAGNOSIS — K21.9 GASTROESOPHAGEAL REFLUX DISEASE WITHOUT ESOPHAGITIS: ICD-10-CM

## 2024-02-19 DIAGNOSIS — N18.30 STAGE 3 CHRONIC KIDNEY DISEASE, UNSPECIFIED WHETHER STAGE 3A OR 3B CKD (H): ICD-10-CM

## 2024-02-19 DIAGNOSIS — I25.10 CORONARY ARTERY DISEASE INVOLVING NATIVE CORONARY ARTERY OF NATIVE HEART WITHOUT ANGINA PECTORIS: ICD-10-CM

## 2024-02-19 PROCEDURE — G0439 PPPS, SUBSEQ VISIT: HCPCS | Performed by: INTERNAL MEDICINE

## 2024-02-19 PROCEDURE — 99213 OFFICE O/P EST LOW 20 MIN: CPT | Mod: 25 | Performed by: INTERNAL MEDICINE

## 2024-02-19 RX ORDER — NITROGLYCERIN 0.4 MG/1
0.4 TABLET SUBLINGUAL EVERY 5 MIN PRN
Qty: 30 TABLET | Refills: 3 | Status: SHIPPED | OUTPATIENT
Start: 2024-02-19

## 2024-02-19 RX ORDER — RABEPRAZOLE SODIUM 20 MG/1
1 TABLET, DELAYED RELEASE ORAL DAILY
Qty: 90 TABLET | Refills: 3 | Status: SHIPPED | OUTPATIENT
Start: 2024-02-19

## 2024-02-19 RX ORDER — LEVOTHYROXINE SODIUM 75 UG/1
75 TABLET ORAL DAILY
Qty: 90 TABLET | Refills: 3 | Status: SHIPPED | OUTPATIENT
Start: 2024-02-19 | End: 2024-07-31

## 2024-02-19 RX ORDER — RESPIRATORY SYNCYTIAL VIRUS VACCINE 120MCG/0.5
0.5 KIT INTRAMUSCULAR ONCE
Qty: 1 EACH | Refills: 0 | Status: CANCELLED | OUTPATIENT
Start: 2024-02-19 | End: 2024-02-19

## 2024-02-19 SDOH — HEALTH STABILITY: PHYSICAL HEALTH: ON AVERAGE, HOW MANY DAYS PER WEEK DO YOU ENGAGE IN MODERATE TO STRENUOUS EXERCISE (LIKE A BRISK WALK)?: 5 DAYS

## 2024-02-19 ASSESSMENT — PAIN SCALES - GENERAL: PAINLEVEL: NO PAIN (0)

## 2024-02-19 ASSESSMENT — SOCIAL DETERMINANTS OF HEALTH (SDOH): HOW OFTEN DO YOU GET TOGETHER WITH FRIENDS OR RELATIVES?: TWICE A WEEK

## 2024-02-19 NOTE — PATIENT INSTRUCTIONS
(Z00.00) Routine general medical examination at a health care facility  (primary encounter diagnosis)  Comment: For routine exam, we reviewed labs as ordered, cholesterol, diabetes mellitus check, liver function, renal function We will also update vaccination history.   Plan:     (Z23) Need for shingles vaccine  Comment: Shingrix vaccine is now available.  I would call your insurance to see if a shingles vaccine is covered and get this at your pharmacy   Also, RSV vaccine is recommended   Plan:     (I25.10) Coronary artery disease involving native coronary artery of native heart without angina pectoris  Comment: Continue Repatha, aspirin 81 every other day and noted no blood pressure medications   Plan:     (N18.30) Stage 3 chronic kidney disease, unspecified whether stage 3a or 3b CKD (H)  Comment: Continue to stay hydrated as best you can and avoid nephrotoxic medications (NSAIDs)  Plan:     (E03.9) Hypothyroidism, unspecified type  Comment: Check TSH recently - OK to continue levothyroxine   Plan: levothyroxine (SYNTHROID/LEVOTHROID) 75 MCG         tablet            (K21.9) Gastroesophageal reflux disease without esophagitis  Comment: Also, doing very well with aciphex  Plan: RABEprazole (ACIPHEX) 20 MG EC tablet            (I25.10) ASCVD (arteriosclerotic cardiovascular disease)  Comment: OK to have nitroglycerin available as needed   Plan: nitroGLYcerin (NITROSTAT) 0.4 MG sublingual         tablet        \    (D47.2) MGUS (monoclonal gammopathy of unknown significance)  Comment: Check complete blood counts and labs with oncology - no concerns today   Plan:

## 2024-02-19 NOTE — PROGRESS NOTES
Preventive Care Visit  [facility]  Josef Doll MD, MD, Internal Medicine  Feb 19, 2024      Robel Rodriguez is a 78 year old, presenting for the following:  Physical (Patient is here for annual wellness visit.)        2/19/2024    10:17 AM   Additional Questions   Roomed by Mike SERNA MA   Accompanied by SELF         Health Care Directive  Patient does not have a Health Care Directive or Living Will: Discussed advance care planning with patient; however, patient declined at this time.    HPI        2/19/2024   General Health   How would you rate your overall physical health? Good   Feel stress (tense, anxious, or unable to sleep) Only a little   (!) STRESS CONCERN      2/19/2024   Nutrition   Diet: Regular (no restrictions)    Low salt         2/19/2024   Exercise   Days per week of moderate/strenous exercise 5 days         2/19/2024   Social Factors   Frequency of gathering with friends or relatives Twice a week   Worry food won't last until get money to buy more No   Food not last or not have enough money for food? No   Do you have housing?  Yes   Are you worried about losing your housing? No   Lack of transportation? No   Unable to get utilities (heat,electricity)? No         2/19/2024   Fall Risk   Fallen 2 or more times in the past year? No   Trouble with walking or balance? No          2/19/2024   Activities of Daily Living- Home Safety   Needs help with the following daily activites None of the above   Safety concerns in the home None of the above         2/19/2024   Dental   Dentist two times every year? Yes         2/19/2024   Hearing Screening   Hearing concerns? (!) IT'S HARD TO FOLLOW A CONVERSATION IN A NOISY RESTAURANT OR CROWDED ROOM.    (!) TROUBLE UNDERSTANDING SOFT OR WHISPERED SPEECH.         2/19/2024   Driving Risk Screening   Patient/family members have concerns about driving No         2/19/2024   General Alertness/Fatigue Screening   Have you been more tired  than usual lately? No         2024   Urinary Incontinence Screening   Bothered by leaking urine in past 6 months No          No data to display                  Today's PHQ-2 Score:       2024    10:06 AM   PHQ-2 (  [employer])   Q1: Little interest or pleasure in doing things 0   Q2: Feeling down, depressed or hopeless 0   PHQ-2 Score 0   Q1: Little interest or pleasure in doing things Not at all   Q2: Feeling down, depressed or hopeless Not at all   PHQ-2 Score 0           2024   Substance Use   Alcohol more than 3/day or more than 7/wk No   Do you have a current opioid prescription? No   How severe/bad is pain from 1 to 10? /10   Do you use any other substances recreationally? No     Social History     Tobacco Use    Smoking status: Former     Packs/day: 1.00     Years: 8.00     Additional pack years: 0.00     Total pack years: 8.00     Types: Cigarettes     Quit date: 1970     Years since quittin.1    Smokeless tobacco: Never   Substance Use Topics    Alcohol use: Yes     Alcohol/week: 10.0 standard drinks of alcohol     Types: 10 Standard drinks or equivalent per week     Comment: 1-2 glasses of wine per evening with dinner    Drug use: No               Reviewed and updated as needed this visit by Provider                    Past Medical History:   Diagnosis Date    CAD (coronary artery disease)     3/2012 L[facility]IAN RCA, 2015 Heart cath - previously placed RCA stent patent, 70% mid LAD stenosis, 50% prox CFX, EF 55-60%    CKD (chronic kidney disease) stage 2, GFR 60-89 ml/min     baseline crt 1.20 - 1.60    Esophageal dysmotility     GERD (gastroesophageal reflux disease)     H pylori ulcer     Hip fracture (H) 2013    right side - closed fracture - no surgery    HTN (hypertension)     Hyperlipidaemia LDL goal <100     Moderate major depression (H)     S/P appendectomy     S/P hernia repair     left    S/P PTCA (percutaneous transluminal coronary angioplasty) 2012    LILLIAN RCA     Current  providers sharing in care for this patient include:  Patient Care Team:  Josef Doll MD as PCP - General (Internal Medicine)  Josef Doll MD as Assigned PCP  Micaela Greenwood MD as Assigned Heart and Vascular Provider  Del Betancourt MD as MD (Cardiovascular Disease)  Abdon Hayden MD as MD (Hematology & Oncology)  Mirna Paula, RN as Specialty Care Coordinator (Hematology & Oncology)  Abdon Hayden MD as Assigned Cancer Care Provider    The following health maintenance items are reviewed in Epic and correct as of today:  Health Maintenance   Topic Date Due    RSV VACCINE (Pregnancy & 60+) (1 - 1-dose 60+ series) Never done    ZOSTER IMMUNIZATION (1 of 2) 08/26/2010    MEDICARE ANNUAL WELLNESS VISIT  03/16/2023    ANNUAL REVIEW OF HM ORDERS  07/08/2023    COVID-19 Vaccine (5 - 2023-24 season) 09/01/2023    LIPID  11/29/2023    MICROALBUMIN  02/06/2024    HEMOGLOBIN  09/08/2024    BMP  01/17/2025    TSH W/FREE T4 REFLEX  01/17/2025    FALL RISK ASSESSMENT  02/19/2025    GLUCOSE  01/17/2027    ADVANCE CARE PLANNING  03/16/2027    DTAP/TDAP/TD IMMUNIZATION (3 - Td or Tdap) 03/16/2032    PHQ-2 (once per calendar year)  Completed    INFLUENZA VACCINE  Completed    Pneumococcal Vaccine: 65+ Years  Completed    URINALYSIS  Completed    IPV IMMUNIZATION  Aged Out    HPV IMMUNIZATION  Aged Out    MENINGITIS IMMUNIZATION  Aged Out    RSV MONOCLONAL ANTIBODY  Aged Out    COLORECTAL CANCER SCREENING  Discontinued     Coronary artery disease involving native coronary artery of native heart without angina pectoris   Doing well with current management of coronary artery disease  Stage 3 chronic kidney disease, unspecified whether stage 3a or 3b CKD (H)   Has had follow up in nephrology and no concerns.  Did have finding of MGUS   Hypothyroidism, unspecified type   Has had TSH checked recently and stable.   Gastroesophageal reflux disease without esophagitis   No issues with current  "use of aciphex     Review of Systems  Constitutional, HEENT, cardiovascular, pulmonary, GI - doing well with gastroesophageal reflux symptoms - only mild reflux once every 6 months, , musculoskeletal, neuro, skin, endocrine and psych systems are negative, except as otherwise noted.     Objective    Exam  /80 (BP Location: Left arm, Patient Position: Sitting, Cuff Size: Adult Large)   Pulse 79   Temp 97.3  F (36.3  C) (Temporal)   Resp 18   Ht 1.791 m (5' 10.5\")   Wt 94.6 kg (208 lb 8 oz)   SpO2 96%   BMI 29.49 kg/m     Estimated body mass index is 29.49 kg/m  as calculated from the following:    Height as of this encounter: 1.791 m (5' 10.5\").    Weight as of this encounter: 94.6 kg (208 lb 8 oz).    Physical Exam  GENERAL: alert and no distress  EYES: Eyes grossly normal to inspection,   HENT: ear canals and TM's normal, nose and mouth without ulcers or lesions  NECK: no adenopathy, no asymmetry, masses, or scars  RESP: lungs clear to auscultation - no rales, rhonchi or wheezes  CV: regular rate and rhythm, normal S1 S2, no S3 or S4, no murmur   ABDOMEN: soft, nontender, no hepatosplenomegaly   MS: no gross musculoskeletal defects noted, no edema  SKIN: no suspicious lesions or rashes  NEURO: Normal strength and tone, mentation intact and speech normal  PSYCH: mentation appears normal, affect normal/bright         2/19/2024   Mini Cog   Clock Draw Score 2 Normal   3 Item Recall 3 objects recalled   Mini Cog Total Score 5         Patient Instructions   (Z00.00) Routine general medical examination at a health care facility  (primary encounter diagnosis)  Comment: For routine exam, we reviewed labs as ordered, cholesterol, diabetes mellitus check, liver function, renal function We will also update vaccination history.   Plan:     (Z23) Need for shingles vaccine  Comment: Shingrix vaccine is now available.  I would call your insurance to see if a shingles vaccine is covered and get this at your pharmacy  "  Also, RSV vaccine is recommended   Plan:     (I25.10) Coronary artery disease involving native coronary artery of native heart without angina pectoris  Comment: Continue Repatha, aspirin 81 every other day and noted no blood pressure medications   Plan:     (N18.30) Stage 3 chronic kidney disease, unspecified whether stage 3a or 3b CKD (H)  Comment: Continue to stay hydrated as best you can and avoid nephrotoxic medications (NSAIDs)  Plan:     (E03.9) Hypothyroidism, unspecified type  Comment: Check TSH recently - OK to continue levothyroxine   Plan: levothyroxine (SYNTHROID/LEVOTHROID) 75 MCG         tablet            (K21.9) Gastroesophageal reflux disease without esophagitis  Comment: Also, doing very well with aciphex  Plan: RABEprazole (ACIPHEX) 20 MG EC tablet            (I25.10) ASCVD (arteriosclerotic cardiovascular disease)  Comment: OK to have nitroglycerin available as needed   Plan: nitroGLYcerin (NITROSTAT) 0.4 MG sublingual         tablet        \    (D47.2) MGUS (monoclonal gammopathy of unknown significance)  Comment: Check complete blood counts and labs with oncology - no concerns today   Plan:          Signed Electronically by: Josef Doll MD, MD

## 2024-02-23 NOTE — TELEPHONE ENCOUNTER
Prior Authorization Approval    Authorization Effective Date: 1/1/2024  Authorization Expiration Date: 2/23/2025  Medication: Repatha - RENEWAL  Approved Dose/Quantity:   Reference #:     Insurance Company: [employer] - Phone [phone] Fax [phone]  Expected CoPay:       CoPay Card Available:      Foundation Assistance Needed:    Which Pharmacy is filling the prescription (Not needed for infusion/clinic administered): [facility] - [place], [place] - [address]  Pharmacy Notified:  yes  Patient Notified:  yes- Pharmacy will contact patient when ready to /ship

## 2024-02-23 NOTE — TELEPHONE ENCOUNTER
Central Prior Authorization Team   Phone: [phone]    PA Initiation    Medication: Repatha - RENEWAL  Insurance Company: [employer] - Phone [phone] Fax [phone]  Pharmacy Filling the Rx: [facility] - [place], [place] - [address]  Filling Pharmacy Phone: [phone]  Filling Pharmacy Fax:    Start Date: 2/23/2024

## 2024-03-08 ENCOUNTER — LAB (OUTPATIENT)
Dept: LAB | Facility: CLINIC | Age: 79
End: 2024-03-08
Payer: COMMERCIAL

## 2024-03-08 DIAGNOSIS — D47.2 MGUS (MONOCLONAL GAMMOPATHY OF UNKNOWN SIGNIFICANCE): ICD-10-CM

## 2024-03-08 DIAGNOSIS — N18.30 CHRONIC RENAL DISEASE, STAGE III (H): ICD-10-CM

## 2024-03-08 DIAGNOSIS — I25.10 CAD (CORONARY ARTERY DISEASE): Primary | ICD-10-CM

## 2024-03-08 LAB
BASOPHILS # BLD AUTO: 0 10E3/UL (ref 0–0.2)
BASOPHILS NFR BLD AUTO: 1 %
EOSINOPHIL # BLD AUTO: 0 10E3/UL (ref 0–0.7)
EOSINOPHIL NFR BLD AUTO: 1 %
ERYTHROCYTE [DISTWIDTH] IN BLOOD BY AUTOMATED COUNT: 12.6 % (ref 10–15)
HCT VFR BLD AUTO: 45.1 % (ref 40–53)
HGB BLD-MCNC: 15.3 G/DL (ref 13.3–17.7)
IMM GRANULOCYTES # BLD: 0 10E3/UL
IMM GRANULOCYTES NFR BLD: 0 %
LYMPHOCYTES # BLD AUTO: 2.3 10E3/UL (ref 0.8–5.3)
LYMPHOCYTES NFR BLD AUTO: 52 %
MCH RBC QN AUTO: 33.5 PG (ref 26.5–33)
MCHC RBC AUTO-ENTMCNC: 33.9 G/DL (ref 31.5–36.5)
MCV RBC AUTO: 99 FL (ref 78–100)
MONOCYTES # BLD AUTO: 0.4 10E3/UL (ref 0–1.3)
MONOCYTES NFR BLD AUTO: 10 %
NEUTROPHILS # BLD AUTO: 1.6 10E3/UL (ref 1.6–8.3)
NEUTROPHILS NFR BLD AUTO: 36 %
NRBC # BLD AUTO: 0 10E3/UL
NRBC BLD AUTO-RTO: 0 /100
PLATELET # BLD AUTO: 228 10E3/UL (ref 150–450)
RBC # BLD AUTO: 4.57 10E6/UL (ref 4.4–5.9)
WBC # BLD AUTO: 4.4 10E3/UL (ref 4–11)

## 2024-03-08 PROCEDURE — 84155 ASSAY OF PROTEIN SERUM: CPT

## 2024-03-08 PROCEDURE — 36415 COLL VENOUS BLD VENIPUNCTURE: CPT

## 2024-03-08 PROCEDURE — 80053 COMPREHEN METABOLIC PANEL: CPT

## 2024-03-08 PROCEDURE — 83521 IG LIGHT CHAINS FREE EACH: CPT

## 2024-03-08 PROCEDURE — 85025 COMPLETE CBC W/AUTO DIFF WBC: CPT

## 2024-03-08 PROCEDURE — 82570 ASSAY OF URINE CREATININE: CPT

## 2024-03-08 PROCEDURE — 84165 PROTEIN E-PHORESIS SERUM: CPT | Performed by: PATHOLOGY

## 2024-03-08 PROCEDURE — 82784 ASSAY IGA/IGD/IGG/IGM EACH: CPT

## 2024-03-08 PROCEDURE — 82043 UR ALBUMIN QUANTITATIVE: CPT

## 2024-03-08 PROCEDURE — 80061 LIPID PANEL: CPT

## 2024-03-09 LAB
ALBUMIN SERPL BCG-MCNC: 4.2 G/DL (ref 3.5–5.2)
ALP SERPL-CCNC: 94 U/L (ref 40–150)
ALT SERPL W P-5'-P-CCNC: 42 U/L (ref 0–70)
ANION GAP SERPL CALCULATED.3IONS-SCNC: 8 MMOL/L (ref 7–15)
AST SERPL W P-5'-P-CCNC: 33 U/L (ref 0–45)
BILIRUB SERPL-MCNC: 0.4 MG/DL
BUN SERPL-MCNC: 20.4 MG/DL (ref 8–23)
CALCIUM SERPL-MCNC: 9.5 MG/DL (ref 8.8–10.2)
CHLORIDE SERPL-SCNC: 104 MMOL/L (ref 98–107)
CHOLEST SERPL-MCNC: 232 MG/DL
CREAT SERPL-MCNC: 1.34 MG/DL (ref 0.67–1.17)
CREAT UR-MCNC: 111 MG/DL
DEPRECATED HCO3 PLAS-SCNC: 26 MMOL/L (ref 22–29)
EGFRCR SERPLBLD CKD-EPI 2021: 54 ML/MIN/1.73M2
FASTING STATUS PATIENT QL REPORTED: YES
GLUCOSE SERPL-MCNC: 107 MG/DL (ref 70–99)
HDLC SERPL-MCNC: 66 MG/DL
LDLC SERPL CALC-MCNC: 143 MG/DL
MICROALBUMIN UR-MCNC: 1173 MG/L
MICROALBUMIN/CREAT UR: 1056.76 MG/G CR (ref 0–17)
NONHDLC SERPL-MCNC: 166 MG/DL
POTASSIUM SERPL-SCNC: 4.5 MMOL/L (ref 3.4–5.3)
PROT SERPL-MCNC: 7.5 G/DL (ref 6.4–8.3)
SODIUM SERPL-SCNC: 138 MMOL/L (ref 135–145)
TOTAL PROTEIN SERUM FOR ELP: 7.5 G/DL (ref 6.4–8.3)
TRIGL SERPL-MCNC: 117 MG/DL

## 2024-03-10 ENCOUNTER — TELEPHONE (OUTPATIENT)
Dept: FAMILY MEDICINE | Facility: CLINIC | Age: 79
End: 2024-03-10
Payer: COMMERCIAL

## 2024-03-11 LAB
ALBUMIN SERPL ELPH-MCNC: 4.1 G/DL (ref 3.7–5.1)
ALPHA1 GLOB SERPL ELPH-MCNC: 0.3 G/DL (ref 0.2–0.4)
ALPHA2 GLOB SERPL ELPH-MCNC: 0.7 G/DL (ref 0.5–0.9)
B-GLOBULIN SERPL ELPH-MCNC: 0.8 G/DL (ref 0.6–1)
GAMMA GLOB SERPL ELPH-MCNC: 1.7 G/DL (ref 0.7–1.6)
IGA SERPL-MCNC: 100 MG/DL (ref 84–499)
IGG SERPL-MCNC: 1850 MG/DL (ref 610–1616)
IGM SERPL-MCNC: 151 MG/DL (ref 35–242)
KAPPA LC FREE SER-MCNC: 12.32 MG/DL (ref 0.33–1.94)
KAPPA LC FREE/LAMBDA FREE SER NEPH: 6.38 {RATIO} (ref 0.26–1.65)
LAMBDA LC FREE SERPL-MCNC: 1.93 MG/DL (ref 0.57–2.63)
M PROTEIN SERPL ELPH-MCNC: 1.2 G/DL
PROT PATTERN SERPL ELPH-IMP: ABNORMAL

## 2024-03-11 NOTE — RESULT ENCOUNTER NOTE
Kunal Rodriguez,    I had the opportunity to review your recent labs and a summary of your labs reads as follows:    Your fasting lipid panel show  - normal HDL (good) cholesterol -as your goal is greater than 40  - elevated LDL (bad) cholesterol as your goal is less than 100 - recommend follow up with vascular to discuss options for controling your cholesterol   - normal triglyceride levels  Your urine albumin is escalating and follow up in nephrology is needed      Sincerely,  Josef Doll MD

## 2024-03-11 NOTE — TELEPHONE ENCOUNTER
Can we call Michael Seals and let him know that     Kunal Rodriguez,    I had the opportunity to review your recent labs and a summary of your labs reads as follows:    Your fasting lipid panel show  - normal HDL (good) cholesterol -as your goal is greater than 40  - elevated LDL (bad) cholesterol as your goal is less than 100 - recommend follow up with vascular to discuss options for controling your cholesterol   - normal triglyceride levels  Your urine albumin is escalating and follow up in nephrology is needed      Sincerely,  Josef Doll MD

## 2024-03-12 NOTE — TELEPHONE ENCOUNTER
Triage outreach    Attempt number 1    Left message to call back at [phone].    Shana RN  [facility]

## 2024-03-13 NOTE — TELEPHONE ENCOUNTER
Patient Contact    Attempt # 2    Was call answered?  No.  Left message on voicemail with information to call triage back.    Haydee Alberto RN  [facility]

## 2024-03-14 NOTE — PROGRESS NOTES
Hematology/Oncology Progress Note    Michael Seals is a 78 year old male referred by Dr. Josef Doll for monoclonal gammopathy.    HPI: Michael Seals is a 77 year old male with history of CARD s/p RCA stent 2015 and CKD 3a who was seen by his primary care physician and in the course of evaluation for CKD was noted to have an IgG kappa monoclonal gammopathy.  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    Mr. Seals is seen today for scheduled follow up.  He denies bone pain, night sweats, fevers/chills, unintentional weight loss, or other acute concerns.  He reports he is doing well overall and staying active at home.  No major concerns since hte last time I saw him.        ASSESSMENT AND PLAN:  Mr. Seals's peripheral myeloma labs remain stable and have actually down-trended slightly since our last visit.  Overall his diagnosis remains consistent with MGUS.  He did have an increase in his urine albumin recently and has been referred to nephrology.  I asked Mr. Seals to additionally complete a UPEP and UIFE which are pending.  However, based on his available studies it is unlikely that the increase in urine albuimin would be attributed to myeloma and I think nephrology referral is appropriate.    There is no role for chemotherapy with MGUS and there are no treatments recommended to prevent progression.  If Mr. Seals were to develop multiple myeloma at a later date he may be a reasonable candidate for palliative chemotherapy despite his age given his good performance status.    We reviewed MGUS again today.  Mr. Seals had a number of appropriate questions that were answered.  I will plan to see him back in one year for lab follow up unless there are new concerns on his nephrology workup.    Plan:  - Repeat labs in one year    I spent 30 minutes in the care of this patient today, which included time necessary for preparation for the visit, obtaining history, ordering  medications/tests/procedures as medically indicated, review of pertinent medical literature, counseling of the patient, communication of recommendations to the care team, and documentation time.    Abdon Hayden MD    ROS:    10 point ROS neg other than the symptoms noted above in the HPI.        Current Outpatient Medications   Medication Sig Dispense Refill    aspirin (ASA) 81 MG EC tablet Take 81 mg by mouth every other day      Cholecalciferol (VITAMIN D3 PO) Take by mouth daily      evolocumab (REPATHA SURECLICK) 140 MG/ML prefilled autoinjector INJECT THE CONTENTS OF ONE PEN UNDER THE SKIN EVERY OTHER WEEK 6 mL 2    levothyroxine (SYNTHROID/LEVOTHROID) 75 MCG tablet Take 1 tablet (75 mcg) by mouth daily 90 tablet 3    Multiple Vitamins-Minerals (MULTIVITAMIN PO) Take by mouth daily      omega-3 acid ethyl esters (LOVAZA) 1 g capsule Take 2 capsules (2 g) by mouth 2 times daily 120 capsule 11    RABEprazole (ACIPHEX) 20 MG EC tablet Take 1 tablet (20 mg) by mouth daily 90 tablet 3    Sharps Container MISC 1 each every 14 days 1 each 0    nitroGLYcerin (NITROSTAT) 0.4 MG sublingual tablet Place 1 tablet (0.4 mg) under the tongue every 5 minutes as needed for chest pain (Patient not taking: Reported on 3/15/2024) 30 tablet 3         Physical Exam:     Vital Signs: /83   Pulse 78   Temp 98  F (36.7  C) (Oral)   Resp 16   Wt 96 kg (211 lb 9.6 oz)   SpO2 95%   BMI 29.93 kg/m      ECO  General Appearance: alert, and no distress  Eyes: PERRL, conjunctiva and lids normal, sclera nonicteric  Ears/Nose/M/Throat: Oral mucosa and posterior oropharynx normal, moist mucous membranes  Resp Effort And Auscultation: Breathing comfortably on room air  Musculoskeletal: Musculoskeletal normal  Edema: none  Skin: Skin color, texture, turgor normal. No rashes or lesions.  Neurologic: Gait normal.  Sensation grossly WNL.  Psych/Affect: Mood and affect are appropriate.    Blood Counts       Recent Labs   Lab Test  03/08/24  1107 09/08/23  1123 03/06/23  0941   HGB 15.3 14.3 14.1   HCT 45.1 41.1 41.5   WBC 4.4 5.0 5.0   ANEUTAUTO 1.6 2.6 2.7   ALYMPAUTO 2.3 2.0 1.8   AMONOAUTO 0.4 0.4 0.3   AEOSAUTO 0.0 0.1 0.1   ABSBASO 0.0 0.0 0.0   NRBCMAN 0.0  --  0.0    206 213         Chemistries     Basic Panel  Recent Labs   Lab Test 03/08/24  1107 01/17/24  0952 09/08/23  1123    139 140   POTASSIUM 4.5 5.0 4.7   CHLORIDE 104 106 105   CO2 26 25 24   BUN 20.4 21.7 17.4   CR 1.34* 1.35* 1.24*   * 109* 108*        Calcium, Magnesium, Phosphorus  Recent Labs   Lab Test 03/08/24  1107 01/17/24  0952 09/08/23  1123   JIMMY 9.5 9.0 9.4        LFTs  Recent Labs   Lab Test 03/08/24  1107 01/17/24  0952 09/08/23  1123   BILITOTAL 0.4 0.4 0.4   ALKPHOS 94 89 94   AST 33 27 34   ALT 42 33 33   ALBUMIN 4.2 3.8 4.0       LDH  Recent Labs   Lab Test 03/06/23  0941          B2-Microglobulin  Recent Labs   Lab Test 03/06/23  0941   [id number] 3.6*         Immunoglobulins     Recent Labs   Lab Test 03/08/24  1107 09/08/23  1123 03/06/23  0941   IGG 1,850* 2,061* 2,177*       Recent Labs   Lab Test 03/08/24  1107 09/08/23  1123 03/06/23  0941    96 96       Recent Labs   Lab Test 03/08/24  1107 09/08/23  1123 03/06/23  0941    169 161         Monocloncal Protein Studies     M spike    Recent Labs   Lab Test 03/08/24  1107 09/08/23  1123 03/06/23  0941   ELPM 1.2* 1.3* 1.2*       [employer] FLC    Recent Labs   Lab Test 03/08/24  1107 09/08/23  1123 03/06/23  0941   KFLCA 12.32* 13.44* 13.76*       Lambda FLC    Recent Labs   Lab Test 03/08/24  1107 09/08/23  1123 03/06/23  0941   LFLCA 1.93 1.78 1.94       FLC Ratio    Recent Labs   Lab Test 03/08/24  1107 09/08/23  1123 03/06/23  0941   KLRA 6.38* 7.55* 7.09*         Bone Marrow Biopsy     Morphology    No results found for this or any previous visit (from the past 8760 hour(s)).    The longitudinal plan of care for the diagnosis(es)/condition(s) as documented were  addressed during this visit. Due to the added complexity in care, I will continue to support Michael in the subsequent management and with ongoing continuity of care.      ------------------------------------------------------------------------------------------------------------------------------------------------    Patient Care Team         Relationship Specialty Notifications Start End    Josef Doll MD PCP - General Internal Medicine  2/15/12     Phone: [phone] Pager: [phone] Fax: [phone] [address] [place] [address]    Josef Doll MD Assigned PCP   6/7/13     Phone: [phone] Pager: [phone] Fax: [phone] [address] [place] [address]    Micaela Greenwood MD Assigned Heart and Vascular Provider   10/23/20     Phone: [phone] Fax: [phone] [address] [place] [address]    Del Betancourt MD MD Cardiovascular Disease  2/8/23     Phone: [phone] Fax: [phone] [address] [place] [address]    Abdon Hayden MD MD Hematology & Oncology  2/24/23     Phone: [phone] Fax: [phone] [address] [facility] [address] [facility] [address]

## 2024-03-15 ENCOUNTER — ONCOLOGY VISIT (OUTPATIENT)
Dept: TRANSPLANT | Facility: CLINIC | Age: 79
End: 2024-03-15
Attending: STUDENT IN AN ORGANIZED HEALTH CARE EDUCATION/TRAINING PROGRAM
Payer: COMMERCIAL

## 2024-03-15 VITALS
WEIGHT: 211.6 LBS | SYSTOLIC BLOOD PRESSURE: 128 MMHG | OXYGEN SATURATION: 95 % | DIASTOLIC BLOOD PRESSURE: 83 MMHG | BODY MASS INDEX: 29.93 KG/M2 | TEMPERATURE: 98 F | RESPIRATION RATE: 16 BRPM | HEART RATE: 78 BPM

## 2024-03-15 DIAGNOSIS — D47.2 MGUS (MONOCLONAL GAMMOPATHY OF UNKNOWN SIGNIFICANCE): Primary | ICD-10-CM

## 2024-03-15 PROCEDURE — G2211 COMPLEX E/M VISIT ADD ON: HCPCS | Performed by: STUDENT IN AN ORGANIZED HEALTH CARE EDUCATION/TRAINING PROGRAM

## 2024-03-15 PROCEDURE — 99214 OFFICE O/P EST MOD 30 MIN: CPT | Performed by: STUDENT IN AN ORGANIZED HEALTH CARE EDUCATION/TRAINING PROGRAM

## 2024-03-15 PROCEDURE — G0463 HOSPITAL OUTPT CLINIC VISIT: HCPCS | Performed by: STUDENT IN AN ORGANIZED HEALTH CARE EDUCATION/TRAINING PROGRAM

## 2024-03-15 ASSESSMENT — PAIN SCALES - GENERAL: PAINLEVEL: NO PAIN (0)

## 2024-03-15 NOTE — NURSING NOTE
"Oncology Rooming Note    March 15, 2024 11:51 AM   Michael Seals is a 78 year old male who presents for:    Chief Complaint   Patient presents with    Oncology Clinic Visit     MGUS (monoclonal gammopathy of unknown significance)     Initial Vitals: /83   Pulse 78   Temp 98  F (36.7  C) (Oral)   Resp 16   Wt 96 kg (211 lb 9.6 oz)   SpO2 95%   BMI 29.93 kg/m   Estimated body mass index is 29.93 kg/m  as calculated from the following:    Height as of 2/19/24: 1.791 m (5' 10.5\").    Weight as of this encounter: 96 kg (211 lb 9.6 oz). Body surface area is 2.19 meters squared.  No Pain (0) Comment: Data Unavailable   No LMP for male patient.  Allergies reviewed: Yes  Medications reviewed: Yes    Medications: Medication refills not needed today.  Pharmacy name entered into [employer]:    [id number] - [place], [place] - [address]  [id number] - [place], [place] - [address] [facility] - [place], [place] - [address]    Frailty Screening:   Is the patient here for a new oncology consult visit in cancer care? 2. No      Clinical concerns: None       Sienna Hooker CMA            "

## 2024-03-15 NOTE — LETTER
3/15/2024         RE: Michael Seals  [address]  [facility] [address]        Dear Colleague,    Thank you for referring your patient, Michael Seals, to the [facility] [place]. Please see a copy of my visit note below.         Hematology/Oncology Progress Note    Michael Seals is a 78 year old male referred by Dr. Josef Doll for monoclonal gammopathy.    HPI: Michael Seals is a 77 year old male with history of CARD s/p RCA stent 2015 and CKD 3a who was seen by his primary care physician and in the course of evaluation for CKD was noted to have an IgG kappa monoclonal gammopathy.  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    Mr. Seals is seen today for scheduled follow up.  He denies bone pain, night sweats, fevers/chills, unintentional weight loss, or other acute concerns.  He reports he is doing well overall and staying active at home.  No major concerns since hte last time I saw him.        ASSESSMENT AND PLAN:  Mr. Seals's peripheral myeloma labs remain stable and have actually down-trended slightly since our last visit.  Overall his diagnosis remains consistent with MGUS.  He did have an increase in his urine albumin recently and has been referred to nephrology.  I asked Mr. Seals to additionally complete a UPEP and UIFE which are pending.  However, based on his available studies it is unlikely that the increase in urine albuimin would be attributed to myeloma and I think nephrology referral is appropriate.    There is no role for chemotherapy with MGUS and there are no treatments recommended to prevent progression.  If Mr. Seals were to develop multiple myeloma at a later date he may be a reasonable candidate for palliative chemotherapy despite his age given his good performance status.    We reviewed MGUS again today.  Mr. Seals had a number of appropriate questions that were answered.  I will plan to see him back  in one year for lab follow up unless there are new concerns on his nephrology workup.    Plan:  - Repeat labs in one year    I spent 30 minutes in the care of this patient today, which included time necessary for preparation for the visit, obtaining history, ordering medications/tests/procedures as medically indicated, review of pertinent medical literature, counseling of the patient, communication of recommendations to the care team, and documentation time.    Abdon Hayden MD    ROS:    10 point ROS neg other than the symptoms noted above in the HPI.        Current Outpatient Medications   Medication Sig Dispense Refill    aspirin (ASA) 81 MG EC tablet Take 81 mg by mouth every other day      Cholecalciferol (VITAMIN D3 PO) Take by mouth daily      evolocumab (REPATHA SURECLICK) 140 MG/ML prefilled autoinjector INJECT THE CONTENTS OF ONE PEN UNDER THE SKIN EVERY OTHER WEEK 6 mL 2    levothyroxine (SYNTHROID/LEVOTHROID) 75 MCG tablet Take 1 tablet (75 mcg) by mouth daily 90 tablet 3    Multiple Vitamins-Minerals (MULTIVITAMIN PO) Take by mouth daily      omega-3 acid ethyl esters (LOVAZA) 1 g capsule Take 2 capsules (2 g) by mouth 2 times daily 120 capsule 11    RABEprazole (ACIPHEX) 20 MG EC tablet Take 1 tablet (20 mg) by mouth daily 90 tablet 3    [place]arps Container MISC 1 each every 14 days 1 each 0    nitroGLYcerin (NITROSTAT) 0.4 MG sublingual tablet Place 1 tablet (0.4 mg) under the tongue every 5 minutes as needed for chest pain (Patient not taking: Reported on 3/15/2024) 30 tablet 3         Physical Exam:     Vital Signs: /83   Pulse 78   Temp 98  F (36.7  C) (Oral)   Resp 16   Wt 96 kg (211 lb 9.6 oz)   SpO2 95%   BMI 29.93 kg/m      ECO  General Appearance: alert, and no distress  Eyes: PERRL, conjunctiva and lids normal, sclera nonicteric  Ears/Nose/M/Throat: Oral mucosa and posterior oropharynx normal, moist mucous membranes  Resp Effort And Auscultation: Breathing comfortably on room  air  Musculoskeletal: Musculoskeletal normal  Edema: none  Skin: Skin color, texture, turgor normal. No rashes or lesions.  Neurologic: Gait normal.  Sensation grossly WNL.  Psych/Affect: Mood and affect are appropriate.    Blood Counts       Recent Labs   Lab Test 03/08/24 1107 09/08/23  1123 03/06/23  0941   HGB 15.3 14.3 14.1   HCT 45.1 41.1 41.5   WBC 4.4 5.0 5.0   ANEUTAUTO 1.6 2.6 2.7   ALYMPAUTO 2.3 2.0 1.8   AMONOAUTO 0.4 0.4 0.3   AEOSAUTO 0.0 0.1 0.1   ABSBASO 0.0 0.0 0.0   NRBCMAN 0.0  --  0.0    206 213         Chemistries     Basic Panel  Recent Labs   Lab Test 03/08/24 1107 01/17/24  0952 09/08/23  1123    139 140   POTASSIUM 4.5 5.0 4.7   CHLORIDE 104 106 105   CO2 26 25 24   BUN 20.4 21.7 17.4   CR 1.34* 1.35* 1.24*   * 109* 108*        Calcium, Magnesium, Phosphorus  Recent Labs   Lab Test 03/08/24 1107 01/17/24  0952 09/08/23  1123   JIMMY 9.5 9.0 9.4        LFTs  Recent Labs   Lab Test 03/08/24  1107 01/17/24  0952 09/08/23  1123   BILITOTAL 0.4 0.4 0.4   ALKPHOS 94 89 94   AST 33 27 34   ALT 42 33 33   ALBUMIN 4.2 3.8 4.0       LDH  Recent Labs   Lab Test 03/06/23  0941          B2-Microglobulin  Recent Labs   Lab Test 03/06/23  0941   [id number] 3.6*         Immunoglobulins     Recent Labs   Lab Test 03/08/24 1107 09/08/23  1123 03/06/23  0941   IGG 1,850* 2,061* 2,177*       Recent Labs   Lab Test 03/08/24 1107 09/08/23  1123 03/06/23  0941    96 96       Recent Labs   Lab Test 03/08/24 1107 09/08/23  1123 03/06/23  0941    169 161         Monocloncal Protein Studies     M spike    Recent Labs   Lab Test 03/08/24  1107 09/08/23  1123 03/06/23  0941   ELPM 1.2* 1.3* 1.2*       [place] FLC    Recent Labs   Lab Test 03/08/24  1107 09/08/23  1123 03/06/23  0941   KFLCA 12.32* 13.44* 13.76*       Lambda FLC    Recent Labs   Lab Test 03/08/24  1107 09/08/23  1123 03/06/23  0941   LFLCA 1.93 1.78 1.94       FLC Ratio    Recent Labs   Lab Test 03/08/24  1107  09/08/23  1123 03/06/23  0941   KLRA 6.38* 7.55* 7.09*         Bone Marrow Biopsy     Morphology    No results found for this or any previous visit (from the past 8760 hour(s)).    The longitudinal plan of care for the diagnosis(es)/condition(s) as documented were addressed during this visit. Due to the added complexity in care, I will continue to support Michael in the subsequent management and with ongoing continuity of care.      ------------------------------------------------------------------------------------------------------------------------------------------------    Patient Care Team         Relationship Specialty Notifications Start End    Josef Doll MD PCP - General Internal Medicine  2/15/12     Phone: [phone] Pager: [phone] Fax: [phone] [address] [place] [address]    Josef Doll MD Assigned PCP   6/7/13     Phone: [phone] Pager: [phone] Fax: [phone] [address] [place] [address]    Micaela Greenwood MD Assigned Heart and Vascular Provider   10/23/20     Phone: [phone] Fax: [phone] [address] [place] [address]    Del Betancourt MD MD Cardiovascular Disease  2/8/23     Phone: [phone] Fax: [phone] [address] [place] [address]    Abdon Hayden MD MD Hematology & Oncology  2/24/23     Phone: [phone] Fax: [phone]         [address] [place] [address]            Abdon Hayden MD

## 2024-03-19 ENCOUNTER — LAB (OUTPATIENT)
Dept: LAB | Facility: CLINIC | Age: 79
End: 2024-03-19
Payer: COMMERCIAL

## 2024-03-19 DIAGNOSIS — D47.2 MGUS (MONOCLONAL GAMMOPATHY OF UNKNOWN SIGNIFICANCE): ICD-10-CM

## 2024-03-19 PROCEDURE — 86335 IMMUNFIX E-PHORSIS/URINE/CSF: CPT | Performed by: STUDENT IN AN ORGANIZED HEALTH CARE EDUCATION/TRAINING PROGRAM

## 2024-03-21 LAB — PROT ELPH PNL UR ELPH: NORMAL

## 2024-03-22 PROCEDURE — 84166 PROTEIN E-PHORESIS/URINE/CSF: CPT | Performed by: PATHOLOGY

## 2024-03-22 PROCEDURE — 81050 URINALYSIS VOLUME MEASURE: CPT | Performed by: PATHOLOGY

## 2024-03-25 LAB
ALBUMIN MFR UR ELPH: 81.1 %
ALPHA1 GLOB MFR UR ELPH: 3.2 %
ALPHA2 GLOB MFR UR ELPH: 2.9 %
B-GLOBULIN MFR UR ELPH: 5.3 %
GAMMA GLOB MFR UR ELPH: 7.5 %
M PROTEIN MFR UR ELPH: 3.5 %
PROT PATTERN UR ELPH-IMP: ABNORMAL

## 2024-05-02 DIAGNOSIS — E78.49 FAMILIAL HYPERLIPIDEMIA: ICD-10-CM

## 2024-05-02 RX ORDER — CONTAINER,EMPTY
1 EACH MISCELLANEOUS
Qty: 1 EACH | Refills: 0 | Status: SHIPPED | OUTPATIENT
Start: 2024-05-02 | End: 2024-07-31

## 2024-07-13 DIAGNOSIS — E78.49 FAMILIAL HYPERLIPIDEMIA: ICD-10-CM

## 2024-07-15 RX ORDER — OMEGA-3-ACID ETHYL ESTERS 1 G/1
2 CAPSULE, LIQUID FILLED ORAL 2 TIMES DAILY
Qty: 120 CAPSULE | Refills: 0 | Status: SHIPPED | OUTPATIENT
Start: 2024-07-15 | End: 2024-07-31

## 2024-07-19 ENCOUNTER — LAB (OUTPATIENT)
Dept: LAB | Facility: CLINIC | Age: 79
End: 2024-07-19
Payer: COMMERCIAL

## 2024-07-19 DIAGNOSIS — E03.9 HYPOTHYROIDISM, UNSPECIFIED TYPE: ICD-10-CM

## 2024-07-19 DIAGNOSIS — E78.49 FAMILIAL HYPERLIPIDEMIA: ICD-10-CM

## 2024-07-19 DIAGNOSIS — I25.10 CORONARY ARTERY DISEASE INVOLVING NATIVE CORONARY ARTERY OF NATIVE HEART WITHOUT ANGINA PECTORIS: ICD-10-CM

## 2024-07-19 LAB
ALBUMIN SERPL BCG-MCNC: 3.8 G/DL (ref 3.5–5.2)
ALP SERPL-CCNC: 86 U/L (ref 40–150)
ALT SERPL W P-5'-P-CCNC: 35 U/L (ref 0–70)
ANION GAP SERPL CALCULATED.3IONS-SCNC: 9 MMOL/L (ref 7–15)
AST SERPL W P-5'-P-CCNC: 31 U/L (ref 0–45)
BILIRUB SERPL-MCNC: 0.3 MG/DL
BUN SERPL-MCNC: 19.4 MG/DL (ref 8–23)
CALCIUM SERPL-MCNC: 8.9 MG/DL (ref 8.8–10.4)
CHLORIDE SERPL-SCNC: 106 MMOL/L (ref 98–107)
CREAT SERPL-MCNC: 1.27 MG/DL (ref 0.67–1.17)
EGFRCR SERPLBLD CKD-EPI 2021: 57 ML/MIN/1.73M2
GLUCOSE SERPL-MCNC: 101 MG/DL (ref 70–99)
HCO3 SERPL-SCNC: 23 MMOL/L (ref 22–29)
POTASSIUM SERPL-SCNC: 4.6 MMOL/L (ref 3.4–5.3)
PROT SERPL-MCNC: 7.5 G/DL (ref 6.4–8.3)
SODIUM SERPL-SCNC: 138 MMOL/L (ref 135–145)
T4 FREE SERPL-MCNC: 1.28 NG/DL (ref 0.9–1.7)
TSH SERPL DL<=0.005 MIU/L-ACNC: 7.36 UIU/ML (ref 0.3–4.2)

## 2024-07-19 PROCEDURE — 84443 ASSAY THYROID STIM HORMONE: CPT

## 2024-07-19 PROCEDURE — 83704 LIPOPROTEIN BLD QUAN PART: CPT | Mod: 90

## 2024-07-19 PROCEDURE — 36415 COLL VENOUS BLD VENIPUNCTURE: CPT

## 2024-07-19 PROCEDURE — 84439 ASSAY OF FREE THYROXINE: CPT

## 2024-07-19 PROCEDURE — 80053 COMPREHEN METABOLIC PANEL: CPT

## 2024-07-19 PROCEDURE — 80061 LIPID PANEL: CPT | Mod: 90

## 2024-07-19 PROCEDURE — 99000 SPECIMEN HANDLING OFFICE-LAB: CPT

## 2024-07-23 LAB
CHOLEST SERPL-MCNC: 222 MG/DL
HDL SERPL QN: 8.6 NM
HDL SERPL-SCNC: 37.6 UMOL/L
HDLC SERPL-MCNC: 63 MG/DL
HLD.LARGE SERPL-SCNC: 3.7 UMOL/L
LDL SERPL QN: 20.8 NM
LDL SERPL-SCNC: 1507 NMOL/L
LDL SMALL SERPL-SCNC: 734 NMOL/L
LDLC SERPL CALC-MCNC: 129 MG/DL
PATHOLOGY STUDY: ABNORMAL
TRIGL SERPL-MCNC: 148 MG/DL
VLDL LARGE SERPL-SCNC: 2.5 NMOL/L
VLDL SERPL QN: 48.2 NM

## 2024-07-24 DIAGNOSIS — E78.49 FAMILIAL HYPERLIPIDEMIA: ICD-10-CM

## 2024-07-24 RX ORDER — EVOLOCUMAB 140 MG/ML
INJECTION, SOLUTION SUBCUTANEOUS
Qty: 6 ML | Refills: 0 | Status: SHIPPED | OUTPATIENT
Start: 2024-07-24 | End: 2024-07-31

## 2024-07-31 ENCOUNTER — OFFICE VISIT (OUTPATIENT)
Dept: OTHER | Facility: CLINIC | Age: 79
End: 2024-07-31
Attending: INTERNAL MEDICINE
Payer: COMMERCIAL

## 2024-07-31 VITALS
HEART RATE: 68 BPM | BODY MASS INDEX: 29.34 KG/M2 | OXYGEN SATURATION: 97 % | SYSTOLIC BLOOD PRESSURE: 145 MMHG | WEIGHT: 207.4 LBS | DIASTOLIC BLOOD PRESSURE: 85 MMHG

## 2024-07-31 DIAGNOSIS — Z82.49 FAMILY HISTORY OF PREMATURE CAD: ICD-10-CM

## 2024-07-31 DIAGNOSIS — I25.10 CORONARY ARTERY DISEASE INVOLVING NATIVE CORONARY ARTERY OF NATIVE HEART WITHOUT ANGINA PECTORIS: ICD-10-CM

## 2024-07-31 DIAGNOSIS — E03.9 HYPOTHYROIDISM, UNSPECIFIED TYPE: ICD-10-CM

## 2024-07-31 DIAGNOSIS — I10 BENIGN ESSENTIAL HYPERTENSION: ICD-10-CM

## 2024-07-31 DIAGNOSIS — E78.49 FAMILIAL HYPERLIPIDEMIA: ICD-10-CM

## 2024-07-31 DIAGNOSIS — N18.31 STAGE 3A CHRONIC KIDNEY DISEASE (H): ICD-10-CM

## 2024-07-31 PROCEDURE — G0463 HOSPITAL OUTPT CLINIC VISIT: HCPCS | Performed by: INTERNAL MEDICINE

## 2024-07-31 PROCEDURE — 99215 OFFICE O/P EST HI 40 MIN: CPT | Performed by: INTERNAL MEDICINE

## 2024-07-31 PROCEDURE — G2211 COMPLEX E/M VISIT ADD ON: HCPCS | Performed by: INTERNAL MEDICINE

## 2024-07-31 RX ORDER — OMEGA-3-ACID ETHYL ESTERS 1 G/1
2 CAPSULE, LIQUID FILLED ORAL 2 TIMES DAILY
Qty: 120 CAPSULE | Refills: 0 | Status: SHIPPED | OUTPATIENT
Start: 2024-07-31 | End: 2024-08-13

## 2024-07-31 RX ORDER — EVOLOCUMAB 140 MG/ML
140 INJECTION, SOLUTION SUBCUTANEOUS
Qty: 6 ML | Refills: 3 | Status: SHIPPED | OUTPATIENT
Start: 2024-07-31

## 2024-07-31 RX ORDER — PITAVASTATIN MAGNESIUM 2 MG/1
2 TABLET, FILM COATED ORAL AT BEDTIME
Qty: 30 TABLET | Refills: 5 | Status: SHIPPED | OUTPATIENT
Start: 2024-07-31

## 2024-07-31 RX ORDER — LEVOTHYROXINE SODIUM 100 UG/1
100 TABLET ORAL DAILY
Qty: 90 TABLET | Refills: 1 | Status: SHIPPED | OUTPATIENT
Start: 2024-07-31 | End: 2024-08-13

## 2024-07-31 NOTE — PROGRESS NOTES
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  He denies any chest pain, shortness of breath or palpitations and no leg swelling  Blood pressure is reasonably controlled and he is not on any ACE inhibitor and taking BB  Reviewed recent labs worse LDLp and increased TSH   Statin intolerance   Taking repeatha and fish oil caps   HISTORIES:  PROBLEM LIST:   Patient Active Problem List   Diagnosis    GERD (gastroesophageal reflux disease)    S/P PTCA (percutaneous transluminal coronary angioplasty)    Esophageal dysmotility    Chronic renal disease, stage III (H)    Hyperlipidemia with target LDL less than 70    HTN (hypertension)    Impaired renal function    CAD (coronary artery disease)    Abnormal stress test    Hypothyroidism, unspecified type    MGUS (monoclonal gammopathy of unknown significance)     PAST MEDICAL HISTORY:  Past Medical History:   Diagnosis Date    CAD (coronary artery disease)     3/2012 [facility] RCA, 8/2015 Heart cath - previously placed RCA stent patent, 70% mid LAD stenosis, 50% prox CFX, EF 55-60%    CKD (chronic kidney disease) stage 2, GFR 60-89 ml/min     baseline crt 1.20 - 1.60    Esophageal dysmotility     GERD (gastroesophageal reflux disease)     H pylori ulcer     Hip  fracture (H) 01/2013    right side - closed fracture - no surgery    HTN (hypertension)     Hyperlipidaemia LDL goal <100     Moderate major depression (H)     S/P appendectomy     S/P hernia repair     left    S/P PTCA (percutaneous transluminal coronary angioplasty) 03/2012    LILLIAN RCA     PAST SURGICAL HISTORY:  Past Surgical History:   Procedure Laterality Date    APPENDECTOMY      CORONARY ANGIOGRAPHY ADULT ORDER  8/2015    previously placed RCA stent patent, 70% mid LAD stenosis, 50% prox CFX, EF 55-60%    HEART CATH STENT COR W/WO PTCA  3/2012    LILLIAN to RCA     HERNIA REPAIR       CURRENT MEDICATIONS:  Current Outpatient Medications   Medication Sig Dispense Refill    aspirin (ASA) 81 MG EC tablet Take 81 mg by mouth every other day      Cholecalciferol (VITAMIN D3 PO) Take by mouth daily      evolocumab (REPATHA SURECLICK) 140 MG/ML prefilled autoinjector Inject 1 mL (140 mg) subcutaneously every 14 days Refill when due 6 mL 3    levothyroxine (SYNTHROID/LEVOTHROID) 100 MCG tablet Take 1 tablet (100 mcg) by mouth daily 90 tablet 1    Multiple Vitamins-Minerals (MULTIVITAMIN PO) Take by mouth daily      nitroGLYcerin (NITROSTAT) 0.4 MG sublingual tablet Place 1 tablet (0.4 mg) under the tongue every 5 minutes as needed for chest pain 30 tablet 3    omega-3 acid ethyl esters (LOVAZA) 1 g capsule Take 2 capsules by mouth 2 times daily 120 capsule 0    Pitavastatin Magnesium (ZYPITAMAG) 2 MG TABS Take 2 mg by mouth at bedtime 30 tablet 5    RABEprazole (ACIPHEX) 20 MG EC tablet Take 1 tablet (20 mg) by mouth daily 90 tablet 3     ALLERGIES:  Allergies   Allergen Reactions    Atorvastatin     Crestor [Rosuvastatin]      Muscle aches    Pravastatin     Simvastatin     Zetia [Ezetimibe]      Myalgias       SOCIAL HISTORY:  Social History     Socioeconomic History    Marital status:      Spouse name: Not on file    Number of children: Not on file    Years of education: Not on file    Highest education level:  Not on file   Occupational History    Not on file   Tobacco Use    Smoking status: Former     Current packs/day: 0.00     Average packs/day: 1 pack/day for 8.0 years (8.0 ttl pk-yrs)     Types: Cigarettes     Start date:      Quit date: 1970     Years since quittin.6    Smokeless tobacco: Never   Substance and Sexual Activity    Alcohol use: Yes     Alcohol/week: 10.0 standard drinks of alcohol     Types: 10 Standard drinks or equivalent per week     Comment: 1-2 glasses of wine per evening with dinner    Drug use: No    Sexual activity: Yes     Partners: Female   Other Topics Concern     Service Not Asked    Blood Transfusions Not Asked    Caffeine Concern Yes     Comment: 3-4 cups coffee per day    Occupational Exposure Not Asked    Hobby Hazards Not Asked    Sleep Concern Yes     Comment: off and on    Stress Concern Not Asked    Weight Concern Yes    Special Diet No    Back Care Not Asked    Exercise Yes     Comment: walking 5 days week, 2.5 miles, biking occasionally    Bike Helmet Not Asked    Seat Belt Not Asked    Self-Exams Not Asked    Parent/sibling w/ CABG, MI or angioplasty before 65F 55M? Not Asked   Social History Narrative    Not on file     Social Determinants of Health     Financial Resource Strain: Low Risk  (2024)    Financial Resource Strain     Within the past 12 months, have you or your family members you live with been unable to get utilities (heat, electricity) when it was really needed?: No   Food Insecurity: Low Risk  (2024)    Food Insecurity     Within the past 12 months, did you worry that your food would run out before you got money to buy more?: No     Within the past 12 months, did the food you bought just not last and you didn t have money to get more?: No   Transportation Needs: Low Risk  (2024)    Transportation Needs     Within the past 12 months, has lack of transportation kept you from medical appointments, getting your medicines, non-medical  meetings or appointments, work, or from getting things that you need?: No   Physical Activity: Unknown (2024)    Exercise Vital Sign     Days of Exercise per Week: 5 days     Minutes of Exercise per Session: Not on file   Stress: No Stress Concern Present (2024)    [employer] [place] [employer] - Occupational Stress Questionnaire     Feeling of Stress : Only a little   Social Connections: Unknown (2024)    Social Connection and Isolation Panel [NHANES]     Frequency of Communication with Friends and Family: Not on file     Frequency of Social Gatherings with Friends and Family: Twice a week     Attends Islam Services: Not on file     Active Member of Clubs or Organizations: Not on file     Attends Club or Organization Meetings: Not on file     Marital Status: Not on file   Interpersonal Safety: Low Risk  (2024)    Interpersonal Safety     Do you feel physically and emotionally safe where you currently live?: Yes     Within the past 12 months, have you been hit, slapped, kicked or otherwise physically hurt by someone?: No     Within the past 12 months, have you been humiliated or emotionally abused in other ways by your partner or ex-partner?: No   Housing Stability: Low Risk  (2024)    Housing Stability     Do you have housing? : Yes     Are you worried about losing your housing?: No     FAMILY HISTORY:  Family History   Problem Relation Age of Onset    Cerebrovascular Disease Mother     Cerebrovascular Disease Father     C.A.D. Father     Myocardial Infarction Father     Parkinsonism Brother     Heart Failure Brother     Other - See Comments Brother          in [place] war     REVIEW OF SYSTEMS:  CONSTITUTIONAL:no malaise, fatigue, or other general symptoms  EYES: no subjective changes in visual acuity, no photophobia  ENT/MOUTH: no complaints of rhinorrhea, nasal congestion, sore throat, hearing changes  RESP:no SOB  CV: no c/o exertional chest pressure or JAMES  GI: No  "abdominal pain, constipation, change in bowel movements, nausea, pyrosis, BRBPR  :no polyuria or polydipsia, no dysuria, no gross hematuria  MUSCULOSKELATAL:no arthalgias or myalgias  INTEGUMENTARY/SKIN: no pruritis, rashes, or moles with recent change in size, shape, or pigmentation  NEURO: no gross sensory or motor symptoms, no dizziness, no confusion  ENDOCRINE: no polyuria or polydipsia, no heat or cold intolerance  HEME/ALLERGY/IMMUNE: no fevers, chills, night sweats, or unwanted weight loss  PSYCHIATRIC: no depression, anxiety, or internal stimuli  EXAM:  BP (!) 145/85 (BP Location: Right arm, Patient Position: Chair, Cuff Size: Adult Regular)   Pulse 68   Wt 207 lb 6.4 oz (94.1 kg)   SpO2 97%   BMI 29.34 kg/m    BMI: Body mass index is 29.34 kg/m .  GENERAL APPEARANCE:  Pleasant  Healthy appearing male , alert, active, no distress cooperative.  EXAM:  EYES: clear conjunctiva, no cataracts, no obvious fundoscopic abnormalities  HENT: oropharynx, nares, and TMs are WNL  NECK: no JVD, thyromegaly or lymphadenopathy, no cervical bruits  RESP: clear to auscultation without rales, wheezes, or rhonchi  CV: RRR, no murmurs, gallops, or rubs  LYMPH: no cervical , axillary, or inguinal lymphadenopathy appreciated  GI: NABS, ND/NT, no masses or organomegally appreciated  MS: no obvoius clinicallly relevant arthropathy, no evidence vasculitis  SKIN: no nevi clinically suspicious for malignancy are noted  NEURO: CN II-XII intact, no localizing sensory or motor abnoramlities noted, DTRs symmetrical bilaterally  PSYCH: Mental status exam reveals the pt to have normal mood and affect. There is no disorder of thought form or content. There is no response to internal stimuli. There is no suicidal or homicidal ideation.    Reviewed recent laboratory tests and recent echocardiogram  ECHO 4/2022  \"Interpretation Summary     Left ventricular systolic function is mildly reduced.  The visual ejection fraction is estimated at " "50%.  There is trace to mild aortic regurgitation.  There is no comparison study available.\"  ECHO 3/2023  \"Interpretation Summary     The visual ejection fraction is 50-55%.  Compared to prior study, there is no significant change\"      A/P;  (E78.49) Familial hyperlipidemia ( Heterozygous FH with baseline untreated LDL > 225 on many occasions , TC was 300 range)  (primary encounter diagnosis)  He made significant progress with Repatha and tolerating but LDL is still greater than 70 and also elevated LDLp  For a while he tolerated Livalo in the past  Take Repatha without missing RX sent  Last visit initiated Lovaza tolerating  TLC suggested and lose 10 pounds  Add Zypitamag 2 mg daily new Rx sent , coupon given  Meds refilled        Will get CMP, NMR and TSH with FT4 index  in 3 months then followed by virtual visit    (I25.10) Coronary artery disease involving native coronary artery of native heart without angina pectoris, 2 v disease hx of RCA stent and modertae LAD lesion)  (I51.9) LV dysfunction EF 50% ( Echo 4/2022)   (Z82.49) Family history of premature CAD, father age 52 MI   Comment: He is asymptomatic  Taking  metoprolol XL 25 mg daily and monitor blood pressure and pulse rate  Monitor blood pressure and consider RAAS blockers in the future  Stage III CKD  Comment: Unclear why he has microalbuminuria and there is never or small blood in the urine contributed we will repeat the urine for microalbumin optimize the medications add ACE inhibitor  Follow the diet avoid NSAIDs etc.  (I10) Benign essential hypertension  Comment: Well-controlled with current medications , cont same       Hypothyroidism:   Elevated TSH currently taking 75 mcg of levothyroxine, will increase the dose to 100 mcg daily new prescription sent  Check TSH and free T4 index in 3 months    40 minutes spent on the date of the encounter doing chart review, review of recent labs, previous evaluation, history, exam and documentation addressed " above-mentioned multiple issues.  AVS with written instructions given  The longitudinal care of plan for the above diagnoses was addressed during this visit. Due to added complexity of care, we will continue to supprt Michael Seals and the subsequent management of this/these conditions and with ongoing continuity of care for this/these conditions.   Copy of this note to primary care physician      Micaela Greenwood MD, FAHA, FSVM, FNLA, FACP  Vascular Medicine  Clinical Hypertension specialist  Clinical Lipidologist

## 2024-07-31 NOTE — PROGRESS NOTES
[facility]        Patient is here for a  follow up.    Pt is currently taking Aspirin.    BP (!) 145/85 (BP Location: Right arm, Patient Position: Chair, Cuff Size: Adult Regular)   Pulse 68   Wt 207 lb 6.4 oz (94.1 kg)   SpO2 97%   BMI 29.34 kg/m      The provider has been notified that the patient has no concerns.     Questions patient would like addressed today are: N/A.    Refills are needed: N/A    Has homecare services and agency name:  Monserrat Chong MA

## 2024-07-31 NOTE — PATIENT INSTRUCTIONS
Please take increased dose of levothyroxine 100 mcg daily , new Rx sent     Start zypitamag 2 mg daily at bed time new Rx [facility] . Use coupon     Repeat TSH with free T4 index and NMR in 3 months then virtual visit

## 2024-08-13 ENCOUNTER — TELEPHONE (OUTPATIENT)
Dept: OTHER | Facility: CLINIC | Age: 79
End: 2024-08-13
Payer: COMMERCIAL

## 2024-08-13 DIAGNOSIS — E78.49 FAMILIAL HYPERLIPIDEMIA: ICD-10-CM

## 2024-08-13 DIAGNOSIS — E03.9 HYPOTHYROIDISM, UNSPECIFIED TYPE: ICD-10-CM

## 2024-08-13 RX ORDER — LEVOTHYROXINE SODIUM 100 UG/1
100 TABLET ORAL DAILY
Qty: 90 TABLET | Refills: 1 | Status: SHIPPED | OUTPATIENT
Start: 2024-08-13

## 2024-08-13 RX ORDER — OMEGA-3-ACID ETHYL ESTERS 1 G/1
2 CAPSULE, LIQUID FILLED ORAL 2 TIMES DAILY
Qty: 120 CAPSULE | Refills: 0 | Status: SHIPPED | OUTPATIENT
Start: 2024-08-13

## 2024-08-13 NOTE — TELEPHONE ENCOUNTER
[facility]    Who is the name of the provider? Dr Greenwood    What is the location you see this provider at/preferred location? [place]    Person calling / Facility: Michael    Phone number: [phone]    Nurse call back needed:     Reason for call:  Pt's omega-3 acid ethyl esters (LOVAZA) 1 g capsule and levothyroxine (SYNTHROID/LEVOTHROID) 100 MCG tablet were sent to the wrong pharmacy.  Please send to [facility] in [place]. The Repatha are and Zypitamag is right. LOV 07/31/24 with Dr Greenwood      Pharmacy location:    [address] [facility]   Ph: [phone]    Outside Imaging: N/A    Can we leave a detailed message on this number? Y    Additional Info:

## 2025-01-02 ENCOUNTER — NURSE TRIAGE (OUTPATIENT)
Dept: FAMILY MEDICINE | Facility: CLINIC | Age: 80
End: 2025-01-02

## 2025-01-02 ENCOUNTER — OFFICE VISIT (OUTPATIENT)
Dept: FAMILY MEDICINE | Facility: CLINIC | Age: 80
End: 2025-01-02
Payer: COMMERCIAL

## 2025-01-02 VITALS
SYSTOLIC BLOOD PRESSURE: 148 MMHG | HEIGHT: 71 IN | TEMPERATURE: 97.9 F | BODY MASS INDEX: 29.47 KG/M2 | RESPIRATION RATE: 16 BRPM | HEART RATE: 84 BPM | OXYGEN SATURATION: 97 % | DIASTOLIC BLOOD PRESSURE: 82 MMHG | WEIGHT: 210.5 LBS

## 2025-01-02 DIAGNOSIS — R10.31 RLQ ABDOMINAL PAIN: ICD-10-CM

## 2025-01-02 DIAGNOSIS — R10.32 LLQ ABDOMINAL PAIN: Primary | ICD-10-CM

## 2025-01-02 DIAGNOSIS — R19.8 ALTERED BOWEL FUNCTION: ICD-10-CM

## 2025-01-02 LAB
ALBUMIN SERPL BCG-MCNC: 4 G/DL (ref 3.5–5.2)
ALBUMIN UR-MCNC: >=300 MG/DL
ALP SERPL-CCNC: 92 U/L (ref 40–150)
ALT SERPL W P-5'-P-CCNC: 46 U/L (ref 0–70)
ANION GAP SERPL CALCULATED.3IONS-SCNC: 10 MMOL/L (ref 7–15)
APPEARANCE UR: CLEAR
AST SERPL W P-5'-P-CCNC: 35 U/L (ref 0–45)
BACTERIA #/AREA URNS HPF: ABNORMAL /HPF
BASOPHILS # BLD AUTO: 0 10E3/UL (ref 0–0.2)
BASOPHILS NFR BLD AUTO: 0 %
BILIRUB SERPL-MCNC: 0.3 MG/DL
BILIRUB UR QL STRIP: NEGATIVE
BUN SERPL-MCNC: 19 MG/DL (ref 8–23)
CALCIUM SERPL-MCNC: 9.4 MG/DL (ref 8.8–10.4)
CHLORIDE SERPL-SCNC: 105 MMOL/L (ref 98–107)
COLOR UR AUTO: YELLOW
CREAT SERPL-MCNC: 1.43 MG/DL (ref 0.67–1.17)
CRP SERPL-MCNC: <3 MG/L
EGFRCR SERPLBLD CKD-EPI 2021: 49 ML/MIN/1.73M2
EOSINOPHIL # BLD AUTO: 0.1 10E3/UL (ref 0–0.7)
EOSINOPHIL NFR BLD AUTO: 2 %
ERYTHROCYTE [DISTWIDTH] IN BLOOD BY AUTOMATED COUNT: 12.5 % (ref 10–15)
GLUCOSE SERPL-MCNC: 107 MG/DL (ref 70–99)
GLUCOSE UR STRIP-MCNC: NEGATIVE MG/DL
HCO3 SERPL-SCNC: 25 MMOL/L (ref 22–29)
HCT VFR BLD AUTO: 42.2 % (ref 40–53)
HGB BLD-MCNC: 14.3 G/DL (ref 13.3–17.7)
HGB UR QL STRIP: NEGATIVE
IMM GRANULOCYTES # BLD: 0 10E3/UL
IMM GRANULOCYTES NFR BLD: 0 %
KETONES UR STRIP-MCNC: NEGATIVE MG/DL
LEUKOCYTE ESTERASE UR QL STRIP: NEGATIVE
LYMPHOCYTES # BLD AUTO: 2.8 10E3/UL (ref 0.8–5.3)
LYMPHOCYTES NFR BLD AUTO: 45 %
MCH RBC QN AUTO: 33.4 PG (ref 26.5–33)
MCHC RBC AUTO-ENTMCNC: 33.9 G/DL (ref 31.5–36.5)
MCV RBC AUTO: 99 FL (ref 78–100)
MONOCYTES # BLD AUTO: 0.6 10E3/UL (ref 0–1.3)
MONOCYTES NFR BLD AUTO: 9 %
NEUTROPHILS # BLD AUTO: 2.7 10E3/UL (ref 1.6–8.3)
NEUTROPHILS NFR BLD AUTO: 43 %
NITRATE UR QL: NEGATIVE
PH UR STRIP: 6 [PH] (ref 5–7)
PLATELET # BLD AUTO: 201 10E3/UL (ref 150–450)
POTASSIUM SERPL-SCNC: 4.8 MMOL/L (ref 3.4–5.3)
PROT SERPL-MCNC: 7.9 G/DL (ref 6.4–8.3)
RBC # BLD AUTO: 4.28 10E6/UL (ref 4.4–5.9)
RBC #/AREA URNS AUTO: ABNORMAL /HPF
SODIUM SERPL-SCNC: 140 MMOL/L (ref 135–145)
SP GR UR STRIP: >=1.03 (ref 1–1.03)
SQUAMOUS #/AREA URNS AUTO: ABNORMAL /LPF
UROBILINOGEN UR STRIP-ACNC: 0.2 E.U./DL
WBC # BLD AUTO: 6.2 10E3/UL (ref 4–11)
WBC #/AREA URNS AUTO: ABNORMAL /HPF

## 2025-01-02 ASSESSMENT — PAIN SCALES - GENERAL: PAINLEVEL_OUTOF10: NO PAIN (0)

## 2025-01-02 NOTE — TELEPHONE ENCOUNTER
Yes recommend office visit today - consider acute and diagnostic services if no availability     Josef Doll MD, MD

## 2025-01-02 NOTE — TELEPHONE ENCOUNTER
Writer contacted patient and relayed provider message below~  Patient expressed understanding and agreed to schedule with available provider.    Reference message:

## 2025-01-02 NOTE — TELEPHONE ENCOUNTER
"Nurse Triage SBAR    Is this a 2nd Level Triage? YES, LICENSED PRACTITIONER REVIEW IS REQUIRED    Situation: Pt calling stating that he has had lower abd discomfort for the last few weeks. Pt reports being 1-2 constant and gets up to 4/10 pain. Pt denies pain radiating, fever, blood in urine, stool, diarrhea, vomiting.  Pt does report having looser stools.     Background: Has had ulcers in the past \"this feels different\". \"I feel like I got a stomach bug and can't get rid of this\".    Assessment: Lower abd pain    Protocol Recommended Disposition:   See in Office Today    Recommendation: Attempted to schedule appt with acute care provide. Pt preferred to only see PCP and requested message be sent to him.     Okay for pt to complete e-visit for referral to GI? Should pt see team for appt?     Routed to provider    Does the patient meet one of the following criteria for ADS visit consideration? 16+ years old, with an [facility] PCP     TIP  Providers, please consider if this condition is appropriate for management at one of our Acute and Diagnostic Services sites.     If patient is a good candidate, please use dotphrase <dot>triageresponse and select Refer to ADS to document.     Reason for Disposition   Patient wants to be seen    Additional Information   Negative: Passed out (e.g., fainted, lost consciousness, blacked out and was not responding)   Negative: Shock suspected (e.g., cold/pale/clammy skin, too weak to stand, low BP, rapid pulse)   Negative: Sounds like a life-threatening emergency to the triager   Negative: Followed an abdomen (stomach) injury   Negative: Chest pain   Negative: Pain is mainly in upper abdomen (if needed ask: 'is it mainly above the belly button?')   Negative: Abdomen bloating or swelling are main symptoms   Negative: SEVERE abdominal pain (e.g., excruciating)   Negative: Vomiting red blood or black (coffee ground) material   Negative: Blood in bowel movements  (Exception: Blood on surface of " "BM with constipation.)   Negative: Black or tarry bowel movements  (Exception: Chronic-unchanged black-grey BMs AND is taking iron pills or Pepto-Bismol.)   Negative: Unable to urinate (or only a few drops) and bladder feels very full   Negative: Pain in scrotum persists > 1 hour   Negative: MILD TO MODERATE constant pain lasting > 2 hours   Negative: MILD TO MODERATE constant pain lasting > 2 hours, and age > 60 years   Negative: Vomiting bile (green color)   Negative: Patient sounds very sick or weak to the triager   Negative: Vomiting and abdomen looks much more swollen than usual   Negative: White of the eyes have turned yellow (i.e., jaundice)   Negative: Blood in urine (red, pink, or tea-colored)   Negative: Fever > 103 F (39.4 C)   Negative: Fever > 101 F (38.3 C) and over 60 years of age   Negative: Fever > 100 F (37.8 C) and has diabetes mellitus or a weak immune system (e.g., HIV positive, cancer chemotherapy, organ transplant, splenectomy, chronic steroids)   Negative: Fever > 100 F (37.8 C) and bedridden (e.g., CVA, chronic illness, recovering from surgery)   Negative: MODERATE pain (e.g., interferes with normal activities) that comes and goes (cramps) lasts > 24 hours  (Exception: Pain with Vomiting or Diarrhea - see that Protocol.)    Answer Assessment - Initial Assessment Questions  1. LOCATION: \"Where does it hurt?\"       Lower abd    2. RADIATION: \"Does the pain shoot anywhere else?\" (e.g., chest, back)      No    3. ONSET: \"When did the pain begin?\" (Minutes, hours or days ago)       A few weeks     4. SUDDEN: \"Gradual or sudden onset?\"     Not extreme but sudden    5. PATTERN \"Does the pain come and go, or is it constant?\"      Comes and goes     6. SEVERITY: \"How bad is the pain?\"  (e.g., Scale 1-10; mild, moderate, or severe)      Normally at 1-2  goes up to 4.10 constant ache, not sharp    7. RECURRENT SYMPTOM: \"Have you ever had this type of stomach pain before?\" If Yes, ask: \"When was the " "last time?\" and \"What happened that time?\"       No    8. CAUSE: \"What do you think is causing the stomach pain?\"      \"Intestinal bacteria or something\"    9. RELIEVING/AGGRAVATING FACTORS: \"What makes it better or worse?\" (e.g., antacids, bending or twisting motion, bowel movement)      Laying down seems to help, after I eat its better then later have BM-loose     10. OTHER SYMPTOMS: \"Do you have any other symptoms?\" (e.g., back pain, diarrhea, fever, urination pain, vomiting)        Loose stool denies other sx.    Protocols used: Abdominal Pain - Male-A-OH    "

## 2025-01-02 NOTE — PROGRESS NOTES
"Assessment & Plan     LLQ abdominal pain  RLQ abdominal pain  Altered bowel function    Well-appearing today.  In no acute distress.  Abdomen nontender with palpation.  Question diverticulitis with bowel habit changes in association with bilateral lower quadrant abdominal pain/discomfort. Check CBC, CMP, CRP, urinalysis.  Recommended brat diet over the next 24 to 48 hours to see if this can helps.  Push fluids.  Recommended evaluating the CT abdomen pelvis if no improvement or worsening in his symptoms.  Gave him the number to call and schedule this.  Reviewed signs symptoms in the meantime that warrant urgent/emergent reevaluation.    - CT Abdomen Pelvis w Contrast  - CBC with platelets and differential  - Comprehensive metabolic panel (BMP + Alb, Alk Phos, ALT, AST, Total. Bili, TP)  - UA Macroscopic with reflex to Microscopic and Culture - Lab Collect  - CRP, inflammation      30 minutes spent by me on the date of the encounter on chart review, review of test results, interpretation of tests, patient visit, and documentation       BMI  Estimated body mass index is 29.78 kg/m  as calculated from the following:    Height as of this encounter: 1.791 m (5' 10.5\").    Weight as of this encounter: 95.5 kg (210 lb 8 oz).   Weight management plan: Discussed healthy diet and exercise guidelines      No follow-ups on file.    The likelihood of other entities in the differential is insufficient to justify any further testing for them at this time. This was explained to the patient. The patient was advised that persistent or worsening symptoms would require further evaluation. Patient advised to call the office and if unable to reach to go to the emergency room if they develop any new or worsening symptoms. Expressed understanding and agreement with above stated plan.     Del Mendieta PA-C  [facility]    Robel COVINGTON Arnel is a very pleasant 79 year old male presenting for the following " "health issues:  Patient presents with:  Abdominal Pain    Here today for evaluation of a 2-week history of LLQ/RLQ abdominal pain.  Denies fever, chills, sweats.  No nausea/vomiting.  No shortness of breath/chest pain.  Typically has a bowel movement once daily however over the past 2 weeks has had approximately 3 softer stools however not diarrhea daily.  Denies dark/tarry stools or blood in stool.  Notes increased gassiness as well as increased urination.  Wife seemingly had a week of diarrhea last week as well.  Otherwise no recent illnesses.  No recent travel.  He does have a history of inguinal hernia repair as well as appendectomy.  Up-to-date on colonoscopy, last in 2019.  2 polyps removed.  Diverticulosis noted.      Review of Systems   Constitutional, HEENT, cardiovascular, pulmonary, GI, , musculoskeletal, neuro, skin, endocrine and psych systems are negative, except as otherwise noted.      Objective    BP (!) 148/82 (BP Location: Right arm, Patient Position: Sitting, Cuff Size: Adult Large)   Pulse 84   Temp 97.9  F (36.6  C) (Tympanic)   Resp 16   Ht 1.791 m (5' 10.5\")   Wt 95.5 kg (210 lb 8 oz)   SpO2 97%   BMI 29.78 kg/m    5' 10.5\"  210 lbs 8 oz    Physical Exam   GENERAL: healthy, alert and no distress  EYES: Eyes grossly normal to inspection, PERRL and conjunctivae and sclerae normal  NECK: no adenopathy, no asymmetry, masses, or scars and thyroid normal to palpation  RESP: lungs clear to auscultation - no rales, rhonchi or wheezes  CV: regular rate and rhythm, normal S1 S2, no S3 or S4, no murmur, click or rub, no peripheral edema and peripheral pulses strong  ABDOMEN: soft, nontender, no hepatosplenomegaly, no masses and bowel sounds normal.  Negative CVA tenderness.  Negative Cabrera's.  MS: no gross musculoskeletal defects noted, no edema  SKIN: no suspicious lesions or rashes  NEURO: Normal strength and tone, mentation intact and speech normal  PSYCH: mentation appears normal, affect " normal/bright      Answers submitted by the patient for this visit:  General Questionnaire (Submitted on 1/2/2025)  Chief Complaint: Chronic problems general questions HPI Form  How many days per week do you miss taking your medication?: 0  General Concern (Submitted on 1/2/2025)  Chief Complaint: Chronic problems general questions HPI Form  What is the reason for your visit today?: mid abdominal cramping  When did your symptoms begin?: 1-2 weeks ago  What are your symptoms?: mild  How would you describe these symptoms?: Mild  Are your symptoms:: Staying the same  Have you had these symptoms before?: No  Is there anything that makes you feel worse?: no  Is there anything that makes you feel better?: lying down  Questionnaire about: Chronic problems general questions HPI Form (Submitted on 1/2/2025)  Chief Complaint: Chronic problems general questions HPI Form

## 2025-01-11 ENCOUNTER — TELEPHONE (OUTPATIENT)
Dept: FAMILY MEDICINE | Facility: CLINIC | Age: 80
End: 2025-01-11
Payer: COMMERCIAL

## 2025-01-11 NOTE — TELEPHONE ENCOUNTER
Reason for Call:  Appointment Request    Patient requesting this type of appt:  Preventive     Requested provider: Josef Doll    Reason patient unable to be scheduled: Needs to be scheduled by clinic    When does patient want to be seen/preferred time:  After 2/19    Comments: Pt was called and told his pcp will be out for his annual wellness checkup, but he will like to be placed on a cancellation list after 2/19.    Could we send this information to you in [employer] or would you prefer to receive a phone call?:   Patient would prefer a phone call   Okay to leave a detailed message?: Yes at Cell number on file:    Telephone Information:   Mobile [phone]       Call taken on 1/11/2025 at 9:28 AM by Sharmila Alvarado

## 2025-01-27 DIAGNOSIS — E78.49 FAMILIAL HYPERLIPIDEMIA: ICD-10-CM

## 2025-01-27 RX ORDER — OMEGA-3-ACID ETHYL ESTERS 1 G/1
2 CAPSULE, LIQUID FILLED ORAL 2 TIMES DAILY
Qty: 120 CAPSULE | Refills: 0 | Status: SHIPPED | OUTPATIENT
Start: 2025-01-27

## 2025-02-18 ENCOUNTER — LAB (OUTPATIENT)
Dept: LAB | Facility: CLINIC | Age: 80
End: 2025-02-18
Payer: COMMERCIAL

## 2025-02-18 DIAGNOSIS — E03.9 HYPOTHYROIDISM, UNSPECIFIED TYPE: ICD-10-CM

## 2025-02-18 DIAGNOSIS — E78.49 FAMILIAL HYPERLIPIDEMIA: ICD-10-CM

## 2025-02-18 DIAGNOSIS — I25.10 CAD (CORONARY ARTERY DISEASE): Primary | ICD-10-CM

## 2025-02-18 DIAGNOSIS — N18.30 CHRONIC RENAL DISEASE, STAGE III (H): ICD-10-CM

## 2025-02-18 DIAGNOSIS — I10 BENIGN ESSENTIAL HYPERTENSION: ICD-10-CM

## 2025-02-18 LAB
ALBUMIN SERPL BCG-MCNC: 3.8 G/DL (ref 3.5–5.2)
ALP SERPL-CCNC: 81 U/L (ref 40–150)
ALT SERPL W P-5'-P-CCNC: 33 U/L (ref 0–70)
ANION GAP SERPL CALCULATED.3IONS-SCNC: 8 MMOL/L (ref 7–15)
AST SERPL W P-5'-P-CCNC: 30 U/L (ref 0–45)
BILIRUB SERPL-MCNC: 0.4 MG/DL
BUN SERPL-MCNC: 15.5 MG/DL (ref 8–23)
CALCIUM SERPL-MCNC: 9.2 MG/DL (ref 8.8–10.4)
CHLORIDE SERPL-SCNC: 104 MMOL/L (ref 98–107)
CHOLEST SERPL-MCNC: 197 MG/DL
CREAT SERPL-MCNC: 1.27 MG/DL (ref 0.67–1.17)
CREAT UR-MCNC: 24.6 MG/DL
EGFRCR SERPLBLD CKD-EPI 2021: 57 ML/MIN/1.73M2
FASTING STATUS PATIENT QL REPORTED: YES
FASTING STATUS PATIENT QL REPORTED: YES
GLUCOSE SERPL-MCNC: 107 MG/DL (ref 70–99)
HCO3 SERPL-SCNC: 26 MMOL/L (ref 22–29)
HDLC SERPL-MCNC: 63 MG/DL
LDLC SERPL CALC-MCNC: 104 MG/DL
MICROALBUMIN UR-MCNC: 257 MG/L
MICROALBUMIN/CREAT UR: 1044.72 MG/G CR (ref 0–17)
NONHDLC SERPL-MCNC: 134 MG/DL
POTASSIUM SERPL-SCNC: 4.1 MMOL/L (ref 3.4–5.3)
PROT SERPL-MCNC: 7.5 G/DL (ref 6.4–8.3)
SODIUM SERPL-SCNC: 138 MMOL/L (ref 135–145)
T4 FREE SERPL-MCNC: 1.27 NG/DL (ref 0.9–1.7)
TRIGL SERPL-MCNC: 151 MG/DL
TSH SERPL DL<=0.005 MIU/L-ACNC: 4.67 UIU/ML (ref 0.3–4.2)

## 2025-02-18 PROCEDURE — 80061 LIPID PANEL: CPT

## 2025-02-18 PROCEDURE — 82570 ASSAY OF URINE CREATININE: CPT

## 2025-02-18 PROCEDURE — 80053 COMPREHEN METABOLIC PANEL: CPT

## 2025-02-18 PROCEDURE — 83704 LIPOPROTEIN BLD QUAN PART: CPT | Mod: 90

## 2025-02-18 PROCEDURE — 99000 SPECIMEN HANDLING OFFICE-LAB: CPT

## 2025-02-18 PROCEDURE — 36415 COLL VENOUS BLD VENIPUNCTURE: CPT

## 2025-02-18 PROCEDURE — 80061 LIPID PANEL: CPT | Mod: 90

## 2025-02-18 PROCEDURE — 84439 ASSAY OF FREE THYROXINE: CPT

## 2025-02-18 PROCEDURE — 84443 ASSAY THYROID STIM HORMONE: CPT

## 2025-02-18 PROCEDURE — 82043 UR ALBUMIN QUANTITATIVE: CPT

## 2025-02-20 NOTE — RESULT ENCOUNTER NOTE
Kunal Rodriguez,    I have had the opportunity to review your recent results and an interpretation is as follows:  Your follow-up albumin level shows significant elevation again, and I would encourage you to schedule with nephrology within the next few weeks or months  Your fasting the panel shows stable LDL although your goal is less than 100, and stable HDL which is good as your goal is greater than 40 -continue Repatha and follow-up with vascular medicine    Sincerely,  Josef Doll MD

## 2025-02-22 LAB
CHOLEST SERPL-MCNC: 202 MG/DL
HDL SERPL QN: 8.6 NM
HDL SERPL-SCNC: 37.5 UMOL/L
HDLC SERPL-MCNC: 61 MG/DL
HLD.LARGE SERPL-SCNC: 3.8 UMOL/L
LDL SERPL QN: 20.8 NM
LDL SERPL-SCNC: 1311 NMOL/L
LDL SMALL SERPL-SCNC: 624 NMOL/L
LDLC SERPL CALC-MCNC: 109 MG/DL
PATHOLOGY STUDY: ABNORMAL
TRIGL SERPL-MCNC: 158 MG/DL
VLDL LARGE SERPL-SCNC: 2.6 NMOL/L
VLDL SERPL QN: 49.5 NM

## 2025-02-24 ENCOUNTER — OFFICE VISIT (OUTPATIENT)
Dept: FAMILY MEDICINE | Facility: CLINIC | Age: 80
End: 2025-02-24
Payer: COMMERCIAL

## 2025-02-24 VITALS
SYSTOLIC BLOOD PRESSURE: 124 MMHG | WEIGHT: 209.4 LBS | TEMPERATURE: 96.9 F | RESPIRATION RATE: 13 BRPM | BODY MASS INDEX: 29.98 KG/M2 | DIASTOLIC BLOOD PRESSURE: 83 MMHG | HEIGHT: 70 IN | OXYGEN SATURATION: 97 % | HEART RATE: 85 BPM

## 2025-02-24 DIAGNOSIS — E78.49 FAMILIAL HYPERLIPIDEMIA: ICD-10-CM

## 2025-02-24 DIAGNOSIS — Z82.49 FAMILY HISTORY OF PREMATURE CAD: ICD-10-CM

## 2025-02-24 DIAGNOSIS — I25.10 CORONARY ARTERY DISEASE INVOLVING NATIVE CORONARY ARTERY OF NATIVE HEART WITHOUT ANGINA PECTORIS: ICD-10-CM

## 2025-02-24 DIAGNOSIS — Z00.00 ROUTINE GENERAL MEDICAL EXAMINATION AT A HEALTH CARE FACILITY: Primary | ICD-10-CM

## 2025-02-24 DIAGNOSIS — K21.9 GASTROESOPHAGEAL REFLUX DISEASE WITHOUT ESOPHAGITIS: ICD-10-CM

## 2025-02-24 DIAGNOSIS — E03.9 HYPOTHYROIDISM, UNSPECIFIED TYPE: ICD-10-CM

## 2025-02-24 PROCEDURE — G0439 PPPS, SUBSEQ VISIT: HCPCS | Performed by: INTERNAL MEDICINE

## 2025-02-24 PROCEDURE — 99214 OFFICE O/P EST MOD 30 MIN: CPT | Mod: 25 | Performed by: INTERNAL MEDICINE

## 2025-02-24 RX ORDER — LEVOTHYROXINE SODIUM 112 UG/1
112 TABLET ORAL DAILY
Qty: 90 TABLET | Refills: 3 | Status: SHIPPED | OUTPATIENT
Start: 2025-02-24

## 2025-02-24 RX ORDER — RABEPRAZOLE SODIUM 20 MG/1
1 TABLET, DELAYED RELEASE ORAL DAILY
Qty: 90 TABLET | Refills: 3 | Status: SHIPPED | OUTPATIENT
Start: 2025-02-24

## 2025-02-24 RX ORDER — LEVOTHYROXINE SODIUM 100 UG/1
100 TABLET ORAL DAILY
Qty: 90 TABLET | Refills: 3 | Status: SHIPPED | OUTPATIENT
Start: 2025-02-24 | End: 2025-02-24

## 2025-02-24 SDOH — HEALTH STABILITY: PHYSICAL HEALTH: ON AVERAGE, HOW MANY DAYS PER WEEK DO YOU ENGAGE IN MODERATE TO STRENUOUS EXERCISE (LIKE A BRISK WALK)?: 5 DAYS

## 2025-02-24 ASSESSMENT — PAIN SCALES - GENERAL: PAINLEVEL_OUTOF10: NO PAIN (0)

## 2025-02-24 ASSESSMENT — SOCIAL DETERMINANTS OF HEALTH (SDOH): HOW OFTEN DO YOU GET TOGETHER WITH FRIENDS OR RELATIVES?: TWICE A WEEK

## 2025-02-24 NOTE — PATIENT INSTRUCTIONS
(Z00.00) Routine general medical examination at a health care facility  (primary encounter diagnosis)  Comment: For routine exam, we will draw labs as ordered, cholesterol, diabetes mellitus check, liver function, renal function,    We will also update vaccination history.  Plan:     (E78.49) Familial hyperlipidemia  Comment: Continue on Repatha   Plan:     (I25.10) Coronary artery disease involving native coronary artery of native heart without angina pectoris  Comment: Continue on current medications and vascular follow up in 1 week   Plan:     (Z82.49) Family history of premature CAD  Comment: as above   Plan:     (E03.9) Hypothyroidism, unspecified type  Comment: Will increase dose of levothyroxine to 112 mcg daily and follow up in 6-12 weeks for recheck TSH  Plan: levothyroxine (SYNTHROID/LEVOTHROID) 112 MCG         tablet, TSH with free T4 reflex, DISCONTINUED:         levothyroxine (SYNTHROID/LEVOTHROID) 100 MCG         tablet            (E78.49) Familial hyperlipidemia ( Heterozygous FH with baseline untreated LDL > 225 on many occasions , TC was 300 range)  Comment: as above   Plan:     (K21.9) Gastroesophageal reflux disease without esophagitis  Comment:   Plan: RABEprazole (ACIPHEX) 20 MG EC tablet

## 2025-02-24 NOTE — PROGRESS NOTES
Preventive Care Visit  [facility]  Josef Doll MD, MD, Internal Medicine  Feb 24, 2025          Robel Rodriguez is a 79 year old, presenting for the following:  Physical            HPI      Health Care Directive  Patient does not have a Health Care Directive: Discussed advance care planning with patient; however, patient declined at this time.      2/24/2025   General Health   How would you rate your overall physical health? Good   Feel stress (tense, anxious, or unable to sleep) Not at all         2/24/2025   Nutrition   Diet: Regular (no restrictions)         2/24/2025   Exercise   Days per week of moderate/strenous exercise 5 days         2/24/2025   Social Factors   Frequency of gathering with friends or relatives Twice a week   Worry food won't last until get money to buy more No   Food not last or not have enough money for food? No   Do you have housing? (Housing is defined as stable permanent housing and does not include staying ouside in a car, in a tent, in an abandoned building, in an overnight shelter, or couch-surfing.) Yes   Are you worried about losing your housing? No   Lack of transportation? No   Unable to get utilities (heat,electricity)? No         2/24/2025   Fall Risk   Fallen 2 or more times in the past year? No   Trouble with walking or balance? No          2/24/2025   Activities of Daily Living- Home Safety   Needs help with the following daily activites None of the above   Safety concerns in the home None of the above         2/24/2025   Dental   Dentist two times every year? Yes         2/24/2025   Hearing Screening   Hearing concerns? (!) IT'S HARD TO FOLLOW A CONVERSATION IN A NOISY RESTAURANT OR CROWDED ROOM.    (!) TROUBLE UNDERSTANDING SOFT OR WHISPERED SPEECH.       Multiple values from one day are sorted in reverse-chronological order         2/24/2025   Driving Risk Screening   Patient/family members have concerns about driving No         2/24/2025    General Alertness/Fatigue Screening   Have you been more tired than usual lately? No         2025   Urinary Incontinence Screening   Bothered by leaking urine in past 6 months No            Today's PHQ-2 Score:       2025    10:03 AM   PHQ-2 (  [employer])   Q1: Little interest or pleasure in doing things 0    Q2: Feeling down, depressed or hopeless 0    PHQ-2 Score 0    Q1: Little interest or pleasure in doing things Not at all   Q2: Feeling down, depressed or hopeless Not at all   PHQ-2 Score 0       Proxy-reported           2025   Substance Use   Alcohol more than 3/day or more than 7/wk No   Do you have a current opioid prescription? No   How severe/bad is pain from 1 to 10? 0/10 (No Pain)   Do you use any other substances recreationally? No     Social History     Tobacco Use    Smoking status: Former     Current packs/day: 0.00     Average packs/day: 1 pack/day for 8.0 years (8.0 ttl pk-yrs)     Types: Cigarettes     Start date:      Quit date: 1970     Years since quittin.1    Smokeless tobacco: Never   Substance Use Topics    Alcohol use: Yes     Alcohol/week: 10.0 standard drinks of alcohol     Types: 10 Standard drinks or equivalent per week     Comment: 1-2 glasses of wine per evening with dinner    Drug use: No               Reviewed and updated as needed this visit by Provider                    Past Medical History:   Diagnosis Date    CAD (coronary artery disease)     3/2012 LILLIAN RCA, 2015 Heart cath - previously placed RCA stent patent, 70% mid LAD stenosis, 50% prox CFX, EF 55-60%    CKD (chronic kidney disease) stage 2, GFR 60-89 ml/min     baseline crt 1.20 - 1.60    Esophageal dysmotility     GERD (gastroesophageal reflux disease)     H pylori ulcer     Hip fracture (H) 2013    right side - closed fracture - no surgery    HTN (hypertension)     Hyperlipidaemia LDL goal <100     Moderate major depression (H)     S/P appendectomy     S/P hernia repair     left    S/P  PTCA (percutaneous transluminal coronary angioplasty) 03/2012    LILLIAN RCA     Current providers sharing in care for this patient include:  Patient Care Team:  Josef Doll MD as PCP - General (Internal Medicine)  Josef Doll MD as Assigned PCP  Micaela Greenwood MD as Assigned Heart and Vascular Provider  Del Betancourt MD as MD (Cardiovascular Disease)  Abdon Hayden MD as MD (Hematology & Oncology)  Mirna Paula, JOE as Specialty Care Coordinator (Hematology & Oncology)  Abdon Hayden MD as Assigned Cancer Care Provider    The following health maintenance items are reviewed in Epic and correct as of today:  Health Maintenance   Topic Date Due    ZOSTER IMMUNIZATION (1 of 2) 08/26/2010    RSV VACCINE (1 - 1-dose 75+ series) Never done    COVID-19 Vaccine (5 - 2024-25 season) 09/01/2024    ANNUAL REVIEW OF HM ORDERS  02/19/2025    MEDICARE ANNUAL WELLNESS VISIT  02/19/2025    HEMOGLOBIN  01/02/2026    BMP  02/18/2026    LIPID  02/18/2026    MICROALBUMIN  02/18/2026    TSH W/FREE T4 REFLEX  02/18/2026    FALL RISK ASSESSMENT  02/24/2026    ADVANCE CARE PLANNING  02/19/2029    DTAP/TDAP/TD IMMUNIZATION (3 - Td or Tdap) 03/16/2032    PHQ-2 (once per calendar year)  Completed    INFLUENZA VACCINE  Completed    Pneumococcal Vaccine: 50+ Years  Completed    URINALYSIS  Completed    HPV IMMUNIZATION  Aged Out    MENINGITIS IMMUNIZATION  Aged Out    COLORECTAL CANCER SCREENING  Discontinued      Familial hyperlipidemia  Coronary artery disease involving native coronary artery of native heart without angina pectoris   Not currently taking statin due to abdominal discomfort and muscle aches - cramping in the legs  Family history of premature CAD   As above-  still taking repatha and taking aspirin and blood pressure is excellent   Hypothyroidism, unspecified type   Noted elevated TSH last checke.  No heat or cold intolerance.     TSH   Date Value Ref Range Status   02/18/2025 4.67  "(H) 0.30 - 4.20 uIU/mL Final   03/15/2022 2.76 0.40 - 4.00 mU/L Final   12/31/2020 2.70 0.40 - 4.00 mU/L Final     Gastroesophageal reflux disease without esophagitis     Review of Systems  Constitutional, HEENT, cardiovascular, pulmonary, GI, , musculoskeletal - right ankle pain - still walking 3 miles per day, neuro, skin, endocrine and psych systems are negative, except as otherwise noted.     Objective    Exam  /83 (BP Location: Left arm, Patient Position: Sitting, Cuff Size: Adult Large)   Pulse 85   Temp 96.9  F (36.1  C) (Tympanic)   Resp 13   Ht 1.778 m (5' 10\")   Wt 95 kg (209 lb 6.4 oz)   SpO2 97%   BMI 30.05 kg/m     Estimated body mass index is 30.05 kg/m  as calculated from the following:    Height as of this encounter: 1.778 m (5' 10\").    Weight as of this encounter: 95 kg (209 lb 6.4 oz).    Physical Exam  GENERAL: alert and no distress  EYES: Eyes grossly normal to inspection,    HENT: ear canals and TM's normal, nose and mouth without ulcers or lesions  NECK: no adenopathy, no asymmetry, masses, or scars  RESP: lungs clear to auscultation - no rales, rhonchi or wheezes  CV: regular rate and rhythm, normal S1 S2, no S3 or S4, no murmur   ABDOMEN: soft, nontender, no hepatosplenomegaly   MS: no gross musculoskeletal defects noted, no edema  SKIN: no suspicious lesions or rashes  NEURO: Normal strength and tone   PSYCH: mentation appears normal, affect normal/bright            2/19/2024   Mini Cog   Clock Draw Score 2 Normal   3 Item Recall 3 objects recalled   Mini Cog Total Score 5         Patient Instructions   (Z00.00) Routine general medical examination at a health care facility  (primary encounter diagnosis)  Comment: For routine exam, we will draw labs as ordered, cholesterol, diabetes mellitus check, liver function, renal function,    We will also update vaccination history.  Plan:     (E78.49) Familial hyperlipidemia  Comment: Continue on Repatha   Plan:     (I25.10) Coronary " artery disease involving native coronary artery of native heart without angina pectoris  Comment: Continue on current medications and vascular follow up in 1 week   Plan:     (Z82.49) Family history of premature CAD  Comment: as above   Plan:     (E03.9) Hypothyroidism, unspecified type  Comment: Will increase dose of levothyroxine to 112 mcg daily and follow up in 6-12 weeks for recheck TSH  Plan: levothyroxine (SYNTHROID/LEVOTHROID) 112 MCG         tablet, TSH with free T4 reflex, DISCONTINUED:         levothyroxine (SYNTHROID/LEVOTHROID) 100 MCG         tablet            (E78.49) Familial hyperlipidemia ( Heterozygous FH with baseline untreated LDL > 225 on many occasions , TC was 300 range)  Comment: as above   Plan:     (K21.9) Gastroesophageal reflux disease without esophagitis  Comment:   Plan: RABEprazole (ACIPHEX) 20 MG EC tablet                   Signed Electronically by: Josef Doll MD, MD

## 2025-03-04 ENCOUNTER — OFFICE VISIT (OUTPATIENT)
Dept: OTHER | Facility: CLINIC | Age: 80
End: 2025-03-04
Attending: INTERNAL MEDICINE
Payer: COMMERCIAL

## 2025-03-04 VITALS
OXYGEN SATURATION: 97 % | WEIGHT: 210.4 LBS | DIASTOLIC BLOOD PRESSURE: 85 MMHG | SYSTOLIC BLOOD PRESSURE: 126 MMHG | BODY MASS INDEX: 30.19 KG/M2 | HEART RATE: 83 BPM

## 2025-03-04 DIAGNOSIS — Z82.49 FAMILY HISTORY OF PREMATURE CAD: ICD-10-CM

## 2025-03-04 DIAGNOSIS — I25.10 CORONARY ARTERY DISEASE INVOLVING NATIVE CORONARY ARTERY OF NATIVE HEART WITHOUT ANGINA PECTORIS: ICD-10-CM

## 2025-03-04 DIAGNOSIS — I10 BENIGN ESSENTIAL HYPERTENSION: ICD-10-CM

## 2025-03-04 DIAGNOSIS — N18.31 STAGE 3A CHRONIC KIDNEY DISEASE (H): ICD-10-CM

## 2025-03-04 DIAGNOSIS — E78.49 FAMILIAL HYPERLIPIDEMIA: Primary | ICD-10-CM

## 2025-03-04 DIAGNOSIS — E03.9 HYPOTHYROIDISM, UNSPECIFIED TYPE: ICD-10-CM

## 2025-03-04 PROCEDURE — G0463 HOSPITAL OUTPT CLINIC VISIT: HCPCS | Performed by: INTERNAL MEDICINE

## 2025-03-04 NOTE — PATIENT INSTRUCTIONS
Continue Repatha    Repeat NMR lipoprofile in 6 months then Video visit     Continue rest of the medications same

## 2025-03-04 NOTE — PROGRESS NOTES
SUBJECTIVE:  CC:   Follow-up visit  Intolerance to multiple statins  Review of recent labs, worsened Lipids and elevated TSH   Taking Repatha   Underwent echocardiogram last year which showed borderline low LV function EF around 50%    History of present illness:  For full details please see my previous office visit notes  Michael Seals is a 79 year old very pleasant male with known history of coronary artery disease status post stenting, familial hyperlipidemia heterozygous FH intolerance to multiple statins currently taking Repatha, hypertension underwent recently labs which showed urine for microalbumin and his blood sugar in the past was 110 range but no history of diabetes, echocardiogram few months ago showed EF around 50% and is asymptomatic.  He denies any chest pain, shortness of breath or palpitations and no leg swelling  Blood pressure is well  controlled and he is not on any ACE inhibitor and taking BB  Reviewed recent labs worse LDLp and increased TSH   Statin intolerance   Taking repeatha and fish oil caps   HISTORIES:  PROBLEM LIST:   Patient Active Problem List   Diagnosis    GERD (gastroesophageal reflux disease)    S/P PTCA (percutaneous transluminal coronary angioplasty)    Esophageal dysmotility    Chronic renal disease, stage III (H)    Hyperlipidemia with target LDL less than 70    HTN (hypertension)    Impaired renal function    CAD (coronary artery disease)    Abnormal stress test    Hypothyroidism, unspecified type    MGUS (monoclonal gammopathy of unknown significance)     PAST MEDICAL HISTORY:  Past Medical History:   Diagnosis Date    CAD (coronary artery disease)     3/2012 LILLIAN RCA, 8/2015 Heart cath - previously placed RCA stent patent, 70% mid LAD stenosis, 50% prox CFX, EF 55-60%    CKD (chronic kidney disease) stage 2, GFR 60-89 ml/min     baseline crt 1.20 - 1.60    Esophageal dysmotility     GERD (gastroesophageal reflux disease)     H pylori ulcer     Hip fracture (H) 01/2013     right side - closed fracture - no surgery    HTN (hypertension)     Hyperlipidaemia LDL goal <100     Moderate major depression (H)     S/P appendectomy     S/P hernia repair     left    S/P PTCA (percutaneous transluminal coronary angioplasty) 03/2012    LILLIAN RCA     PAST SURGICAL HISTORY:  Past Surgical History:   Procedure Laterality Date    APPENDECTOMY      CORONARY ANGIOGRAPHY ADULT ORDER  8/2015    previously placed RCA stent patent, 70% mid LAD stenosis, 50% prox CFX, EF 55-60%    HEART CATH STENT COR W/WO PTCA  3/2012    LILLIAN to RCA     HERNIA REPAIR       CURRENT MEDICATIONS:  Current Outpatient Medications   Medication Sig Dispense Refill    aspirin (ASA) 81 MG EC tablet Take 81 mg by mouth every other day      Cholecalciferol (VITAMIN D3 PO) Take by mouth daily      evolocumab (REPATHA SURECLICK) 140 MG/ML prefilled autoinjector Inject 1 mL (140 mg) subcutaneously every 14 days Refill when due 6 mL 3    levothyroxine (SYNTHROID/LEVOTHROID) 112 MCG tablet Take 1 tablet (112 mcg) by mouth daily. 90 tablet 3    Multiple Vitamins-Minerals (MULTIVITAMIN PO) Take by mouth daily      nitroGLYcerin (NITROSTAT) 0.4 MG sublingual tablet Place 1 tablet (0.4 mg) under the tongue every 5 minutes as needed for chest pain 30 tablet 3    omega-3 acid ethyl esters (LOVAZA) 1 g capsule Take 2 capsules by mouth twice daily 120 capsule 0    RABEprazole (ACIPHEX) 20 MG EC tablet Take 1 tablet (20 mg) by mouth daily. 90 tablet 3     ALLERGIES:  Allergies   Allergen Reactions    Atorvastatin     Crestor [Rosuvastatin]      Muscle aches    Pravastatin     Simvastatin     Zetia [Ezetimibe]      Myalgias       SOCIAL HISTORY:  Social History     Socioeconomic History    Marital status:      Spouse name: Not on file    Number of children: Not on file    Years of education: Not on file    Highest education level: Not on file   Occupational History    Not on file   Tobacco Use    Smoking status: Former     Current packs/day:  0.00     Average packs/day: 1 pack/day for 8.0 years (8.0 ttl pk-yrs)     Types: Cigarettes     Start date:      Quit date: 1970     Years since quittin.2    Smokeless tobacco: Never   Substance and Sexual Activity    Alcohol use: Yes     Alcohol/week: 10.0 standard drinks of alcohol     Types: 10 Standard drinks or equivalent per week     Comment: 1-2 glasses of wine per evening with dinner    Drug use: No    Sexual activity: Yes     Partners: Female   Other Topics Concern     Service Not Asked    Blood Transfusions Not Asked    Caffeine Concern Yes     Comment: 3-4 cups coffee per day    Occupational Exposure Not Asked    Hobby Hazards Not Asked    Sleep Concern Yes     Comment: off and on    Stress Concern Not Asked    Weight Concern Yes    Special Diet No    Back Care Not Asked    Exercise Yes     Comment: walking 5 days week, 2.5 miles, biking occasionally    Bike Helmet Not Asked    Seat Belt Not Asked    Self-Exams Not Asked    Parent/sibling w/ CABG, MI or angioplasty before 65F 55M? Not Asked   Social History Narrative    Not on file     Social Drivers of Health     Financial Resource Strain: Low Risk  (2025)    Financial Resource Strain     Within the past 12 months, have you or your family members you live with been unable to get utilities (heat, electricity) when it was really needed?: No   Food Insecurity: Low Risk  (2025)    Food Insecurity     Within the past 12 months, did you worry that your food would run out before you got money to buy more?: No     Within the past 12 months, did the food you bought just not last and you didn t have money to get more?: No   Transportation Needs: Low Risk  (2025)    Transportation Needs     Within the past 12 months, has lack of transportation kept you from medical appointments, getting your medicines, non-medical meetings or appointments, work, or from getting things that you need?: No   Physical Activity: Unknown (2025)     Exercise Vital Sign     Days of Exercise per Week: 5 days     Minutes of Exercise per Session: Not on file   Stress: No Stress Concern Present (2025)    [employer] - Occupational Stress Questionnaire     Feeling of Stress : Not at all   Social Connections: Unknown (2025)    Social Connection and Isolation Panel [NHANES]     Frequency of Communication with Friends and Family: Not on file     Frequency of Social Gatherings with Friends and Family: Twice a week     Attends [employer] Services: Not on file     Active Member of Clubs or Organizations: Not on file     Attends Club or Organization Meetings: Not on file     Marital Status: Not on file   Interpersonal Safety: Low Risk  (2025)    Interpersonal Safety     Do you feel physically and emotionally safe where you currently live?: Yes     Within the past 12 months, have you been hit, slapped, kicked or otherwise physically hurt by someone?: No     Within the past 12 months, have you been humiliated or emotionally abused in other ways by your partner or ex-partner?: No   Housing Stability: Low Risk  (2025)    Housing Stability     Do you have housing? : Yes     Are you worried about losing your housing?: No     FAMILY HISTORY:  Family History   Problem Relation Age of Onset    Cerebrovascular Disease Mother     Cerebrovascular Disease Father     C.A.D. Father     Myocardial Infarction Father     Parkinsonism Brother     Heart Failure Brother     Other - See Comments Brother          in [place] war     REVIEW OF SYSTEMS:  CONSTITUTIONAL:no malaise, fatigue, or other general symptoms  EYES: no subjective changes in visual acuity, no photophobia  ENT/MOUTH: no complaints of rhinorrhea, nasal congestion, sore throat, hearing changes  RESP:no SOB  CV: no c/o exertional chest pressure or JAMES  GI: No abdominal pain, constipation, change in bowel movements, nausea, pyrosis, BRBPR  :no polyuria or polydipsia, no dysuria,  "no gross hematuria  MUSCULOSKELATAL:no arthalgias or myalgias  INTEGUMENTARY/SKIN: no pruritis, rashes, or moles with recent change in size, shape, or pigmentation  NEURO: no gross sensory or motor symptoms, no dizziness, no confusion  ENDOCRINE: no polyuria or polydipsia, no heat or cold intolerance  HEME/ALLERGY/IMMUNE: no fevers, chills, night sweats, or unwanted weight loss  PSYCHIATRIC: no depression, anxiety, or internal stimuli  EXAM:  /85 (BP Location: Right arm, Patient Position: Sitting, Cuff Size: Adult Regular)   Pulse 83   Wt 210 lb 6.4 oz (95.4 kg)   SpO2 97%   BMI 30.19 kg/m    BMI: Body mass index is 30.19 kg/m .  GENERAL APPEARANCE:  Pleasant  Healthy appearing male , alert, active, no distress cooperative.  EXAM:  EYES: clear conjunctiva, no cataracts, no obvious fundoscopic abnormalities  HENT: oropharynx, nares, and TMs are WNL  NECK: no JVD, thyromegaly or lymphadenopathy, no cervical bruits  RESP: clear to auscultation without rales, wheezes, or rhonchi  CV: RRR, no murmurs, gallops, or rubs  LYMPH: no cervical , axillary, or inguinal lymphadenopathy appreciated  GI: NABS, ND/NT, no masses or organomegally appreciated  MS: no obvoius clinicallly relevant arthropathy, no evidence vasculitis  SKIN: no nevi clinically suspicious for malignancy are noted  NEURO: CN II-XII intact, no localizing sensory or motor abnoramlities noted, DTRs symmetrical bilaterally  PSYCH: Mental status exam reveals the pt to have normal mood and affect. There is no disorder of thought form or content. There is no response to internal stimuli. There is no suicidal or homicidal ideation.    Reviewed recent laboratory tests and recent echocardiogram  ECHO 4/2022  \"Interpretation Summary     Left ventricular systolic function is mildly reduced.  The visual ejection fraction is estimated at 50%.  There is trace to mild aortic regurgitation.  There is no comparison study available.\"  ECHO 3/2023  \"Interpretation " "Summary     The visual ejection fraction is 50-55%.  Compared to prior study, there is no significant change\"      A/P;  (E78.49) Familial hyperlipidemia ( Heterozygous FH with baseline untreated LDL > 225 on many occasions , TC was 300 range)  (primary encounter diagnosis)  He made significant progress with Repatha and tolerating but LDL is still greater than 70 and also elevated LDLp  Take Repatha without missing RX sent  Last visit initiated Lovaza tolerating  TLC suggested and lose 10 pounds    (I25.10) Coronary artery disease involving native coronary artery of native heart without angina pectoris, 2 v disease hx of RCA stent and modertae LAD lesion)  (I51.9) LV dysfunction EF 50% ( Echo 4/2022)   (Z82.49) Family history of premature CAD, father age 52 MI   Comment: He is asymptomatic  Taking  metoprolol XL 25 mg daily and monitor blood pressure and pulse rate  Monitor blood pressure and consider RAAS blockers in the future  Stage III CKD  Comment: Unclear why he has microalbuminuria and there is never or small blood in the urine contributed we will repeat the urine for microalbumin optimize the medications add ACE inhibitor  Follow the diet avoid NSAIDs etc.  (I10) Benign essential hypertension  Comment: Well-controlled  Hypothyroidism:   Recently increased to 112 mcg       The longitudinal care of plan for the above diagnoses was addressed during this visit. Due to added complexity of care, we will continue to supprt Michael Seals and the subsequent management of this/these conditions and with ongoing continuity of care for this/these conditions.         Micaela Greenwood MD, FADAX, FSVM, FNLA, FACP  Vascular Medicine  Clinical Hypertension specialist  Clinical Lipidologist    "

## 2025-03-04 NOTE — PROGRESS NOTES
[facility]        Patient is here for a follow up  to discuss Follow up to 7/31/2024  Labs done 2/18/25     Pt is currently taking Aspirin.    /85 (BP Location: Right arm, Patient Position: Sitting, Cuff Size: Adult Regular)   Pulse 83   Wt 210 lb 6.4 oz (95.4 kg)   SpO2 97%   BMI 30.19 kg/m      The provider has been notified that the patient has no concerns.     Questions patient would like addressed today are: N/A.    Refills are needed: No    Has homecare services and agency name:  Monserrat Bryan RN  [facility] [place]

## 2025-03-07 ENCOUNTER — ONCOLOGY VISIT (OUTPATIENT)
Dept: TRANSPLANT | Facility: CLINIC | Age: 80
End: 2025-03-07
Attending: STUDENT IN AN ORGANIZED HEALTH CARE EDUCATION/TRAINING PROGRAM
Payer: COMMERCIAL

## 2025-03-07 VITALS
SYSTOLIC BLOOD PRESSURE: 127 MMHG | BODY MASS INDEX: 30.33 KG/M2 | OXYGEN SATURATION: 99 % | WEIGHT: 211.4 LBS | DIASTOLIC BLOOD PRESSURE: 84 MMHG | TEMPERATURE: 98 F | HEART RATE: 70 BPM | RESPIRATION RATE: 16 BRPM

## 2025-03-07 DIAGNOSIS — D47.2 MGUS (MONOCLONAL GAMMOPATHY OF UNKNOWN SIGNIFICANCE): Primary | ICD-10-CM

## 2025-03-07 PROCEDURE — G0463 HOSPITAL OUTPT CLINIC VISIT: HCPCS | Performed by: STUDENT IN AN ORGANIZED HEALTH CARE EDUCATION/TRAINING PROGRAM

## 2025-03-07 ASSESSMENT — PAIN SCALES - GENERAL: PAINLEVEL_OUTOF10: NO PAIN (0)

## 2025-03-07 NOTE — NURSING NOTE
"Oncology Rooming Note    March 7, 2025 10:54 AM   Michael Seals is a 79 year old male who presents for:    Chief Complaint   Patient presents with    Oncology Clinic Visit     MGUS (monoclonal gammopathy of unknown significance)       Initial Vitals: /84 (BP Location: Right arm, Patient Position: Sitting, Cuff Size: Adult Regular)   Pulse 70   Temp 98  F (36.7  C) (Oral)   Resp 16   Wt 95.9 kg (211 lb 6.4 oz)   SpO2 99%   BMI 30.33 kg/m   Estimated body mass index is 30.33 kg/m  as calculated from the following:    Height as of 2/24/25: 1.778 m (5' 10\").    Weight as of this encounter: 95.9 kg (211 lb 6.4 oz). Body surface area is 2.18 meters squared.  No Pain (0) Comment: Data Unavailable   No LMP for male patient.  Allergies reviewed: Yes  Medications reviewed: Yes    Medications: Medication refills not needed today.  Pharmacy name entered into EPIC:    [id number] - [place], [place] - [address] [facility] - [place], [place] - [address]  [facility] - [place], [place] - [address] [facility] [address] [place] - [address]    Frailty Screening:   Is the patient here for a new oncology consult visit in cancer care? 2. No    PHQ9:  Did this patient require a PHQ9?: No      Clinical concerns: Patient states no new concerns to discuss with provider.        Katina Maria, EMT            " English

## 2025-03-07 NOTE — PROGRESS NOTES
Hematology/Oncology Progress Note    Michael Seals is a 79 year old male referred by Dr. Josef Doll for monoclonal gammopathy.    HPI: Michael Seals is a 77 year old male with history of CARD s/p RCA stent 2015 and CKD 3a who was seen by his primary care physician and in the course of evaluation for CKD was noted to have an IgG kappa monoclonal gammopathy.  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    Mr. Seals is seen today for scheduled follow up.  He continues to feel well.  He denies bone pain, night sweats, fevers/chills, unintentional weight loss, or other acute concerns.      ASSESSMENT AND PLAN:  Mr. Seals's peripheral myeloma labs remain stable.  Overall his diagnosis remains consistent with MGUS.  He was referred to nephrology previously for CKD but has not seen them.  I do not think his renal dysfunction is related to myeloma and discussed with him today that he should see nephrology for further evaluation.    There is no role for chemotherapy with MGUS and there are no treatments recommended to prevent progression.  If Mr. Seals were to develop multiple myeloma at a later date he may be a reasonable candidate for palliative chemotherapy despite his age given his good performance status.    We reviewed MGUS again today.  Mr. Seals had a number of appropriate questions that were answered.  I will plan to see him back in one year for lab follow up.    Plan:  - Repeat labs in one year    I spent 20 minutes in the care of this patient today, which included time necessary for preparation for the visit, obtaining history, ordering medications/tests/procedures as medically indicated, review of pertinent medical literature, counseling of the patient, communication of recommendations to the care team, and documentation time.    Abdon Hayden MD    ROS:    10 point ROS neg other than the symptoms noted above in the HPI.        Current Outpatient Medications    Medication Sig Dispense Refill    aspirin (ASA) 81 MG EC tablet Take 81 mg by mouth every other day      Cholecalciferol (VITAMIN D3 PO) Take by mouth daily      evolocumab (REPATHA SURECLICK) 140 MG/ML prefilled autoinjector Inject 1 mL (140 mg) subcutaneously every 14 days Refill when due 6 mL 3    levothyroxine (SYNTHROID/LEVOTHROID) 112 MCG tablet Take 1 tablet (112 mcg) by mouth daily. 90 tablet 3    Multiple Vitamins-Minerals (MULTIVITAMIN PO) Take by mouth daily      nitroGLYcerin (NITROSTAT) 0.4 MG sublingual tablet Place 1 tablet (0.4 mg) under the tongue every 5 minutes as needed for chest pain 30 tablet 3    omega-3 acid ethyl esters (LOVAZA) 1 g capsule Take 2 capsules by mouth twice daily 120 capsule 0    RABEprazole (ACIPHEX) 20 MG EC tablet Take 1 tablet (20 mg) by mouth daily. 90 tablet 3         Physical Exam:     Vital Signs: /84 (BP Location: Right arm, Patient Position: Sitting, Cuff Size: Adult Regular)   Pulse 70   Temp 98  F (36.7  C) (Oral)   Resp 16   Wt 95.9 kg (211 lb 6.4 oz)   SpO2 99%   BMI 30.33 kg/m      ECO  General Appearance: alert, and no distress  Eyes: PERRL, conjunctiva and lids normal, sclera nonicteric  Ears/Nose/M/Throat: Oral mucosa and posterior oropharynx normal, moist mucous membranes  Resp Effort And Auscultation: Breathing comfortably on room air  Musculoskeletal: Musculoskeletal normal  Edema: none  Skin: Skin color, texture, turgor normal. No rashes or lesions.  Neurologic: Gait normal.  Sensation grossly WNL.  Psych/Affect: Mood and affect are appropriate.    Blood Counts       Recent Labs   Lab Test 25  1123 25  1613 24  1107 23  1123 23  0941   HGB 15.3 14.3 15.3   < > 14.1   HCT 43.4 42.2 45.1   < > 41.5   WBC 5.8 6.2 4.4   < > 5.0   ANEUTAUTO 2.5 2.7 1.6   < > 2.7   ALYMPAUTO 2.8 2.8 2.3   < > 1.8   AMONOAUTO 0.4 0.6 0.4   < > 0.3   AEOSAUTO 0.1 0.1 0.0   < > 0.1   ABSBASO 0.0 0.0 0.0   < > 0.0   NRBCMAN  --   --   0.0  --  0.0    201 228   < > 213    < > = values in this interval not displayed.         Chemistries     Basic Panel  Recent Labs   Lab Test 02/28/25  1123 02/18/25  0919 01/02/25  1613    138 140   POTASSIUM 4.8 4.1 4.8   CHLORIDE 104 104 105   CO2 25 26 25   BUN 19.4 15.5 19.0   CR 1.34* 1.27* 1.43*   * 107* 107*        Calcium, Magnesium, Phosphorus  Recent Labs   Lab Test 02/28/25  1123 02/18/25  0919 01/02/25  1613   JIMMY 9.7 9.2 9.4        LFTs  Recent Labs   Lab Test 02/28/25  1123 02/18/25  0919 01/02/25  1613   BILITOTAL 0.5 0.4 0.3   ALKPHOS 88 81 92   AST 32 30 35   ALT 37 33 46   ALBUMIN 4.0 3.8 4.0       LDH  Recent Labs   Lab Test 03/06/23  0941          B2-Microglobulin  Recent Labs   Lab Test 03/06/23  0941   [id number] 3.6*         Immunoglobulins     Recent Labs   Lab Test 02/28/25  1123 03/08/24  1107 09/08/23  1123 03/06/23  0941   IGG 1,931* 1,850* 2,061* 2,177*       Recent Labs   Lab Test 02/28/25 1123 03/08/24 1107 09/08/23  1123 03/06/23  0941   IGA 92 100 96 96       Recent Labs   Lab Test 02/28/25  1123 03/08/24  1107 09/08/23  1123 03/06/23  0941    151 169 161         Monocloncal Protein Studies     M spike    Recent Labs   Lab Test 02/28/25  1123 03/08/24  1107 09/08/23  1123 03/06/23  0941   ELPM 1.3* 1.2* 1.3* 1.2*       [place] FLC    Recent Labs   Lab Test 02/28/25  1123 03/08/24 1107 09/08/23  1123 03/06/23  0941   KFLCA 11.12* 12.32* 13.44* 13.76*       Lambda FLC    Recent Labs   Lab Test 02/28/25  1123 03/08/24  1107 09/08/23  1123 03/06/23  0941   LFLCA 1.64 1.93 1.78 1.94       FLC Ratio    Recent Labs   Lab Test 02/28/25  1123 03/08/24 1107 09/08/23  1123 03/06/23  0941   KLRA 6.78* 6.38* 7.55* 7.09*         Bone Marrow Biopsy     Morphology    No results found for this or any previous visit (from the past 8760 hours).    The longitudinal plan of care for the diagnosis(es)/condition(s) as documented were addressed during this visit. Due to  the added complexity in care, I will continue to support Michael in the subsequent management and with ongoing continuity of care.      ------------------------------------------------------------------------------------------------------------------------------------------------    Patient Care Team         Relationship Specialty Notifications Start End    Josef Doll MD PCP - General Internal Medicine  2/15/12     Phone: [phone] Pager: [phone] Fax: [phone] [address] [place] [address]    Josef Doll MD Assigned PCP   6/7/13     Phone: [phone] Pager: [phone] Fax: [phone] [address] [place] [address]    Micaela Greenwood MD Assigned Heart and Vascular Provider   10/23/20     Phone: [phone] Fax: [phone] [address] [place]                [place] [address]    Del Betancourt MD MD Cardiovascular Disease  2/8/23     Phone: [phone] Fax: [phone] [address] [place] [address]    Abdon Hayden MD MD Hematology & Oncology  2/24/23     Phone: [phone] Fax: [phone]         [address] [place] [address] [address] [facility] [address]

## 2025-03-07 NOTE — LETTER
3/7/2025      Michael Seals  [address]  [facility] [address]      Dear Colleague,    Thank you for referring your patient, Michael Seals, to the [facility] [place]. Please see a copy of my visit note below.         Hematology/Oncology Progress Note    Michael Seals is a 79 year old male referred by Dr. Josef Doll for monoclonal gammopathy.    HPI: Michael Seals is a 77 year old male with history of CARD s/p RCA stent 2015 and CKD 3a who was seen by his primary care physician and in the course of evaluation for CKD was noted to have an IgG kappa monoclonal gammopathy.  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    Mr. Seals is seen today for scheduled follow up.  He continues to feel well.  He denies bone pain, night sweats, fevers/chills, unintentional weight loss, or other acute concerns.      ASSESSMENT AND PLAN:  Mr. Seals's peripheral myeloma labs remain stable.  Overall his diagnosis remains consistent with MGUS.  He was referred to nephrology previously for CKD but has not seen them.  I do not think his renal dysfunction is related to myeloma and discussed with him today that he should see nephrology for further evaluation.    There is no role for chemotherapy with MGUS and there are no treatments recommended to prevent progression.  If Mr. Seals were to develop multiple myeloma at a later date he may be a reasonable candidate for palliative chemotherapy despite his age given his good performance status.    We reviewed MGUS again today.  Mr. Seals had a number of appropriate questions that were answered.  I will plan to see him back in one year for lab follow up.    Plan:  - Repeat labs in one year    I spent 20 minutes in the care of this patient today, which included time necessary for preparation for the visit, obtaining history, ordering medications/tests/procedures as medically indicated, review of pertinent  medical literature, counseling of the patient, communication of recommendations to the care team, and documentation time.    Abdon Hayden MD    ROS:    10 point ROS neg other than the symptoms noted above in the HPI.        Current Outpatient Medications   Medication Sig Dispense Refill     aspirin (ASA) 81 MG EC tablet Take 81 mg by mouth every other day       Cholecalciferol (VITAMIN D3 PO) Take by mouth daily       evolocumab (REPATHA SURECLICK) 140 MG/ML prefilled autoinjector Inject 1 mL (140 mg) subcutaneously every 14 days Refill when due 6 mL 3     levothyroxine (SYNTHROID/LEVOTHROID) 112 MCG tablet Take 1 tablet (112 mcg) by mouth daily. 90 tablet 3     Multiple Vitamins-Minerals (MULTIVITAMIN PO) Take by mouth daily       nitroGLYcerin (NITROSTAT) 0.4 MG sublingual tablet Place 1 tablet (0.4 mg) under the tongue every 5 minutes as needed for chest pain 30 tablet 3     omega-3 acid ethyl esters (LOVAZA) 1 g capsule Take 2 capsules by mouth twice daily 120 capsule 0     RABEprazole (ACIPHEX) 20 MG EC tablet Take 1 tablet (20 mg) by mouth daily. 90 tablet 3         Physical Exam:     Vital Signs: /84 (BP Location: Right arm, Patient Position: Sitting, Cuff Size: Adult Regular)   Pulse 70   Temp 98  F (36.7  C) (Oral)   Resp 16   Wt 95.9 kg (211 lb 6.4 oz)   SpO2 99%   BMI 30.33 kg/m      ECO  General Appearance: alert, and no distress  Eyes: PERRL, conjunctiva and lids normal, sclera nonicteric  Ears/Nose/M/Throat: Oral mucosa and posterior oropharynx normal, moist mucous membranes  Resp Effort And Auscultation: Breathing comfortably on room air  Musculoskeletal: Musculoskeletal normal  Edema: none  Skin: Skin color, texture, turgor normal. No rashes or lesions.  Neurologic: Gait normal.  Sensation grossly WNL.  Psych/Affect: Mood and affect are appropriate.    Blood Counts       Recent Labs   Lab Test 25  1123 25  1613 24  1107 23  1123 23  0941   HGB 15.3  14.3 15.3   < > 14.1   HCT 43.4 42.2 45.1   < > 41.5   WBC 5.8 6.2 4.4   < > 5.0   ANEUTAUTO 2.5 2.7 1.6   < > 2.7   ALYMPAUTO 2.8 2.8 2.3   < > 1.8   AMONOAUTO 0.4 0.6 0.4   < > 0.3   AEOSAUTO 0.1 0.1 0.0   < > 0.1   ABSBASO 0.0 0.0 0.0   < > 0.0   NRBCMAN  --   --  0.0  --  0.0    201 228   < > 213    < > = values in this interval not displayed.         Chemistries     Basic Panel  Recent Labs   Lab Test 02/28/25  1123 02/18/25  0919 01/02/25  1613    138 140   POTASSIUM 4.8 4.1 4.8   CHLORIDE 104 104 105   CO2 25 26 25   BUN 19.4 15.5 19.0   CR 1.34* 1.27* 1.43*   * 107* 107*        Calcium, Magnesium, Phosphorus  Recent Labs   Lab Test 02/28/25  1123 02/18/25  0919 01/02/25  1613   JIMMY 9.7 9.2 9.4        LFTs  Recent Labs   Lab Test 02/28/25  1123 02/18/25  0919 01/02/25  1613   BILITOTAL 0.5 0.4 0.3   ALKPHOS 88 81 92   AST 32 30 35   ALT 37 33 46   ALBUMIN 4.0 3.8 4.0       LDH  Recent Labs   Lab Test 03/06/23  0941          B2-Microglobulin  Recent Labs   Lab Test 03/06/23  0941   [id number] 3.6*         Immunoglobulins     Recent Labs   Lab Test 02/28/25  1123 03/08/24  1107 09/08/23  1123 03/06/23  0941   IGG 1,931* 1,850* 2,061* 2,177*       Recent Labs   Lab Test 02/28/25  1123 03/08/24  1107 09/08/23  1123 03/06/23  0941   IGA 92 100 96 96       Recent Labs   Lab Test 02/28/25  1123 03/08/24  1107 09/08/23  1123 03/06/23  0941    151 169 161         Monocloncal Protein Studies     M spike    Recent Labs   Lab Test 02/28/25  1123 03/08/24  1107 09/08/23  1123 03/06/23  0941   ELPM 1.3* 1.2* 1.3* 1.2*       [place] FLC    Recent Labs   Lab Test 02/28/25  1123 03/08/24  1107 09/08/23  1123 03/06/23  0941   KFLCA 11.12* 12.32* 13.44* 13.76*       Lambda FLC    Recent Labs   Lab Test 02/28/25  1123 03/08/24  1107 09/08/23  1123 03/06/23  0941   LFLCA 1.64 1.93 1.78 1.94       FLC Ratio    Recent Labs   Lab Test 02/28/25  1123 03/08/24  1107 09/08/23  1123 03/06/23  0941   LORENZO  6.78* 6.38* 7.55* 7.09*         Bone Marrow Biopsy     Morphology    No results found for this or any previous visit (from the past 8760 hours).    The longitudinal plan of care for the diagnosis(es)/condition(s) as documented were addressed during this visit. Due to the added complexity in care, I will continue to support Michael in the subsequent management and with ongoing continuity of care.      ------------------------------------------------------------------------------------------------------------------------------------------------    Patient Care Team         Relationship Specialty Notifications Start End    Josef Doll MD PCP - General Internal Medicine  2/15/12     Phone: [phone] Pager: [phone] Fax: [phone] [address] [place] [address]    Josef Doll MD Assigned PCP   6/7/13     Phone: [phone] Pager: [phone] Fax: [phone] [address] [place] [address]    Micaela Greenwood MD Assigned Heart and Vascular Provider   10/23/20     Phone: [phone] Fax: [phone] [address] [place]                [place] [address]    Del Betancourt MD MD Cardiovascular Disease  2/8/23     Phone: [phone] Fax: [phone] [address] [place] [address]    Abdon Hayden MD MD Hematology & Oncology  2/24/23     Phone: [phone] Fax: [phone]         [address] [place] [address] [address] [place] [id number]              Again, thank you for allowing me to participate in the care of your patient.        Sincerely,        Abdon Hayden MD    Electronically signed

## 2025-04-01 DIAGNOSIS — E78.49 FAMILIAL HYPERLIPIDEMIA: ICD-10-CM

## 2025-04-01 NOTE — TELEPHONE ENCOUNTER
[facility]    Who is the name of the provider? Dr Greenwood    What is the location you see this provider at/preferred location? [place]    Person calling / Facility:  Michael    Phone number: [phone]    Nurse call back needed:     Reason for call:  Pt needs a refill on his omega-3 acid ethyl esters (LOVAZA) 1 g capsule . Pt is switching pharmacies to the one listed below:      Pharmacy location: [place] [address] [address]    Outside Imaging: N/A    Can we leave a detailed message on this number? Y    Additional Info:

## 2025-04-02 RX ORDER — OMEGA-3-ACID ETHYL ESTERS 1 G/1
2 CAPSULE, LIQUID FILLED ORAL 2 TIMES DAILY
Qty: 120 CAPSULE | Refills: 11 | Status: SHIPPED | OUTPATIENT
Start: 2025-04-02

## 2025-04-02 NOTE — TELEPHONE ENCOUNTER
Unable to fill per [facility] protocol.  Medication and pharmacy loaded.    Leyla CALVERT, RN    [facility]  Office: [phone]  Fax: [phone]

## 2025-04-04 ENCOUNTER — TELEPHONE (OUTPATIENT)
Dept: OTHER | Facility: CLINIC | Age: 80
End: 2025-04-04
Payer: COMMERCIAL

## 2025-04-04 NOTE — TELEPHONE ENCOUNTER
[facility]  Fax:[phone]  Spec: [phone]  MO: [phone]

## 2025-04-08 NOTE — TELEPHONE ENCOUNTER
Central Prior Authorization Team   Phone: [phone]    PA Initiation    Medication: Repatha sureclick 140mg/ml soaj  Insurance Company: [employer] - Phone [phone] Fax [phone]  Pharmacy Filling the Rx: [facility] - [place], [place] - [facility] [address]  Filling Pharmacy Phone: [phone]  Filling Pharmacy Fax:    Start Date: 4/8/2025

## 2025-04-08 NOTE — TELEPHONE ENCOUNTER
Prior Authorization Approval    Authorization Effective Date: 1/8/2025  Authorization Expiration Date: 4/8/2026  Medication: Repatha sureclick 140mg/ml soaj  Approved Dose/Quantity:   Reference #:     Insurance Company: [employer] - Phone [phone] Fax [phone]  Expected CoPay:       CoPay Card Available:      Foundation Assistance Needed:    Which Pharmacy is filling the prescription (Not needed for infusion/clinic administered): [facility] - [place], [place] - [address]  Pharmacy Notified:  yes  Patient Notified:  yes- Pharmacy will contact patient when ready to /ship

## 2025-06-19 ENCOUNTER — HOSPITAL ENCOUNTER (EMERGENCY)
Facility: CLINIC | Age: 80
Discharge: HOME | End: 2025-06-19
Attending: EMERGENCY MEDICINE
Payer: COMMERCIAL

## 2025-06-19 ENCOUNTER — HOSPITAL ENCOUNTER (INPATIENT)
Facility: CLINIC | Age: 80
Setting detail: SURGERY ADMIT
End: 2025-06-19
Attending: ORTHOPAEDIC SURGERY | Admitting: ORTHOPAEDIC SURGERY
Payer: COMMERCIAL

## 2025-06-19 ENCOUNTER — APPOINTMENT (OUTPATIENT)
Dept: GENERAL RADIOLOGY | Facility: CLINIC | Age: 80
End: 2025-06-19
Attending: PHYSICIAN ASSISTANT
Payer: COMMERCIAL

## 2025-06-19 VITALS
WEIGHT: 208.56 LBS | RESPIRATION RATE: 20 BRPM | DIASTOLIC BLOOD PRESSURE: 92 MMHG | HEART RATE: 80 BPM | SYSTOLIC BLOOD PRESSURE: 114 MMHG | BODY MASS INDEX: 29.2 KG/M2 | TEMPERATURE: 97.8 F | OXYGEN SATURATION: 97 % | HEIGHT: 71 IN

## 2025-06-19 DIAGNOSIS — S66.821A EXTENSOR TENDON LACERATION, HAND, OPEN WOUND, RIGHT, INITIAL ENCOUNTER: ICD-10-CM

## 2025-06-19 DIAGNOSIS — T14.8XXA BLEEDING FROM WOUND: ICD-10-CM

## 2025-06-19 DIAGNOSIS — S61.401A EXTENSOR TENDON LACERATION, HAND, OPEN WOUND, RIGHT, INITIAL ENCOUNTER: ICD-10-CM

## 2025-06-19 DIAGNOSIS — S61.402A EXTENSOR TENDON LACERATION OF LEFT HAND WITH OPEN WOUND, INITIAL ENCOUNTER: Primary | ICD-10-CM

## 2025-06-19 DIAGNOSIS — S66.822A EXTENSOR TENDON LACERATION OF LEFT HAND WITH OPEN WOUND, INITIAL ENCOUNTER: Primary | ICD-10-CM

## 2025-06-19 LAB
ABO + RH BLD: NORMAL
ATRIAL RATE - MUSE: 84 BPM
BASOPHILS # BLD AUTO: 0 10E3/UL (ref 0–0.2)
BASOPHILS NFR BLD AUTO: 0 %
BLD GP AB SCN SERPL QL: NEGATIVE
DIASTOLIC BLOOD PRESSURE - MUSE: NORMAL MMHG
EOSINOPHIL # BLD AUTO: 0 10E3/UL (ref 0–0.7)
EOSINOPHIL NFR BLD AUTO: 1 %
ERYTHROCYTE [DISTWIDTH] IN BLOOD BY AUTOMATED COUNT: 12.6 % (ref 10–15)
HCT VFR BLD AUTO: 40.4 % (ref 40–53)
HGB BLD-MCNC: 14.5 G/DL (ref 13.3–17.7)
HOLD SPECIMEN: NORMAL
IMM GRANULOCYTES # BLD: 0 10E3/UL
IMM GRANULOCYTES NFR BLD: 0 %
INR PPP: 0.97 (ref 0.85–1.15)
INTERPRETATION ECG - MUSE: NORMAL
LYMPHOCYTES # BLD AUTO: 2.8 10E3/UL (ref 0.8–5.3)
LYMPHOCYTES NFR BLD AUTO: 38 %
MCH RBC QN AUTO: 34.7 PG (ref 26.5–33)
MCHC RBC AUTO-ENTMCNC: 35.9 G/DL (ref 31.5–36.5)
MCV RBC AUTO: 97 FL (ref 78–100)
MONOCYTES # BLD AUTO: 0.6 10E3/UL (ref 0–1.3)
MONOCYTES NFR BLD AUTO: 7 %
NEUTROPHILS # BLD AUTO: 4 10E3/UL (ref 1.6–8.3)
NEUTROPHILS NFR BLD AUTO: 54 %
NRBC # BLD AUTO: 0 10E3/UL
NRBC BLD AUTO-RTO: 0 /100
P AXIS - MUSE: 60 DEGREES
PLATELET # BLD AUTO: 244 10E3/UL (ref 150–450)
PR INTERVAL - MUSE: 178 MS
PROTHROMBIN TIME: 13 SECONDS (ref 11.8–14.8)
QRS DURATION - MUSE: 88 MS
QT - MUSE: 386 MS
QTC - MUSE: 456 MS
R AXIS - MUSE: 12 DEGREES
RBC # BLD AUTO: 4.18 10E6/UL (ref 4.4–5.9)
SPECIMEN EXP DATE BLD: NORMAL
SYSTOLIC BLOOD PRESSURE - MUSE: NORMAL MMHG
T AXIS - MUSE: 30 DEGREES
VENTRICULAR RATE- MUSE: 84 BPM
WBC # BLD AUTO: 7.4 10E3/UL (ref 4–11)

## 2025-06-19 PROCEDURE — 86900 BLOOD TYPING SEROLOGIC ABO: CPT | Performed by: EMERGENCY MEDICINE

## 2025-06-19 PROCEDURE — 96365 THER/PROPH/DIAG IV INF INIT: CPT

## 2025-06-19 PROCEDURE — 258N000003 HC RX IP 258 OP 636: Performed by: EMERGENCY MEDICINE

## 2025-06-19 PROCEDURE — 73120 X-RAY EXAM OF HAND: CPT | Mod: RT

## 2025-06-19 PROCEDURE — 96374 THER/PROPH/DIAG INJ IV PUSH: CPT

## 2025-06-19 PROCEDURE — 85004 AUTOMATED DIFF WBC COUNT: CPT | Performed by: EMERGENCY MEDICINE

## 2025-06-19 PROCEDURE — 250N000011 HC RX IP 250 OP 636: Performed by: EMERGENCY MEDICINE

## 2025-06-19 PROCEDURE — 96375 TX/PRO/DX INJ NEW DRUG ADDON: CPT | Mod: 59

## 2025-06-19 PROCEDURE — 35207 RPR BLD VSL DIR HAND FINGER: CPT | Mod: RT | Performed by: SURGERY

## 2025-06-19 PROCEDURE — 99291 CRITICAL CARE FIRST HOUR: CPT | Mod: 25

## 2025-06-19 PROCEDURE — 85610 PROTHROMBIN TIME: CPT | Performed by: EMERGENCY MEDICINE

## 2025-06-19 PROCEDURE — 93005 ELECTROCARDIOGRAM TRACING: CPT

## 2025-06-19 PROCEDURE — 36415 COLL VENOUS BLD VENIPUNCTURE: CPT | Performed by: EMERGENCY MEDICINE

## 2025-06-19 PROCEDURE — 450N000001

## 2025-06-19 PROCEDURE — 26418 REPAIR FINGER TENDON: CPT

## 2025-06-19 PROCEDURE — 96361 HYDRATE IV INFUSION ADD-ON: CPT

## 2025-06-19 PROCEDURE — 99285 EMERGENCY DEPT VISIT HI MDM: CPT | Mod: 25

## 2025-06-19 PROCEDURE — 99203 OFFICE O/P NEW LOW 30 MIN: CPT | Mod: 57 | Performed by: SURGERY

## 2025-06-19 RX ORDER — TRANEXAMIC ACID 650 MG/1
1950 TABLET ORAL ONCE
Status: CANCELLED | OUTPATIENT
Start: 2025-06-19 | End: 2025-06-19

## 2025-06-19 RX ORDER — ONDANSETRON 2 MG/ML
INJECTION INTRAMUSCULAR; INTRAVENOUS
Status: COMPLETED
Start: 2025-06-19 | End: 2025-06-19

## 2025-06-19 RX ORDER — CEPHALEXIN 500 MG/1
500 CAPSULE ORAL 4 TIMES DAILY
Qty: 28 CAPSULE | Refills: 0 | Status: SHIPPED | OUTPATIENT
Start: 2025-06-19 | End: 2025-06-26

## 2025-06-19 RX ORDER — ONDANSETRON 2 MG/ML
4 INJECTION INTRAMUSCULAR; INTRAVENOUS ONCE
Status: COMPLETED | OUTPATIENT
Start: 2025-06-19 | End: 2025-06-19

## 2025-06-19 RX ORDER — CEFAZOLIN SODIUM 2 G/50ML
2 SOLUTION INTRAVENOUS SEE ADMIN INSTRUCTIONS
Status: CANCELLED | OUTPATIENT
Start: 2025-06-19

## 2025-06-19 RX ORDER — LIDOCAINE HYDROCHLORIDE 10 MG/ML
10 INJECTION, SOLUTION EPIDURAL; INFILTRATION; INTRACAUDAL; PERINEURAL ONCE
Status: DISCONTINUED | OUTPATIENT
Start: 2025-06-19 | End: 2025-06-19 | Stop reason: HOSPADM

## 2025-06-19 RX ORDER — CEFAZOLIN SODIUM 2 G/50ML
SOLUTION INTRAVENOUS
Status: COMPLETED
Start: 2025-06-19 | End: 2025-06-19

## 2025-06-19 RX ORDER — LIDOCAINE HYDROCHLORIDE AND EPINEPHRINE 10; 10 MG/ML; UG/ML
INJECTION, SOLUTION INFILTRATION; PERINEURAL
Status: COMPLETED
Start: 2025-06-19 | End: 2025-06-19

## 2025-06-19 RX ORDER — CEFAZOLIN SODIUM 2 G/50ML
2 SOLUTION INTRAVENOUS
Status: CANCELLED | OUTPATIENT
Start: 2025-06-19

## 2025-06-19 RX ORDER — HYDROMORPHONE HCL IN WATER/PF 6 MG/30 ML
0.2 PATIENT CONTROLLED ANALGESIA SYRINGE INTRAVENOUS ONCE
Status: COMPLETED | OUTPATIENT
Start: 2025-06-19 | End: 2025-06-19

## 2025-06-19 RX ADMIN — LIDOCAINE HYDROCHLORIDE,EPINEPHRINE BITARTRATE: 10; .01 INJECTION, SOLUTION INFILTRATION; PERINEURAL at 16:12

## 2025-06-19 RX ADMIN — CEFAZOLIN SODIUM 2 G: 2 SOLUTION INTRAVENOUS at 16:09

## 2025-06-19 RX ADMIN — SODIUM CHLORIDE 1000 ML: 0.9 INJECTION, SOLUTION INTRAVENOUS at 16:06

## 2025-06-19 RX ADMIN — ONDANSETRON 4 MG: 2 INJECTION INTRAMUSCULAR; INTRAVENOUS at 16:06

## 2025-06-19 RX ADMIN — HYDROMORPHONE HYDROCHLORIDE 0.2 MG: 0.2 INJECTION, SOLUTION INTRAMUSCULAR; INTRAVENOUS; SUBCUTANEOUS at 15:56

## 2025-06-19 RX ADMIN — ONDANSETRON 4 MG: 2 INJECTION, SOLUTION INTRAMUSCULAR; INTRAVENOUS at 16:06

## 2025-06-19 ASSESSMENT — COLUMBIA-SUICIDE SEVERITY RATING SCALE - C-SSRS
1. IN THE PAST MONTH, HAVE YOU WISHED YOU WERE DEAD OR WISHED YOU COULD GO TO SLEEP AND NOT WAKE UP?: NO
2. HAVE YOU ACTUALLY HAD ANY THOUGHTS OF KILLING YOURSELF IN THE PAST MONTH?: NO
6. HAVE YOU EVER DONE ANYTHING, STARTED TO DO ANYTHING, OR PREPARED TO DO ANYTHING TO END YOUR LIFE?: NO

## 2025-06-19 ASSESSMENT — ACTIVITIES OF DAILY LIVING (ADL)
ADLS_ACUITY_SCORE: 41
ADLS_ACUITY_SCORE: 41

## 2025-06-19 NOTE — PROGRESS NOTES
SPIRITUAL HEALTH SERVICES - Progress Note  [facility] ED    Referral Source: Overhead page trauma CODE    Supported patient's spouse, Nisha, as patient received cares.     Patient's spouse drove patient to the hospital after he cut his hand with a saw.      Oriented patient's spouse to [facility]. [facility] remains available upon request.     GOVIND Perez   Intern    [facility] available 24/7 for emergent requests/referrals, either by paging the on-call  or by entering an ASAP/STAT consult in Epic (this will also page the on-call ).

## 2025-06-19 NOTE — ED TRIAGE NOTES
Pt arrives for chainsaw injury to the L hand/wrist, was wearing glove which is still in place. Takes baby aspirin daily. Ambulatory, bleeding controlled with pressure dressing. VSS.

## 2025-06-19 NOTE — PROCEDURES
Orthopedic surgery procedure note    Preoperative diagnosis:  8 cm complex laceration thenar eminence right hand    Postoperative diagnosis:  #1 8 cm complex laceration thenar eminence right hand  #2 laceration extensor pollicis longus tendon right hand    Procedure:  #1 wound exploration 8 cm complex laceration thenar eminence right hand  #2 primary repair EPL tendon right hand  #3 wound closure 8 cm complex laceration thenar eminence right hand    Surgeon:  Uche Lopez MD     Co-surgeon:  MD Dr. Cristal Miles responded to a trauma activation for complex laceration right hand.  She proceeded with a field block and venous ligation for hemostasis prior to my arrival    Anesthesia:  Local    Estimated blood loss:  20 cc    Complications:  None readily apparent    Indication for procedure:  Michael Seals is a very pleasant 79-year-old right-hand-dominant male who sustained an injury to the dorsal aspect of the thenar eminence of his right hand earlier today while using a chop saw.  He had immediate bleeding and EMS was contacted.  A tourniquet was placed in the field.  He presented to [facility] emergency room as a trauma activation.  As above, Dr. Bee responded to the trauma activation and proceeded with initial wound exploration, field block and hemostasis.  I arrived to the emergency room shortly thereafter and was able to discuss the risk benefits and alternatives to wound exploration with the patient and his wife at bedside.    Description of procedure:  The patient was met in the emergency room by myself.  Informed consent was implied on an emergent basis.  This was also discussed verbally with the patient and his wife as I proceeded with exploration of the complex laceration to the thenar eminence and repair of the EPL tendon.    There was an 8 cm laceration over the dorsal aspect of the base of the thenar eminence.  This was thoroughly irrigated and a field block had been applied prior to my arrival.   Several small veins were ligated previously.  The thumb demonstrated brisk capillary refill and was well-perfused.  Sensory examination was not possible given the field block prior.  At this point, we proceeded with exploration of the wound.  This had been thoroughly irrigated prior.  There were several small Vicryl sutures on venous bleeders.  Inspection of the wound demonstrated an acute laceration of the EPL tendon.  The patient was conscious and was not able to extend the thumb.  As such, the wound was explored and I was able to identify the ends of the EPL tendon.  At this point we proceeded with primary repair    The EPL tendon was identified both proximally and distally.  Again, the patient was conscious and neurovascular examination was performed.  The EPL was out.  The tendon edge was readily retractable when the patient attempted to fire his EPL.  As such, this was repaired primarily to the distal EPL stump with 0 Ethibond.  A core suture was placed and this nicely approximated the tendon.  This was oversewn with 2 figure-of-eight sutures with of the 0 Ethibond.  The patient again was asked to fire the EPL and this demonstrated extension of the thumb.  At this point we proceeded with wound closure    The wound was again irrigated.  The skin was approximated with 3-0 nylon suture.  11 sutures were placed.  After this, a sterile dressing was placed followed by well-padded thumb spica splint.  Capillary refill was checked after the repair and this was brisk.  The patient was given antibiotics in the emergency room.  Tetanus vaccination was up-to-date.    Postoperative plan:  Nehls may discharge later today pending pain control monitoring.  Should receive oral antibiotics for 10 to 14 days.  Follow-up in 2 weeks in the [facility] office    Uche Lopez MD

## 2025-06-19 NOTE — ED PROVIDER NOTES
Emergency Department Note      History of Present Illness     Chief Complaint   Laceration      HPI   Michael Seals is a 79 year old male who presents to the ER for evaluation of right hand laceration/Injury.  Prior to Arrival, Patient Was Using a Chop Saw When His Hand Slipped Causing Injury to the Dorsal Radial Right Hand overlying the first metatarsal.  He denies blood thinner use.  He denies significant blood loss or pulsatile blood flow from the wound.  He drove here by private vehicle with his wife.  Last tetanus shot was 2022.  No blood thinner use.  No history of CHF.  No other injuries.    Independent Historian   None    Review of External Notes       Past Medical History     Medical History and Problem List   Past Medical History:   Diagnosis Date    CAD (coronary artery disease)     CKD (chronic kidney disease) stage 2, GFR 60-89 ml/min     Esophageal dysmotility     GERD (gastroesophageal reflux disease)     H pylori ulcer     Hip fracture (H) 01/2013    HTN (hypertension)     Hyperlipidaemia LDL goal <100     Moderate major depression (H)     S/P appendectomy     S/P hernia repair     S/P PTCA (percutaneous transluminal coronary angioplasty) 03/2012       Medications   cephALEXin (KEFLEX) 500 MG capsule  aspirin (ASA) 81 MG EC tablet  Cholecalciferol (VITAMIN D3 PO)  evolocumab (REPATHA SURECLICK) 140 MG/ML prefilled autoinjector  levothyroxine (SYNTHROID/LEVOTHROID) 112 MCG tablet  Multiple Vitamins-Minerals (MULTIVITAMIN PO)  nitroGLYcerin (NITROSTAT) 0.4 MG sublingual tablet  omega-3 acid ethyl esters (LOVAZA) 1 g capsule  RABEprazole (ACIPHEX) 20 MG EC tablet        Surgical History   Past Surgical History:   Procedure Laterality Date    APPENDECTOMY      CORONARY ANGIOGRAPHY ADULT ORDER  8/2015    previously placed RCA stent patent, 70% mid LAD stenosis, 50% prox CFX, EF 55-60%    HEART CATH STENT COR W/WO PTCA  3/2012    LILLIAN to RCA     HERNIA REPAIR         Physical Exam     Patient Vitals for  "the past 24 hrs:   BP Temp Temp src Pulse Resp SpO2 Height Weight   06/19/25 1740 (!) 114/92 -- -- 80 -- 97 % -- --   06/19/25 1730 136/85 -- -- 82 -- 99 % -- --   06/19/25 1724 -- -- -- 88 -- 98 % -- --   06/19/25 1720 127/68 -- -- -- -- -- -- --   06/19/25 1719 -- -- -- 82 -- 97 % -- --   06/19/25 1715 -- -- -- 84 -- 98 % -- --   06/19/25 1709 -- -- -- 86 -- 97 % -- --   06/19/25 1705 -- -- -- 88 -- 97 % -- --   06/19/25 1700 -- -- -- 86 -- -- -- --   06/19/25 1700 102/71 -- -- 85 -- 96 % -- --   06/19/25 1655 -- -- -- 77 -- 96 % -- --   06/19/25 1650 100/71 -- -- -- -- -- -- --   06/19/25 1649 -- -- -- 81 -- 95 % -- --   06/19/25 1645 -- -- -- 83 -- 96 % -- --   06/19/25 1640 -- -- -- 86 -- -- -- --   06/19/25 1640 118/83 -- -- 87 -- 97 % -- --   06/19/25 1635 -- -- -- 82 -- 98 % -- --   06/19/25 1630 -- -- -- 81 -- -- -- --   06/19/25 1630 121/77 -- -- 78 -- 95 % -- --   06/19/25 1625 -- -- -- 75 -- -- -- --   06/19/25 1625 134/78 -- -- 77 -- 96 % -- --   06/19/25 1620 -- -- -- 80 -- -- -- --   06/19/25 1620 127/72 -- -- 80 -- 97 % -- --   06/19/25 1615 -- -- -- 77 -- -- -- --   06/19/25 1615 101/72 -- -- 78 -- 97 % -- --   06/19/25 1610 -- -- -- 81 -- -- -- --   06/19/25 1610 121/69 -- -- 84 -- 97 % -- --   06/19/25 1605 -- -- -- 78 -- -- -- --   06/19/25 1605 119/80 -- -- 78 -- 97 % -- --   06/19/25 1600 130/64 -- -- (!) 43 -- 97 % -- --   06/19/25 1555 (!) 164/125 -- -- -- -- -- -- --   06/19/25 1555 -- -- -- (!) 47 -- 98 % -- --   06/19/25 1550 135/89 -- -- (!) 44 -- 95 % -- --   06/19/25 1546 (!) 136/96 97.8  F (36.6  C) Oral 91 20 93 % 1.803 m (5' 11\") 94.6 kg (208 lb 8.9 oz)     Physical Exam  VS: Reviewed per above  HENT: Mucous membranes moist  EYES: sclera anicteric  CV: Rate as noted,  regular rhythm.   RESP: Effort normal. Breath sounds are normal bilaterally.  NEURO: Alert, moving all extremities, Limited sensory testing to right distal hand. Able to move R hand fingers when asked. Overall RUE neuro " exam limited due to tourniquet and hand laceration  MSK: No deformity of the extremities  SKIN: Warm and dry, large vertical laceration extending along the dorsal first metacarpal region with evidence of venous oozing and arteriolar bleeding as well as tendon injury.          Diagnostics     Lab Results   Labs Ordered and Resulted from Time of ED Arrival to Time of ED Departure   CBC WITH PLATELETS AND DIFFERENTIAL - Abnormal       Result Value    WBC Count 7.4      RBC Count 4.18 (*)     Hemoglobin 14.5      Hematocrit 40.4      MCV 97      MCH 34.7 (*)     MCHC 35.9      RDW 12.6      Platelet Count 244      % Neutrophils 54      % Lymphocytes 38      % Monocytes 7      % Eosinophils 1      % Basophils 0      % Immature Granulocytes 0      NRBCs per 100 WBC 0      Absolute Neutrophils 4.0      Absolute Lymphocytes 2.8      Absolute Monocytes 0.6      Absolute Eosinophils 0.0      Absolute Basophils 0.0      Absolute Immature Granulocytes 0.0      Absolute NRBCs 0.0     INR - Normal    INR 0.97      PT 13.0     TYPE AND SCREEN, ADULT    ABO/RH(D) O NEG      Antibody Screen Negative      SPECIMEN EXPIRATION DATE 6/22/2025 11:59:00 PM CDT     ABO/RH TYPE AND SCREEN       Imaging   XR Hand Port Right 2 Views   Final Result   IMPRESSION: 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 No definite fracture or radiopaque foreign body.           EKG   ECG results from 06/19/25   EKG 12 lead     Value    Systolic Blood Pressure     Diastolic Blood Pressure     Ventricular Rate 84    Atrial Rate 84    ND Interval 178    QRS Duration 88        QTc 456    P Axis 60    R AXIS 12    T Axis 30    Interpretation ECG      Sinus rhythm with frequent Premature ventricular complexes in a pattern of bigeminy  Otherwise normal ECG  When compared with ECG of 24-Aug-2015 09:43,  Premature ventricular complexes are now Present  Vent. rate has increased by  39  bpm  QT has lengthened         Independent Interpretation   None    ED Course      Medications Administered   Medications   lidocaine (PF) (XYLOCAINE) 1 % injection 10 mL (has no administration in time range)   lidocaine (PF) (XYLOCAINE) 1 % injection 10 mL (has no administration in time range)   HYDROmorphone (DILAUDID) injection 0.2 mg (0.2 mg Intravenous $Given 6/19/25 1556)   ceFAZolin (ANCEF) in dextrose 3% (50 mL) in dextrose 50 mL intermittent infusion (0 g  Stopped 6/19/25 1632)   ondansetron (ZOFRAN) injection 4 mg (4 mg Intravenous $Given 6/19/25 1606)   lidocaine 1% with EPINEPHrine 1:100,000 1 %-1:293435 injection (  $Given 6/19/25 1612)   sodium chloride 0.9% BOLUS 1,000 mL (0 mLs Intravenous Stopped 6/19/25 1734)       Procedures   Procedures     Discussion of Management   ED Course as of 06/19/25 1748   Thu Jun 19, 2025   1620 I have been in the room at the bedside since patient arrival. Tourniquet placed for arteriolar bleeding vessels in the R hand. Full trauma activated due to tourniquet. Dr Bee general surgery as well at the bedside.  I irrgated wound copiously with saline and  instilled 10cc lidocaine 1% with epi surrounding the wound edges. Ortho hand involved- Dr Lopez en route for definitive repair.    1633 I went to bedside again. Tourniquet was let down after Dr Bee placed sutures around various lacerated vascular structures. No ongoing pulsatile bleeding. Will continue to monitor while awaiting Dr Lopez's arrival. Pt awake and alert and in good spirits and HDS   1643 Dr Lopez, orthopedics at the bedside. I provided brief history.       Additional Documentation      Medical Decision Making / Diagnosis     CMS Diagnoses: IV Antibiotics given and/or elevated Lactate of 0 and no sepsis note found - Delete this reminder and enter the sepsis note or '.edcms' before signing chart.>>>None    MIPS   None       MDM   Michael NADYA Seals is a 79 year old male who presents to the ER for evaluation of  right hand laceration and bleeding secondary to Injury.  Vital Signs Reassuring.  On Exam There Is Evidence of Venous and Arteriolar Bleeding from the Wound with Tendon Injury.  Patient Did Seem to Have Good Movement of the Right Hand Fingers.  Assessment of Sensation Was Limited Due To Pain and Bleeding Wound.  Pressure Alone Was Not Sufficient to Stop the Bleeding and Thus a Tourniquet Was Applied to the Right Forearm.  A Subsequent Full Trauma Activation Was Initiated.  General Surgery Came to the Bedside and Help Facilitate Involvement of Orthopedic Hand Surgery Who Came to the ER for Further Evaluation.  Fortunately right upper extremity tourniquet was intermittently released while awaiting for orthopedics to arrive.  Hemoglobin returned stable.  After general surgery placed some sutures overlying a few lacerated vascular structures, tourniquet was no longer necessary.  Orthopedic surgery performed definitive repair and splinting at the bedside.  They recommended course of Keflex and follow-up in their clinic in 1 to 2 weeks.  Strict return precaution discussed prior to discharge.    Disposition   The patient was discharged.     Diagnosis     ICD-10-CM    1. Extensor tendon laceration of left hand with open wound, initial encounter  S66.822A CANCELED: Case Request: REPAIR, TENDON, HAND    S61.402A CANCELED: Case Request: REPAIR, TENDON, HAND      2. Extensor tendon laceration, hand, open wound, right, initial encounter  S66.821A     S61.401A       3. Bleeding from wound  T14.8XXA     R dorsal/radial hand laceration arteriolar bleeding           Discharge Medications   Discharge Medication List as of 6/19/2025  5:34 PM        START taking these medications    Details   cephALEXin (KEFLEX) 500 MG capsule Take 1 capsule (500 mg) by mouth 4 times daily for 7 days., Disp-28 capsule, R-0, E-Prescribe                      Chris Singh MD  06/19/25 6152

## 2025-06-19 NOTE — CONSULTS
ORTHOPAEDIC SURGERY CONSULTATION NOTE    CONSULTING PROVIDER:  Uche Lopez MD    HISTORY OF PRESENT ILLNESS  Michael Seals is a 79 year old RHD male who presents for evaluation of complex laceration thenar eminence right hand. Was using a table saw earlier today and lacerated the right hand. Had immediate pain and bleeding. I was consulted emergently to the ER after a trauma activation. Dr Bee had responded and was able to obtain hemostasis after a tourniquet was let down. Ortho service consulted for complex laceration to the right thenar eminence.     MEDICAL HISTORY  Past Medical History:   Diagnosis Date    CAD (coronary artery disease)     3/2012 L[facility]IAN RCA, 8/2015 Heart cath - previously placed RCA stent patent, 70% mid LAD stenosis, 50% prox CFX, EF 55-60%    CKD (chronic kidney disease) stage 2, GFR 60-89 ml/min     baseline crt 1.20 - 1.60    Esophageal dysmotility     GERD (gastroesophageal reflux disease)     H pylori ulcer     Hip fracture (H) 01/2013    right side - closed fracture - no surgery    HTN (hypertension)     Hyperlipidaemia LDL goal <100     Moderate major depression (H)     S/P appendectomy     S/P hernia repair     left    S/P PTCA (percutaneous transluminal coronary angioplasty) 03/2012    LILLIAN RCA       SURGICAL HISTORY  Past Surgical History:   Procedure Laterality Date    APPENDECTOMY      CORONARY ANGIOGRAPHY ADULT ORDER  8/2015    previously placed RCA stent patent, 70% mid LAD stenosis, 50% prox CFX, EF 55-60%    HEART CATH STENT COR W/WO PTCA  3/2012    L[facility]IAN to RCA     HERNIA REPAIR         CURRENT MEDICATIONS    Current Facility-Administered Medications:     lidocaine (PF) (XYLOCAINE) 1 % injection 10 mL, 10 mL, Intradermal, Once, Uche Lopez MD    lidocaine (PF) (XYLOCAINE) 1 % injection 10 mL, 10 mL, Intradermal, Once, Chris Singh MD    Current Outpatient Medications:     cephALEXin (KEFLEX) 500 MG capsule, Take 1 capsule (500 mg) by mouth 4 times daily for 7 days.,  Disp: 28 capsule, Rfl: 0    aspirin (ASA) 81 MG EC tablet, Take 81 mg by mouth every other day, Disp: , Rfl:     Cholecalciferol (VITAMIN D3 PO), Take by mouth daily, Disp: , Rfl:     evolocumab (REPATHA SURECLICK) 140 MG/ML prefilled autoinjector, Inject 1 mL (140 mg) subcutaneously every 14 days Refill when due, Disp: 6 mL, Rfl: 3    levothyroxine (SYNTHROID/LEVOTHROID) 112 MCG tablet, Take 1 tablet (112 mcg) by mouth daily., Disp: 90 tablet, Rfl: 3    Multiple Vitamins-Minerals (MULTIVITAMIN PO), Take by mouth daily, Disp: , Rfl:     nitroGLYcerin (NITROSTAT) 0.4 MG sublingual tablet, Place 1 tablet (0.4 mg) under the tongue every 5 minutes as needed for chest pain, Disp: 30 tablet, Rfl: 3    omega-3 acid ethyl esters (LOVAZA) 1 g capsule, Take 2 capsules by mouth 2 times daily., Disp: 120 capsule, Rfl: 11    RABEprazole (ACIPHEX) 20 MG EC tablet, Take 1 tablet (20 mg) by mouth daily., Disp: 90 tablet, Rfl: 3    SOCIAL HISTORY    Tobacco history is   History   Smoking Status    Former    Types: Cigarettes   Smokeless Tobacco    Never   .    REVIEW OF SYSTEMS  Constitutional: Negative for chills, fever, nausea, vomiting.  Objective   VITAL SIGNS  BP (!) 114/92 (06/19/25 1740) Temp    Pulse   Resp    SpO2 97 % (06/19/25 1740)  Body mass index is 29.09 kg/m .    PHYSICAL EXAMINATION  Orthopedic Physical Examination_  General Appearance: Patient appears active, comfortable, no acute distress.  NEURO: The patient is alert and oriented to person, place, and time and able to follow commands.  HEENT: Head normocephalic, atraumatic.  CVS: No cyanosis or obvious jugular venous distension .  Resp: No acute respiratory distress or increased inspiratory effort.  Musculoskeletal:  Right upper extremity:  Skin clean, dry and intact.  8 cm Laceration dorsal aspect right thumb   Felid block in place limiting sensory exam  Neurovascular exam:   EPL out  Intact flexor pollicis longus, Extensor digitorum comminus, Flexor digitorum  profundus, Flexor digitorum superficialis, intrinsic muscles of the hand.  Sensation intact to light touch fingers. Thumb limited by field blockade  Palpable radial pulse.    DIAGNOSTICS  IMAGING:  R hand films reviewed independently. No acute fractures    Assessment & Plan   79 year old RHD male with complex laceration R thumb. EPL laceration    PLAN:  I discussed with Michael treatment options for his hand injury. A complex laceration repair was completed in the ER. A separate procedure note will be dictated. All patient questions were answered to the best of my ability at this visit. Thank you for consultation of this very pleasant patient    ADMINISTRATIVE/BILLIN minutes were spent in care of this patient greater than 30 of which were spent in counseling, imaging review and coordination of care

## 2025-06-19 NOTE — CONSULTS
[facility]   Trauma Surgical Consultation           Assessment and Plan:   Assessment:   Michael Seals is a 79 year old male who presents after sustaining a laceration to the posterior aspect of his right wrist/thumb using a power saw.  There is an obvious extensor tendon injury.  He is able to move all of his fingers and has normal sensation.  On arrival, the wound was bleeding profusely and a tourniquet was applied, however with serial ligation of multiple small veins within the wound, the tourniquet was able to be removed prior to arrival of the orthopedic surgeon.       Plan:   - Hemostasis of laceration achieved with multiple ligating sutures, see separate procedure note  - Further cares as per orthopedics  - Trauma surgery will sign off    Katherine Bee MD          Chief Complaint:   Laceration from power saw     History is obtained from the patient.         History of Present Illness:   Michael Seals is a 79 year old male who presented to the ED by private vehicle after sustaining a deep laceration from a power saw.  He had been chopping wood when his hand got pulled into the saw.  He has been able to feel and move all of his fingers, but has had profuse bleeding from the laceration.  On arrival to the ED he was continuing to bleed despite manual pressure, so a tourniquet was applied.  He denies any other injuries or pain in any other location.           Past Medical History:    has a past medical history of CAD (coronary artery disease), CKD (chronic kidney disease) stage 2, GFR 60-89 ml/min, Esophageal dysmotility, GERD (gastroesophageal reflux disease), H pylori ulcer, Hip fracture (H) (01/2013), HTN (hypertension), Hyperlipidaemia LDL goal <100, Moderate major depression (H), S/P appendectomy, S/P hernia repair, and S/P PTCA (percutaneous transluminal coronary angioplasty) (03/2012).          Past Surgical History:     Past Surgical History:   Procedure Laterality Date    APPENDECTOMY       CORONARY ANGIOGRAPHY ADULT ORDER  2015    previously placed RCA stent patent, 70% mid LAD stenosis, 50% prox CFX, EF 55-60%    HEART CATH STENT COR W/WO PTCA  3/2012    LILLIAN to RCA     HERNIA REPAIR              Social History:     Social History     Tobacco Use    Smoking status: Former     Current packs/day: 0.00     Average packs/day: 1 pack/day for 8.0 years (8.0 ttl pk-yrs)     Types: Cigarettes     Start date:      Quit date: 1970     Years since quittin.5    Smokeless tobacco: Never   Substance Use Topics    Alcohol use: Yes     Alcohol/week: 10.0 standard drinks of alcohol     Types: 10 Standard drinks or equivalent per week     Comment: 1-2 glasses of wine per evening with dinner             Family History:   Family history reviewed and not pertinent.         Allergies:     Allergies   Allergen Reactions    Atorvastatin      Other Reaction(s): Muscle Weakness    Pravastatin      Other Reaction(s): Muscle Weakness    Rosuvastatin Muscle Pain (Myalgia)     Muscle aches    Simvastatin Muscle Pain (Myalgia)    Zetia [Ezetimibe]      Myalgias               Medications:     Current Facility-Administered Medications   Medication Dose Route Frequency Provider Last Rate Last Admin    lidocaine (PF) (XYLOCAINE) 1 % injection 10 mL  10 mL Intradermal Once Uche Lopez MD        lidocaine (PF) (XYLOCAINE) 1 % injection 10 mL  10 mL Intradermal Once Chris Singh MD        sodium chloride 0.9% BOLUS 1,000 mL  1,000 mL Intravenous Once Chris Singh MD 1,000 mL/hr at 25 1606 1,000 mL at 25 1606     Current Outpatient Medications   Medication Sig Dispense Refill    aspirin (ASA) 81 MG EC tablet Take 81 mg by mouth every other day      Cholecalciferol (VITAMIN D3 PO) Take by mouth daily      evolocumab (REPATHA SURECLICK) 140 MG/ML prefilled autoinjector Inject 1 mL (140 mg) subcutaneously every 14 days Refill when due 6 mL 3    levothyroxine (SYNTHROID/LEVOTHROID) 112 MCG tablet  "Take 1 tablet (112 mcg) by mouth daily. 90 tablet 3    Multiple Vitamins-Minerals (MULTIVITAMIN PO) Take by mouth daily      nitroGLYcerin (NITROSTAT) 0.4 MG sublingual tablet Place 1 tablet (0.4 mg) under the tongue every 5 minutes as needed for chest pain 30 tablet 3    omega-3 acid ethyl esters (LOVAZA) 1 g capsule Take 2 capsules by mouth 2 times daily. 120 capsule 11    RABEprazole (ACIPHEX) 20 MG EC tablet Take 1 tablet (20 mg) by mouth daily. 90 tablet 3     Current Facility-Administered Medications   Medication Dose Route Frequency Provider Last Rate Last Admin    lidocaine (PF) (XYLOCAINE) 1 % injection 10 mL  10 mL Intradermal Once Uche Lopez MD        lidocaine (PF) (XYLOCAINE) 1 % injection 10 mL  10 mL Intradermal Once Chris Singh MD        sodium chloride 0.9% BOLUS 1,000 mL  1,000 mL Intravenous Once Chris Singh MD 1,000 mL/hr at 06/19/25 1606 1,000 mL at 06/19/25 1606            Review of Systems:   The 10 point review of systems is negative other than noted in the HPI.           Physical Exam:   /83   Pulse 87   Temp 97.8  F (36.6  C) (Oral)   Resp 20   Ht 1.803 m (5' 11\")   Wt 94.6 kg (208 lb 8.9 oz)   SpO2 97%   BMI 29.09 kg/m    General appearance: well-nourished, no apparent distress  Extremities:~ 10 cm laceration on the dorsal aspect of the right wrist/thumb.  Visible extensor tendon.  Skin appears viable. When tourniquet is down, sensation and range of motion are intact aside from extension of the distal thumb.      Neurologic: nonfocal, grossly intact times four extremities, alert and oriented times three.  Cranial nerves II through XII intact grossly.  Psychiatric: mood and affect are appropriate.         Data:   WBC -   WBC   Date Value Ref Range Status   12/31/2020 5.4 4.0 - 11.0 10e9/L Final     WBC Count   Date Value Ref Range Status   06/19/2025 7.4 4.0 - 11.0 10e3/uL Final   ], HgB -   Hemoglobin   Date Value Ref Range Status   06/19/2025 14.5 13.3 " - 17.7 g/dL Final   12/31/2020 13.7 13.3 - 17.7 g/dL Final   ]   Liver Function Studies -   Recent Labs   Lab Test 02/28/25  1123   PROTTOTAL 8.1   ALBUMIN 4.0   BILITOTAL 0.5   ALKPHOS 88   AST 32   ALT 37     IMAGING:  Results for orders placed or performed during the hospital encounter of 06/19/25   XR Hand Port Right 2 Views    Narrative    EXAM: XR HAND PORT RIGHT 2 VIEWS  LOCATION: [facility]  DATE: 6/19/2025    INDICATION: chainsaw injury  COMPARISON: None.      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 No definite fracture or radiopaque foreign body.        This note was created using voice recognition software. Undetected word substitutions or other errors may have occurred.     Katherine Bee MD    Time spent with the patient, reviewing the EMR, reviewing laboratory and imaging studies, performing procedure, more than 50% of which was counseling and coordinating care:  45 minutes.

## 2025-06-19 NOTE — DISCHARGE INSTRUCTIONS
You came to the ER after right hand wound.  The orthopedic surgeon, Dr. Lopez, repaired one of the tendons in your hands and the laceration itself.  He would like you to keep the splint clean and dry and take the antibiotics that were prescribed to you to prevent infection of the wound.  Follow-up in his clinic in 1 to 2 weeks time.  Return for signs of bleeding through the dressing or fevers or uncontrolled pain.

## 2025-06-19 NOTE — PROCEDURES
[facility]   Operative Note    Pre-operative diagnosis: Extensor tendon laceration of right hand with open wound, initial encounter [M24.678Q, V35.402B]   Post-operative diagnosis Same   Procedure: Superficial vein ligation x 6   Surgeon(s): Katherine Bee MD   Estimated blood loss: 20 ml    Specimens: None   Findings: Laceration of dorsal aspect of the right wrist/thumb.  A lacerated tendon is present within the wound. Several small points of active bleeding were ligated prior to arrival of the orthopedist.      Indication for procedure:  This is an 79-year-old male who was using a power saw to cut wood and sustained a laceration to the dorsal aspect of his right wrist and thumb.  He arrived to the emergency department by private vehicle and was noted to have profuse bleeding from his laceration suspected arterial injury. A tourniquet had been applied.     Description of procedure:   Upon my arrival, the wound had already been thoroughly irrigated with saline and a tourniquet was applied.  Lidocaine with epinephrine had been administered by the emergency department physician, Dr. Singh.  When manual pressure was lifted, there were 2 small veins proximally which were bleeding despite the tourniquet.  These were isolated with a small right angle clamp and ligated with 3-0 Vicryl.  The wound was then hemostatic with the tourniquet in place.  Next, we slowly released the tourniquet and found for additional points of bleeding around the wound edges which all appeared to be small veins.  These were once again isolated using a small right angle and ligated with 3-0 Vicryl suture.  At this point the wound was hemostatic even without the tourniquet.  It was thoroughly irrigated again.  Dr. Lopez of orthopedic surgery arrived and took over repair of any further injury including tendon(s) and the laceration.  Please see his operative dictation for further details.     Katherine Bee MD

## 2025-06-20 LAB
ATRIAL RATE - MUSE: 84 BPM
DIASTOLIC BLOOD PRESSURE - MUSE: NORMAL MMHG
INTERPRETATION ECG - MUSE: NORMAL
P AXIS - MUSE: 60 DEGREES
PR INTERVAL - MUSE: 178 MS
QRS DURATION - MUSE: 88 MS
QT - MUSE: 386 MS
QTC - MUSE: 456 MS
R AXIS - MUSE: 12 DEGREES
SYSTOLIC BLOOD PRESSURE - MUSE: NORMAL MMHG
T AXIS - MUSE: 30 DEGREES
VENTRICULAR RATE- MUSE: 84 BPM

## (undated) DEVICE — LINEN TOWEL PACK X5 5464

## (undated) DEVICE — TOURNIQUET SGL BLADDER 18"X4" RED 5921-218-135

## (undated) DEVICE — BAG CLEAR TRASH 1.3M 39X33" P4040C

## (undated) DEVICE — ENDO SNARE EXACTO COLD 9MM LOOP 2.4MMX230CM 00711115

## (undated) DEVICE — KIT ENDO TURNOVER/PROCEDURE W/CLEAN A SCOPE LINERS 103888

## (undated) DEVICE — LINEN FULL SHEET 5511

## (undated) DEVICE — SOLUTION IRRIGATION 0.9% NACL 1000ML BOTTLE R5200-01

## (undated) DEVICE — CAST PADDING 4" STERILE 9044S

## (undated) DEVICE — LINEN HALF SHEET 5512

## (undated) DEVICE — ENDO TRAP POLYP QUICK CATCH 710201

## (undated) DEVICE — BLADE KNIFE BEAVER MINI BEAVER6400

## (undated) DEVICE — UNDERPAD 36X30 PREMIERPRO MAX ABS NS LF 676111

## (undated) DEVICE — PREP CHLORAPREP 26ML TINTED HI-LITE ORANGE 930815

## (undated) DEVICE — GLOVE PROTEXIS POWDER FREE 8.0 ORTHO LIME 2D73ET80

## (undated) DEVICE — VESSEL LOOPS RED MINI 31145710

## (undated) DEVICE — PACK HAND SOP32HARMO

## (undated) DEVICE — VIAL DECANTER STERILE WHITE DYNJDEC06

## (undated) DEVICE — CAST PLASTER SPLINT 4X15" EXTRA FAST

## (undated) DEVICE — CAST BUCKET

## (undated) RX ORDER — FENTANYL CITRATE 50 UG/ML
INJECTION, SOLUTION INTRAMUSCULAR; INTRAVENOUS
Status: DISPENSED
Start: 2019-10-02